# Patient Record
Sex: FEMALE | Race: WHITE | NOT HISPANIC OR LATINO | Employment: UNEMPLOYED | URBAN - METROPOLITAN AREA
[De-identification: names, ages, dates, MRNs, and addresses within clinical notes are randomized per-mention and may not be internally consistent; named-entity substitution may affect disease eponyms.]

---

## 2017-02-07 ENCOUNTER — MEDICAL CORRESPONDENCE (OUTPATIENT)
Dept: HEALTH INFORMATION MANAGEMENT | Facility: CLINIC | Age: 22
End: 2017-02-07

## 2017-09-22 ENCOUNTER — MEDICAL CORRESPONDENCE (OUTPATIENT)
Dept: HEALTH INFORMATION MANAGEMENT | Facility: CLINIC | Age: 22
End: 2017-09-22

## 2017-09-25 ENCOUNTER — TELEPHONE (OUTPATIENT)
Dept: CARDIOLOGY | Facility: CLINIC | Age: 22
End: 2017-09-25

## 2017-09-25 NOTE — TELEPHONE ENCOUNTER
Ngozi from [email or web address] calling for Han Hester.  She needs to clarify her script for Revatio so she can send to patient.  Please call to discuss.  [phone].

## 2017-12-19 ENCOUNTER — PRE VISIT (OUTPATIENT)
Dept: CARDIOLOGY | Facility: CLINIC | Age: 22
End: 2017-12-19

## 2017-12-19 ENCOUNTER — OFFICE VISIT (OUTPATIENT)
Dept: CARDIOLOGY | Facility: CLINIC | Age: 22
End: 2017-12-19
Attending: INTERNAL MEDICINE
Payer: COMMERCIAL

## 2017-12-19 ENCOUNTER — MEDICAL CORRESPONDENCE (OUTPATIENT)
Dept: HEALTH INFORMATION MANAGEMENT | Facility: CLINIC | Age: 22
End: 2017-12-19

## 2017-12-19 VITALS
HEART RATE: 84 BPM | BODY MASS INDEX: 18.07 KG/M2 | OXYGEN SATURATION: 97 % | SYSTOLIC BLOOD PRESSURE: 96 MMHG | DIASTOLIC BLOOD PRESSURE: 60 MMHG | HEIGHT: 67 IN | WEIGHT: 115.1 LBS

## 2017-12-19 DIAGNOSIS — M54.9 BACK PAIN, UNSPECIFIED BACK LOCATION, UNSPECIFIED BACK PAIN LATERALITY, UNSPECIFIED CHRONICITY: Primary | ICD-10-CM

## 2017-12-19 PROCEDURE — 99213 OFFICE O/P EST LOW 20 MIN: CPT | Mod: ZF

## 2017-12-19 PROCEDURE — 99215 OFFICE O/P EST HI 40 MIN: CPT | Mod: ZP | Performed by: INTERNAL MEDICINE

## 2017-12-19 RX ORDER — CYCLOBENZAPRINE HCL 10 MG
10 TABLET ORAL 3 TIMES DAILY PRN
Qty: 42 TABLET | Refills: 3 | Status: SHIPPED | OUTPATIENT
Start: 2017-12-19 | End: 2020-11-05

## 2017-12-19 ASSESSMENT — PAIN SCALES - GENERAL: PAINLEVEL: NO PAIN (0)

## 2017-12-19 NOTE — MR AVS SNAPSHOT
After Visit Summary   12/19/2017    Anjelica Holloway    MRN: [id number]           Patient Information     Date Of Birth          1995        Visit Information        Provider Department      12/19/2017 6:00 PM Lizz Gutierrez MD [facility]        Today's Diagnoses     Back pain, unspecified back location, unspecified back pain laterality, unspecified chronicity    -  1      Care Instructions    You were seen today in the Cardiovascular Clinic at the [facility].      Cardiology Providers you saw during your visit:  Dr. Gutierrez    Diagnosis:  Digital ischemia    Results:  None today    Recommendations:  Continue medications as prescribed.     Obtain names for OB/Gyn    Follow-up:  6 months with Dr. Gutierrez.      For emergencies call 911.    For any scheduling needs, please call [phone]. Option 1 then option 3    Thank you for your visit today!     Please call if you have any questions or concerns.  Han Hester RN              Follow-ups after your visit        Your next 10 appointments already scheduled     Jun 19, 2018 12:30 PM CDT   (Arrive by 12:15 PM)   Return Vascular Visit with Lizz Gutierrez MD   [facility] ([facility])    [address] [place], [place] [id number] [phone]              Who to contact     If you have questions or need follow up information about today's clinic visit or your schedule please contact [facility] directly at [phone].  Normal or non-critical lab and imaging results will be communicated to you by MyChart, letter or phone within 4 business days after the clinic has received the results. If you do not hear from us within 7 days, please contact the clinic through MyChart or phone. If you have a critical or abnormal lab result, we will notify you by phone as soon as possible.  Submit refill requests through [employer] or call your pharmacy and they will forward the refill  "request to us. Please allow 3 business days for your refill to be completed.          Additional Information About Your Visit        [employer] Information     [employer] lets you send messages to your doctor, view your test results, renew your prescriptions, schedule appointments and more. To sign up, go to [email or web address] . Click on \"Log in\" on the left side of the screen, which will take you to the Welcome page. Then click on \"Sign up Now\" on the right side of the page.     You will be asked to enter the access code listed below, as well as some personal information. Please follow the directions to create your username and password.     Your access code is: [email or web address]  Expires: 3/5/2018  6:30 AM     Your access code will  in 90 days. If you need help or a new code, please call your [place] clinic or [phone].        Care EveryWhere ID     This is your Care EveryWhere ID. This could be used by other organizations to access your [place] medical records  [id number]        Your Vitals Were     Pulse Height Pulse Oximetry BMI (Body Mass Index)          84 1.702 m (5' 7\") 97% 18.03 kg/m2         Blood Pressure from Last 3 Encounters:   17 96/60   16 108/74   05/23/15 122/59    Weight from Last 3 Encounters:   17 52.2 kg (115 lb 1.6 oz)   16 51.3 kg (113 lb)   05/20/15 64.9 kg (143 lb)              Today, you had the following     No orders found for display         Today's Medication Changes          These changes are accurate as of: 17  6:54 PM.  If you have any questions, ask your nurse or doctor.               Start taking these medicines.        Dose/Directions    cyclobenzaprine 10 MG tablet   Commonly known as:  FLEXERIL   Used for:  Back pain, unspecified back location, unspecified back pain laterality, unspecified chronicity   Started by:  Lizz Gutierrez MD        Dose:  10 mg   Take 1 tablet (10 mg) by mouth 3 times daily as needed for muscle spasms "   Quantity:  42 tablet   Refills:  3            Where to get your medicines      These medications were sent to [id number] - [place], [place] - [address]  [address], [facility] [address]     Phone:  [phone]     cyclobenzaprine 10 MG tablet                Primary Care Provider Office Phone # Fax #    Zackery Bowen -[phone]       [facility] [address] [facility] [address]        Equal Access to Services     JULIA ROGERS : Hadii aad ku hadasho Soomaali, waaxda luqadaha, qaybta kaalmada adeegyada, waxay idiin hayaan adejustin andres . So [facility] [phone].    ATENCIÓN: Si habla español, tiene a echavarria disposición servicios gratuitos de asistencia lingüística. [facility] [phone].    We comply with applicable federal civil rights laws and [place] laws. We do not discriminate on the basis of race, color, national origin, age, disability, sex, sexual orientation, or gender identity.            Thank you!     Thank you for choosing [facility]  for your care. Our goal is always to provide you with excellent care. Hearing back from our patients is one way we can continue to improve our services. Please take a few minutes to complete the written survey that you may receive in the mail after your visit with us. Thank you!             Your Updated Medication List - Protect others around you: Learn how to safely use, store and throw away your medicines at www.disposemymeds.org.          This list is accurate as of: 12/19/17  6:54 PM.  Always use your most recent med list.                   Brand Name Dispense Instructions for use Diagnosis    acetaminophen 500 MG Caps      Take 1 capsule by mouth as needed.        calcium-vitamin D-vitamin K 500-500-40 MG-UNT-MCG Chew    VIACTIV     Take 1 tablet by mouth daily.        cyclobenzaprine 10 MG tablet    FLEXERIL    42 tablet    Take 1 tablet (10 mg) by mouth 3 times daily as needed for muscle spasms     Back pain, unspecified back location, unspecified back pain laterality, unspecified chronicity       gabapentin 300 MG capsule    NEURONTIN     Take 600 mg by mouth 3 times daily 1 1/2 po in P.M        oxyCODONE-acetaminophen 5-325 MG per tablet    PERCOCET    40 tablet    Take 1-2 tablets by mouth every 4 hours as needed for moderate to severe pain    S/P  section       PAXIL 10 MG tablet   Generic drug:  PARoxetine      Take 10 mg by mouth every morning        RITALIN LA 10 MG Cp24   Generic drug:  methylphenidate      Take 30 mg by mouth 2 tabs of 10mg in A.M and 1  10mg in P.M        sertraline 50 MG tablet    ZOLOFT     Take 50 mg by mouth        sildenafil 20 MG tablet    REVATIO    90 tablet    Take 1 tablet by mouth 3 times daily.    Vascular abnormality, PAD (peripheral artery disease) (H)       VITAMIN D3 PO      Take 1,000 Units by mouth daily.

## 2017-12-19 NOTE — TELEPHONE ENCOUNTER
Did she have imaging done at [place]?    Dr. Billingsley : 12/7/16  Anjelica is doing superbly, with pink feet, ongoing use of sildenafil, and mild to moderate foot pain, but this has not impaired her daily activities.  I see no reason why she cannot proceed with her current employment, enroll in school, and participate in any activities that she deems helpful to maintain her health and sense of well-being.       In order to diminish her foot pain, we certainly could consider increasing her sildenafil dose sequentially on an every other week basis to either doubling or tripling the maximal daily dose.  This is safe, but any adverse effects, including headache or nausea, can simply serve as an index that the highest tolerated dose has been achieved.  As well, I see no reason why Anjelica cannot continue the use of either a nonsteroidal or narcotic medication, as you deem best.  I am aware that nonsteroidal drugs are relatively contraindicated in the context of her gastric discomfort.       I have suggested that Anjelica consider undergoing MR imaging of the aorta and first order branches between now and next year, and she will undergo ankle-brachial index and toe perfusion measurements at her earliest convenience.       When she begins school and begins utilizing increasing doses of her Ritalin, oral sildenafil medication doses may again need adjustment.  In any case, I look forward to serving Anjelica's needs in the year ahead on an annual basis.  As always, Zackery, if any questions arise, please do not hesitate to call my office.

## 2017-12-19 NOTE — LETTER
12/19/2017      RE: Anjelica Holloway  [address] [facility] [address]       Dear Colleague,    Thank you for the opportunity to participate in the care of your patient, Anjelica Holloway, at the [facility]. Please see a copy of my visit note below.    HPI:     This is a pleasant 22-year-old female who was a patient of the late Dr. 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  In the past she has had scanning of her head chest abdomen pelvis and lower extremities to evaluate for further involvement of vascular EDS.    She reports that this fall she had an issue with medication refills and this went several months without any sildenafil.  She is on sildenafil 20 mg 3 times daily.  As a result her left foot got very swollen November significant redness and also some mild skin breakdown without development of ulceration of her fourth left toe.  Once she restarted her sildenafil her symptoms improved and the skin breakdown has been healing.  She is very careful with protecting her feet from the cold.    Patient reports to me today that she is 6-7 weeks pregnant and has yet to see an OB/GYN.  She is inquiring about high risk MFM either at the [facility] or [facility] but prefers [facility] only for ease of travel she lives in [place].  She is also asking about alternative pain medicine occasion therapies while she is pregnant.  She cannot take narcotics and wanted to decrease the gabapentin she is taking.  She reports Flexeril has worked for her in the past.    Review of systems is negative for chest pain palpi tations nausea vomiting  diaphoresis, abdominal pain, diarrhea, orthopnea, lower extremity swelling, PND.    She is here with her mother who also has EDS, likely type III.  Her daughter here is also present who is now 2 years old and has significant issues with constipation.  She would like to wait until her daughter is much older before screening for EDS.    PAST MEDICAL HISTORY  Past Medical History:   Diagnosis Date     ADHD (attention deficit hyperactivity disorder)      Ehler's-Danlos syndrome 2/1/2013     Problem list name updated by automated process. Provider to review and confirm     Headaches      Joint hyperextensibility of multiple sites     suspect Ehler-Danlos but genetics testing not conclusive      Meckel's diverticulum     rupture of Meckel's diverticulum s/p bowel resection, approx 4 inches removed     PAD (peripheral artery disease) (H) 1/25/2012    Non-atherosclerotic PAD due to unknown arterial occlusive disease, presumed Bill-Danlos syndrome, with superimposed vasospasm      Peptic ulcer disease     secondary to prolonged NSAIDs use for headache     POTS (postural orthostatic tachycardia syndrome)      Vascular abnormality 2/1/2013     Vascular anomalies, 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, and hypoplastic right posterior tibular arteries       CURRENT MEDICATIONS  Current Outpatient Prescriptions   Medication Sig Dispense Refill     sertraline (ZOLOFT) 50 MG tablet Take 50 mg by mouth       sildenafil (REVATIO/VIAGRA) 20 MG tablet Take 1 tablet by mouth 3 times daily. 90 tablet 3     calcium-vitamin D-vitamin K (VIACTIV) 500-500-40 MG-UNT-MCG CHEW Take 1 tablet by mouth daily.       Cholecalciferol (VITAMIN D3 PO) Take 1,000 Units by mouth daily.       acetaminophen 500 MG CAPS Take 1 capsule by mouth as needed.       methylphenidate (RITALIN LA) 10 MG CP24 Take 30 mg by mouth 2 tabs of 10mg in A.M and 1  10mg in P.M       gabapentin (NEURONTIN) 300 MG  capsule Take 600 mg by mouth 3 times daily 1 1/2 po in P.M       PARoxetine (PAXIL) 10 MG tablet Take 10 mg by mouth every morning       oxyCODONE-acetaminophen (PERCOCET) 5-325 MG per tablet Take 1-2 tablets by mouth every 4 hours as needed for moderate to severe pain (Patient not taking: Reported on 2017) 40 tablet 0       PAST SURGICAL HISTORY:  Past Surgical History:   Procedure Laterality Date     ------------OTHER-------------  rupture Meckel diverticulum with removal of necroic bowel    Removal of      APPENDECTOMY        SECTION N/A 2015    Procedure:  SECTION;  Surgeon: Zulma Chavez MD;  Location: [facility] L[facility]     CHOLECYSTECTOMY         ALLERGIES     Allergies   Allergen Reactions     Nsaids GI Disturbance       FAMILY HISTORY  Mother with EDS   2 year old daughter - significant constipation         SOCIAL HISTORY  Social History     Social History     Marital status: Single     Spouse name: N/A     Number of children: N/A     Years of education: N/A     Occupational History     Not on file.     Social History Main Topics     Smoking status: Current Some Day Smoker     Packs/day: 0.50     Types: Cigarettes     Smokeless tobacco: Never Used     Alcohol use Yes      Comment: once/month     Drug use: No     Sexual activity: Not on file     Other Topics Concern     Not on file     Social History Narrative       ROS:   Constitutional: No fever, chills, or sweats. No weight gain/loss   ENT: No visual disturbance, ear ache, epistaxis, sore throat  Allergies/Immunologic: Negative  Respiratory: No cough, hemoptysia  Cardiovascular: As per HPI  GI: No nausea, vomiting, hematemesis, melena, or hematochezia  : No urinary frequency, dysuria, or hematuria  Integument: Negative  Psychiatric: Negative  Neuro: Negative  Endocrinology: Negative   Musculoskeletal: Negative  Vascular: No walking impairment, claudication, ischemic rest pain or nonhealing wounds    EXAM:  BP 96/60 (BP Location:  "Left arm, Patient Position: Chair, Cuff Size: Adult Regular)  Pulse 84  Ht 1.702 m (5' 7\")  Wt 52.2 kg (115 lb 1.6 oz)  SpO2 97%  BMI 18.03 kg/m2  In general, the patient is a pleasant female in no apparent distress.    HEENT: NC/AT.  PERRLA.  EOMI.  Sclerae white, not injected.  Nares clear.  Pharynx without erythema or exudate.  Dentition intact.    Neck: No adenopathy.  No thyromegaly. Carotids +2/2 bilaterally without bruits.  No jugular venous distension.   Heart: RRR. Normal S1, S2 splits physiologically. No murmur, rub, click, or gallop. The PMI is in the 5th ICS in the midclavicular line. There is no heave.    Lungs: CTA.  No ronchi, wheezes, rales.  No dullness to percussion.   Abdomen: Soft, nontender, nondistended. No organomegaly. No AAA.  No bruits.   Vascular: Absent DP pulse and PT pulse in the left diminished DP pulse in the right.  Femoral 2+ bilaterally.  Radials 2+ bilaterally.    Labs:  LIPID RESULTS:  No results found for: CHOL, HDL, LDL, TRIG, CHOLHDLRATIO, NHDL    LIVER ENZYME RESULTS:  Lab Results   Component Value Date    AST 17 02/02/2013    ALT 13 02/02/2013       CBC RESULTS:  Lab Results   Component Value Date    WBC 12.2 (H) 05/20/2015    RBC 3.73 (L) 05/20/2015    HGB 11.0 (L) 05/21/2015    HCT 35.6 05/20/2015    MCV 95 05/20/2015    MCH 31.4 05/20/2015    MCHC 32.9 05/20/2015    RDW 12.9 05/20/2015     05/20/2015       BMP RESULTS:  Lab Results   Component Value Date     02/02/2013    POTASSIUM 4.0 02/02/2013    CHLORIDE 107 02/02/2013    CO2 23 02/02/2013    ANIONGAP 8 02/02/2013    GLC 85 02/02/2013    BUN 17 02/02/2013    CR 0.64 02/02/2013    GFRESTIMATED >90 02/02/2013    GFRESTBLACK >90 02/02/2013    ERICA 8.7 02/02/2013        A1C RESULTS:  No results found for: A1C    Procedures:    4/1/2011   COMPARISON:    No similar studies.     TECHNIQUE:    Two-dimensional  non-contrast time of flight angiography  of the neck and head was performed. Following this, " contrast-enhanced  three-dimensional angiography was performed of the neck and head.     FINDINGS:    The common and internal carotid arteries are patent  bilaterally, without significant stenosis. Both vertebral arteries are  patent as well, with the V3 segments bilaterally not well seen, likely  due to artifact. Intracranially, the anterior cerebral, middle  cerebral, and posterior cerebral arteries bilaterally are patent.     IMPRESSION:    Normal examination. No stenosis, dissection, or  abnormal dilatation.    MRA of the pelvis and peripheral vessels dated 1/21/2011.     Clinical information: None available.     Technique: Arterial 2D TOF images were obtained through the pelvis and  lower extremities. Mask images were obtained at the pelvis, thighs,  knees, and legs. Post gadolinium rapid acquisitions were obtained at  each level. A second injection and multiple rapid FLASH acquisitions,  subtracted and non-subtracted were obtained separately in the lower  abdomen and pelvis. Source images were reviewed as well as 3D and  multi-planar reconstructions.      Findings:     Aorta: The heart is normal caliber without dissection bearing. Celiac,  superior mesenteric artery, and inferior mesenteric artery are widely  patent. Single renal arteries present bilaterally.     Right lower extremity:     The right common and external iliac artery are widely patent as are  the common femoral, deep femoral, femoral, and popliteal arteries. The  right anterior tibial artery is patent at least the level of the ankle  joint but is hypoplastic throughout its course. The tibioperoneal  trunk is widely patent and normal caliber. The posterior tibial artery  terminates approximately 9 cm from the bifurcation with creation of  multiple collaterals. Posterior tibial occlusion occurs 21 cm above  the ankle joint line. The peroneal trunk is prominent with patent flow  into the foot.     Left lower extremity:     The left common and  external iliac are widely patent as on the common  femoral, deep femoral, femoral common popliteal arteries. The left  anterior tibial artery is patent at least to the level of the ankle  joint but is hypoplastic throughout its course. The tibioperoneal  trunk is widely patent and normal caliber. The posterior tibial artery  terminates approximately 2.5 cm distal to the tibioperoneal  bifurcation and 25 cm proximal to the ankle mortise. The posterior  tibial artery is prominent and widely patent with flow visualized into  the foot.     Abdomen and pelvis:       A 4.3 x 4 cm hypointense nonenhancing lesion is present in the left  adnexa either representing a cyst or endometrioma. Remainder of the  abdominal and pelvic organs are unremarkable.     Impression:     1. Occlusion of both posterior tibial arteries occurring in the  midcalf on the right and the upper calf on the left.  2. Hypoplastic bilateral anterior tibial arteries with patent flow  into the feet.  3. Prominent peroneal arteries bilaterally.  4. Nonenhancing hypointense lesion in the left adnexa. While this  adnexal lesion likely represents a benign entity such as functional  cyst, it is incompletely characterized. Ultrasound could be performed.       1/21/2011  FINDINGS:     Upper extremity:   Right upper extremity: LON-0.96     Left upper extremity: LON-0.96     Essentially normal morphology waveforms in the upper extremities.     Lower extremity:  Right lower extremity:  Ankle/brachial index 1.04. Digit/brachial  index 0.59  Significant segmental pressure drop in the first digit as compared to  the ankle.     Left lower extremity: Ankle/brachial index 1.00. Digit/brachial index  0.61  Significant segmental pressure drop in the first digit as compared to  the ankle.     Asymmetric blunted waveforms in the right dorsalis pedis artery.     IMPRESSION:  1. Normal resting ankle/brachial indices.  2. Normal upper extremity wrist/brachial indices.  3.  Abnormal bilateral decreased first digit/brachial indices. May  represent delayed reconstituted flow versus peripheral small artery  disease given MRA same date demonstrated occluded posterior tibial  arteries and narrowed anterior tibial arteries with blunted dorsalis  pedis waveforms.    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  At present she has no open ulcerations.    We will continue sildenafil 20 mg 3 times daily which seems to offer her symptomatic relief and improve blood flow to her foot.  She has no vascular involvement that is commonly seen with EDS, such as aortic disease or large vessel aneurysms/dissections. However she has congenital vascular anomalies that may or may not be syndromic versus a separate disease process.    Aggressive preventive measures will continue, such as warm socks at all times and avoiding prolonged cold exposures.   She is pregnant and the increased blood flow may actually improve her symptoms and small vessel disease.   Will refer her to high risk MFM here at the  (Dr. Jatinder Stroud recommended by Denisa Parsons) and also find her a name for [facility] so she has someone near where she lives, although she likes receiving care here.       Total time spent 60 minutes, of which >50% was spent in face-to-face patient evaluation, reviewing data with patient, and coordination of care.     Lizz Gutierrez MD MSc    Division of Cardiology and Vascular Medicine   [facility]

## 2017-12-19 NOTE — NURSING NOTE
Chief Complaint   Patient presents with     Follow Up For     23 y/o female for f/u of Ehler's Danlos w/ digital ischemia.     Vitals were taken and medications were reconciled.    Marilyn Harper MA    5:58 PM

## 2017-12-19 NOTE — PROGRESS NOTES
HPI:     This is a pleasant 22-year-old female who was a patient of the late Dr. 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  In the past she has had scanning of her head chest abdomen pelvis and lower extremities to evaluate for further involvement of vascular EDS.    She reports that this fall she had an issue with medication refills and this went several months without any sildenafil.  She is on sildenafil 20 mg 3 times daily.  As a result her left foot got very swollen November significant redness and also some mild skin breakdown without development of ulceration of her fourth left toe.  Once she restarted her sildenafil her symptoms improved and the skin breakdown has been healing.  She is very careful with protecting her feet from the cold.    Patient reports to me today that she is 6-7 weeks pregnant and has yet to see an OB/GYN.  She is inquiring about high risk MFM either at the [facility] or [facility] but prefers [facility] only for ease of travel she lives in [place].  She is also asking about alternative pain medicine occasion therapies while she is pregnant.  She cannot take narcotics and wanted to decrease the gabapentin she is taking.  She reports Flexeril has worked for her in the past.    Review of systems is negative for chest pain palpi tations nausea vomiting diaphoresis, abdominal pain, diarrhea, orthopnea, lower extremity swelling, PND.    She is here with her mother who also has EDS, likely type III.  Her daughter here is also present who is now 2 years old and has significant issues with constipation.  She would like to wait until her daughter is much older before  screening for EDS.    PAST MEDICAL HISTORY  Past Medical History:   Diagnosis Date     ADHD (attention deficit hyperactivity disorder)      Ehler's-Danlos syndrome 2/1/2013     Problem list name updated by automated process. Provider to review and confirm     Headaches      Joint hyperextensibility of multiple sites     suspect Ehler-Danlos but genetics testing not conclusive      Meckel's diverticulum     rupture of Meckel's diverticulum s/p bowel resection, approx 4 inches removed     PAD (peripheral artery disease) (H) 1/25/2012    Non-atherosclerotic PAD due to unknown arterial occlusive disease, presumed Bill-Danlos syndrome, with superimposed vasospasm      Peptic ulcer disease     secondary to prolonged NSAIDs use for headache     POTS (postural orthostatic tachycardia syndrome)      Vascular abnormality 2/1/2013     Vascular anomalies, 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, and hypoplastic right posterior tibular arteries       CURRENT MEDICATIONS  Current Outpatient Prescriptions   Medication Sig Dispense Refill     sertraline (ZOLOFT) 50 MG tablet Take 50 mg by mouth       sildenafil (REVATIO/VIAGRA) 20 MG tablet Take 1 tablet by mouth 3 times daily. 90 tablet 3     calcium-vitamin D-vitamin K (VIACTIV) 500-500-40 MG-UNT-MCG CHEW Take 1 tablet by mouth daily.       Cholecalciferol (VITAMIN D3 PO) Take 1,000 Units by mouth daily.       acetaminophen 500 MG CAPS Take 1 capsule by mouth as needed.       methylphenidate (RITALIN LA) 10 MG CP24 Take 30 mg by mouth 2 tabs of 10mg in A.M and 1  10mg in P.M       gabapentin (NEURONTIN) 300 MG capsule Take 600 mg by mouth 3 times daily 1 1/2 po in P.M       PARoxetine (PAXIL) 10 MG tablet Take 10 mg by mouth every morning       oxyCODONE-acetaminophen (PERCOCET) 5-325 MG per tablet Take 1-2 tablets by mouth every 4 hours as needed for moderate to severe pain (Patient not taking: Reported on 12/19/2017) 40  "tablet 0       PAST SURGICAL HISTORY:  Past Surgical History:   Procedure Laterality Date     ------------OTHER-------------  rupture Meckel diverticulum with removal of necroic bowel    Removal of      APPENDECTOMY        SECTION N/A 2015    Procedure:  SECTION;  Surgeon: Zulma Chavez MD;  Location: [facility]     CHOLECYSTECTOMY         ALLERGIES     Allergies   Allergen Reactions     Nsaids GI Disturbance       FAMILY HISTORY  Mother with EDS   2 year old daughter - significant constipation         SOCIAL HISTORY  Social History     Social History     Marital status: Single     Spouse name: N/A     Number of children: N/A     Years of education: N/A     Occupational History     Not on file.     Social History Main Topics     Smoking status: Current Some Day Smoker     Packs/day: 0.50     Types: Cigarettes     Smokeless tobacco: Never Used     Alcohol use Yes      Comment: once/month     Drug use: No     Sexual activity: Not on file     Other Topics Concern     Not on file     Social History Narrative       ROS:   Constitutional: No fever, chills, or sweats. No weight gain/loss   ENT: No visual disturbance, ear ache, epistaxis, sore throat  Allergies/Immunologic: Negative  Respiratory: No cough, hemoptysia  Cardiovascular: As per HPI  GI: No nausea, vomiting, hematemesis, melena, or hematochezia  : No urinary frequency, dysuria, or hematuria  Integument: Negative  Psychiatric: Negative  Neuro: Negative  Endocrinology: Negative   Musculoskeletal: Negative  Vascular: No walking impairment, claudication, ischemic rest pain or nonhealing wounds    EXAM:  BP 96/60 (BP Location: Left arm, Patient Position: Chair, Cuff Size: Adult Regular)  Pulse 84  Ht 1.702 m (5' 7\")  Wt 52.2 kg (115 lb 1.6 oz)  SpO2 97%  BMI 18.03 kg/m2  In general, the patient is a pleasant female in no apparent distress.    HEENT: NC/AT.  PERRLA.  EOMI.  Sclerae white, not injected.  Nares clear.  Pharynx without " erythema or exudate.  Dentition intact.    Neck: No adenopathy.  No thyromegaly. Carotids +2/2 bilaterally without bruits.  No jugular venous distension.   Heart: RRR. Normal S1, S2 splits physiologically. No murmur, rub, click, or gallop. The PMI is in the 5th ICS in the midclavicular line. There is no heave.    Lungs: CTA.  No ronchi, wheezes, rales.  No dullness to percussion.   Abdomen: Soft, nontender, nondistended. No organomegaly. No AAA.  No bruits.   Vascular: Absent DP pulse and PT pulse in the left diminished DP pulse in the right.  Femoral 2+ bilaterally.  Radials 2+ bilaterally.    Labs:  LIPID RESULTS:  No results found for: CHOL, HDL, LDL, TRIG, CHOLHDLRATIO, NHDL    LIVER ENZYME RESULTS:  Lab Results   Component Value Date    AST 17 02/02/2013    ALT 13 02/02/2013       CBC RESULTS:  Lab Results   Component Value Date    WBC 12.2 (H) 05/20/2015    RBC 3.73 (L) 05/20/2015    HGB 11.0 (L) 05/21/2015    HCT 35.6 05/20/2015    MCV 95 05/20/2015    MCH 31.4 05/20/2015    MCHC 32.9 05/20/2015    RDW 12.9 05/20/2015     05/20/2015       BMP RESULTS:  Lab Results   Component Value Date     02/02/2013    POTASSIUM 4.0 02/02/2013    CHLORIDE 107 02/02/2013    CO2 23 02/02/2013    ANIONGAP 8 02/02/2013    GLC 85 02/02/2013    BUN 17 02/02/2013    CR 0.64 02/02/2013    GFRESTIMATED >90 02/02/2013    GFRESTBLACK >90 02/02/2013    ERICA 8.7 02/02/2013        A1C RESULTS:  No results found for: A1C    Procedures:    4/1/2011   COMPARISON:    No similar studies.     TECHNIQUE:    Two-dimensional  non-contrast time of flight angiography  of the neck and head was performed. Following this, contrast-enhanced  three-dimensional angiography was performed of the neck and head.     FINDINGS:    The common and internal carotid arteries are patent  bilaterally, without significant stenosis. Both vertebral arteries are  patent as well, with the V3 segments bilaterally not well seen, likely  due to artifact.  Intracranially, the anterior cerebral, middle  cerebral, and posterior cerebral arteries bilaterally are patent.     IMPRESSION:    Normal examination. No stenosis, dissection, or  abnormal dilatation.    MRA of the pelvis and peripheral vessels dated 1/21/2011.     Clinical information: None available.     Technique: Arterial 2D TOF images were obtained through the pelvis and  lower extremities. Mask images were obtained at the pelvis, thighs,  knees, and legs. Post gadolinium rapid acquisitions were obtained at  each level. A second injection and multiple rapid FLASH acquisitions,  subtracted and non-subtracted were obtained separately in the lower  abdomen and pelvis. Source images were reviewed as well as 3D and  multi-planar reconstructions.      Findings:     Aorta: The heart is normal caliber without dissection bearing. Celiac,  superior mesenteric artery, and inferior mesenteric artery are widely  patent. Single renal arteries present bilaterally.     Right lower extremity:     The right common and external iliac artery are widely patent as are  the common femoral, deep femoral, femoral, and popliteal arteries. The  right anterior tibial artery is patent at least the level of the ankle  joint but is hypoplastic throughout its course. The tibioperoneal  trunk is widely patent and normal caliber. The posterior tibial artery  terminates approximately 9 cm from the bifurcation with creation of  multiple collaterals. Posterior tibial occlusion occurs 21 cm above  the ankle joint line. The peroneal trunk is prominent with patent flow  into the foot.     Left lower extremity:     The left common and external iliac are widely patent as on the common  femoral, deep femoral, femoral common popliteal arteries. The left  anterior tibial artery is patent at least to the level of the ankle  joint but is hypoplastic throughout its course. The tibioperoneal  trunk is widely patent and normal caliber. The posterior tibial  artery  terminates approximately 2.5 cm distal to the tibioperoneal  bifurcation and 25 cm proximal to the ankle mortise. The posterior  tibial artery is prominent and widely patent with flow visualized into  the foot.     Abdomen and pelvis:       A 4.3 x 4 cm hypointense nonenhancing lesion is present in the left  adnexa either representing a cyst or endometrioma. Remainder of the  abdominal and pelvic organs are unremarkable.     Impression:     1. Occlusion of both posterior tibial arteries occurring in the  midcalf on the right and the upper calf on the left.  2. Hypoplastic bilateral anterior tibial arteries with patent flow  into the feet.  3. Prominent peroneal arteries bilaterally.  4. Nonenhancing hypointense lesion in the left adnexa. While this  adnexal lesion likely represents a benign entity such as functional  cyst, it is incompletely characterized. Ultrasound could be performed.       1/21/2011  FINDINGS:     Upper extremity:   Right upper extremity: LON-0.96     Left upper extremity: LON-0.96     Essentially normal morphology waveforms in the upper extremities.     Lower extremity:  Right lower extremity:  Ankle/brachial index 1.04. Digit/brachial  index 0.59  Significant segmental pressure drop in the first digit as compared to  the ankle.     Left lower extremity: Ankle/brachial index 1.00. Digit/brachial index  0.61  Significant segmental pressure drop in the first digit as compared to  the ankle.     Asymmetric blunted waveforms in the right dorsalis pedis artery.     IMPRESSION:  1. Normal resting ankle/brachial indices.  2. Normal upper extremity wrist/brachial indices.  3. Abnormal bilateral decreased first digit/brachial indices. May  represent delayed reconstituted flow versus peripheral small artery  disease given MRA same date demonstrated occluded posterior tibial  arteries and narrowed anterior tibial arteries with blunted dorsalis  pedis waveforms.      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  At present she has no open ulcerations.    We will continue sildenafil 20 mg 3 times daily which seems to offer her symptomatic relief and improve blood flow to her foot.  She has no vascular involvement that is commonly seen with EDS, such as aortic disease or large vessel aneurysms/dissections. However she has congenital vascular anomalies that may or may not be syndromic versus a separate disease process.    Aggressive preventive measures will continue, such as warm socks at all times and avoiding prolonged cold exposures.   She is pregnant and the increased blood flow may actually improve her symptoms and small vessel disease.   Will refer her to high risk MFM here at the  (Dr. Jatinder Stroud recommended by Denisa Parsons) and also find her a name for [facility] so she has someone near where she lives, although she likes receiving care here.       Total time spent 60 minutes, of which >50% was spent in face-to-face patient evaluation, reviewing data with patient, and coordination of care.     Lizz Gutierrez MD MSc    Division of Cardiology and Vascular Medicine   [facility]

## 2017-12-20 NOTE — PATIENT INSTRUCTIONS
You were seen today in the Cardiovascular Clinic at the [facility].      Cardiology Providers you saw during your visit:  Dr. Gutierrez    Diagnosis:  Digital ischemia    Results:  None today    Recommendations:  Continue medications as prescribed.     Obtain names for OB/Gyn    Follow-up:  6 months with Dr. Gutierrez.      For emergencies call 911.    For any scheduling needs, please call [phone]. Option 1 then option 3    Thank you for your visit today!     Please call if you have any questions or concerns.  Han Hester RN

## 2017-12-20 NOTE — NURSING NOTE
Med Reconcile: Reviewed and verified all current medications with the patient. The updated medication list was printed and given to the patient.  Return Appointment: 6 months with Dr. Gutierrez.  Patient given instructions regarding scheduling next clinic visit. Patient demonstrated understanding of this information and agreed to call with further questions or concerns.  Patient stated she understood all health information given and agreed to call with further questions or concerns.

## 2018-01-09 ENCOUNTER — TELEPHONE (OUTPATIENT)
Dept: CARDIOLOGY | Facility: CLINIC | Age: 23
End: 2018-01-09

## 2018-01-09 NOTE — TELEPHONE ENCOUNTER
Patient returning a call from Dr. Gutierrez's office.  Patient reports Dr. Gutierrez was going to provide some names of maternal fetal physicians near [facility] where she lives.    Please call to discuss.  [phone].

## 2018-01-30 ENCOUNTER — CARE COORDINATION (OUTPATIENT)
Dept: CARDIOLOGY | Facility: CLINIC | Age: 23
End: 2018-01-30

## 2018-01-30 DIAGNOSIS — Q79.60 EDS (EHLERS-DANLOS SYNDROME): Primary | ICD-10-CM

## 2018-01-31 DIAGNOSIS — Q79.60 EHLERS-DANLOS SYNDROME: Primary | ICD-10-CM

## 2018-01-31 NOTE — PROGRESS NOTES
Denisa christensen recommended Jatinder Burton. Anjelica called and they need an internal referral from us. She should be under the care of high-risk OB. She no longer wants to go to [facility] and wants to stay with [facility] for her OB care.     Order placed

## 2018-02-07 ENCOUNTER — CARE COORDINATION (OUTPATIENT)
Dept: CARDIOLOGY | Facility: CLINIC | Age: 23
End: 2018-02-07

## 2018-02-07 ENCOUNTER — PRE VISIT (OUTPATIENT)
Dept: MATERNAL FETAL MEDICINE | Facility: CLINIC | Age: 23
End: 2018-02-07

## 2018-02-07 NOTE — PROGRESS NOTES
Left voicemail for Anjelica.  I wanted to f/u with here on her referral and appointment with Dr. Jatinder Burton.  It appears that Anjelica has made the decision to have her OB/Gyn care here with in the [facility] system.  Left call back number.  Will f/u with her if she has any further questions.

## 2018-02-08 ENCOUNTER — HOSPITAL ENCOUNTER (OUTPATIENT)
Dept: ULTRASOUND IMAGING | Facility: CLINIC | Age: 23
Discharge: HOME | End: 2018-02-08
Attending: OBSTETRICS & GYNECOLOGY | Admitting: OBSTETRICS & GYNECOLOGY
Payer: COMMERCIAL

## 2018-02-08 ENCOUNTER — OFFICE VISIT (OUTPATIENT)
Dept: MATERNAL FETAL MEDICINE | Facility: CLINIC | Age: 23
End: 2018-02-08
Attending: OBSTETRICS & GYNECOLOGY
Payer: COMMERCIAL

## 2018-02-08 VITALS
BODY MASS INDEX: 19.08 KG/M2 | SYSTOLIC BLOOD PRESSURE: 110 MMHG | DIASTOLIC BLOOD PRESSURE: 54 MMHG | WEIGHT: 121.8 LBS | OXYGEN SATURATION: 97 % | TEMPERATURE: 100.2 F

## 2018-02-08 DIAGNOSIS — I99.9 VASCULAR ABNORMALITY: ICD-10-CM

## 2018-02-08 DIAGNOSIS — Q79.60 EHLERS-DANLOS SYNDROME: ICD-10-CM

## 2018-02-08 DIAGNOSIS — Q79.60 EHLERS-DANLOS SYNDROME: Primary | ICD-10-CM

## 2018-02-08 DIAGNOSIS — J10.1 INFLUENZA A: ICD-10-CM

## 2018-02-08 LAB
FLUAV+FLUBV AG SPEC QL: NEGATIVE
FLUAV+FLUBV AG SPEC QL: POSITIVE
SPECIMEN SOURCE: ABNORMAL

## 2018-02-08 PROCEDURE — 87804 INFLUENZA ASSAY W/OPTIC: CPT | Mod: 91 | Performed by: OBSTETRICS & GYNECOLOGY

## 2018-02-08 PROCEDURE — 76805 OB US >/= 14 WKS SNGL FETUS: CPT

## 2018-02-08 PROCEDURE — G0463 HOSPITAL OUTPT CLINIC VISIT: HCPCS | Mod: 25,ZF

## 2018-02-08 RX ORDER — PRENATAL VIT/IRON FUM/FOLIC AC 27MG-0.8MG
1 TABLET ORAL DAILY
COMMUNITY
End: 2020-11-05

## 2018-02-08 RX ORDER — OSELTAMIVIR PHOSPHATE 75 MG/1
75 CAPSULE ORAL 2 TIMES DAILY
Qty: 10 CAPSULE | Refills: 0 | Status: ON HOLD | OUTPATIENT
Start: 2018-02-08 | End: 2018-07-24

## 2018-02-08 NOTE — PROGRESS NOTES
E&M15  Pt presents to [facility] for assessment and evaluation of her pregnancy due to maternal EDS. Pt states she is doing well. Pt states not feeling any fm yet. No contractions, lof or bleeding. Us done today and reviewed by Dr. Urbano. See epic for today's findings. Obv done. See flow sheet. Pt seen today by Dr. Sheriff and Dr. Urbano. Pt states her daughter was just treated for influenza and she has also had symptoms. Influenza swab done today. Pt aware she will be called with results. Will continue with comfort measures at this time. Will have follow up with Cardiology in June prior to baby being born for her EDS. Will have first ob labs at next visit due to being sick today. Will follow up in 4-5 weeks for L2 and obv. Knows when to come in or call with questions or concerns. Dc stable at this time. Teresa Gallardo RN

## 2018-02-08 NOTE — PROGRESS NOTES
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  At present, she has no open ulcerations.   Denies: VB, LOF, ctx    Reports: Flu-like symptoms, sneezing, coughing, temp 100.2 for the past 2-3 days, her daughter had +influenza testing     Objective:   Vitals:/54 (BP Location: Left arm, Patient Position: Sitting, Cuff Size: Adult Regular)  Temp 100.2  F (37.9  C) (Oral)  Wt 55.2 kg (121 lb 12.8 oz)  LMP 10/30/2017  SpO2 97%  BMI 19.08 kg/m2   Daptone: FH + on today's US       Past Medical History:  -History of ruptured Meckel s Diverticulum s /p ~4 inch bowel resection  -POTS (postural orthostatic tachycardia syndrome)  -ADHD (attention deficit hyperactivity disorder)     -Peptic ulcer disease secondary to prolonged NSAIDs use for HA.  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   LTC-section elective in     GYN history: Denies h/o STI, fibroid, ov cysts, or abn pap  Medications: PNV, Sildenafil 20 mg tid, Sertraline, Paroxetine, Methylphenidate, Gabapentin, Tylenol, Percocet  Allergy: NSAIDs (GI disturbances)  FH: Mother with EDS, 2 yr old daughter with constipation.  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  Per genetics note this gene is on the X-chromosome - patients with mutations in the FLNA gene can have periventricular heterotopias on MRI. Patients can also have CV findings, hyperflexibility, and dyslexia.  MRI in March was negative for periventricular heterotopias,which as per Dr. Inman, is needed as diagnostic criteria for this condition.  May be carriers of Otopalatodigital spectrum disorders (X-linked disorders), associated with hearing loss, developmental delay, and specific limb shortening such as proximally placed thumbs or hypoplastic digits.    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    Imaging:  -MRI brain 2015: Negative, no structural abnormalities are identified. No neuronal migration anomalies are seen.  -MRI chest/abdomen 2015: No evidence of aneurysm or other focal within the thoracic and abdominal aorta.  -MRA head 2015: No evident aneurysm or stenosis of the major intracranial arteries.  -US Ankle-arm index 2013:   1. Dampened waveform of the left lower extremity third digit  compatible with small vessel disease.  2. Left LON indicating mild peripheral arterial disease of the left  lower extremity. Normal right LON.    -LE MRA :  1. Occlusion of both posterior tibial arteries occurring in the  midcalf on the right and the upper calf on the left.  2. Hypoplastic bilateral anterior tibial arteries with patent flow  into the feet.  3. Prominent peroneal arteries bilaterally.  -ECHO 2015: Global and regional left ventricular function is normal with an EF of 60-65%.   Right ventricular function, chamber size, wall motion, and thickness are normal. Pulmonary artery systolic pressure cannot be assessed. The inferior vena cava is normal. The aorta root is normal. No pericardial effusion is present.        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  At present, she has no open ulcerations.     1)EDS classic vs type III with vascular malformations:  -She desires repeat CS  -Anesthesia consult @32 weeks  -PTL precautions  -Initially followed by  who passed away but then started being followed by Dr. Gutierrez.      2) Vascular spasms/peripheral vascular disease  -Followed by cardiology (Dr. Gutierrez), last seen on 17, next visit 2018  -on Sildenafil for symptomatic relief (Symptoms improved after initiation)  -She is pregnant and the increased blood flow may actually improve her symptoms and small vessel disease.  - Aggressive preventive measures will continue, such as warm socks at all times and avoiding prolonged cold exposures. She is pregnant and the increased blood flow may actually improve her symptoms and small vessel disease.     3)Prenatal care:  -Prenatal labs:   A. OB initial:neg pap 1-2 years ago per pt, will draw OB labs next visit due to her cold symptoms today.  B. GCT >24 weeks: TBD  C. 3rd trim labs :TBD  D.GBS: TBD    -Weight gain:BMI 18, counseled 25-30 lb weight gain/pregnancy goal.    -Vaccinations: Tdap: TBG, Influenza: not received    -Genetics  Didn't have 1st trim screen, offered Quad, she agreed  FLNA gene mutation:  genetic evaluation of baby for possible Otopalatodigital spectrum disorders after birth.      -Imaging:   Please review imaging tab for report    -Delivery plan:  Pt desires Repeat CS. To be done @39 weeks unless indicated earlier.    -Contraception  Not interested in tubal    Plan  RTC 4 weeks  Flu swab today, if + will send Tamiflu  OB labs next visit  Quad screen next visit  Anatomy US +cervical length next visit, if cx length normal then no need for further screening      I served as scribe for Dr. Sundeep Sheriff,  MD  Maternal-Fetal Medicine fellow  Date: February 8, 2018     Physician Attestation   I, Rosalio Urbano, saw and evaluated Anjeilca Holloway with Dr. Sheriff.      I have reviewed and discussed with Dr. Sheriff the patient history, physical exam and plan of care. I personally reviewed the vital signs, medications, lab results and imaging.    Key history or physical exam findings: Classic versus Type II EDS and lower extremity vascular malformation on sildenafil. Possible influenza. Cardiology evaluation in Dec 2017:  1. Occlusion of both posterior tibial arteries occurring in the  midcalf on the right and the upper calf on the left.  2. Hypoplastic bilateral anterior tibial arteries with patent flow  into the feet.  3. Prominent peroneal arteries bilaterally.  4. Nonenhancing hypointense lesion in the left adnexa. While this  adnexal lesion likely represents a benign entity such as functional  cyst, it is incompletely characterized. Ultrasound could be performed.    Key management decisions made: Continue prenatal surveillance, offer aneuploidy risk assessment and prenatal labs. Screen and treat for possible influenza.     Rosalio Urbano  Date of Service (when I saw the patient): 02/08/18    Time Spent on this Encounter   I, Rosalio Urbano, spent a total of 15 minutes in face to face consultation today managing the care of Anjelica Holloway.  Over 50% of my time on the unit was spent counseling the patient and /or coordinating care regarding prenatal care.. See note for details.

## 2018-02-08 NOTE — MR AVS SNAPSHOT
After Visit Summary   2/8/2018    Anjelica Holloway    MRN: [id number]           Patient Information     Date Of Birth          1995        Visit Information        Provider Department      2/8/2018 11:00 AM Rosalio Urbano MD [facility] Maternal Fetal Medicine [facility]        Today's Diagnoses     Ehler's-Danlos syndrome    -  1    Vascular abnormality           Follow-ups after your visit        Additional Services     MFM Office Visit       Estimated Date of Delivery: Aug 6, 2018  Obv and L2 in 4 weeks                  Your next 10 appointments already scheduled     Jun 19, 2018 12:30 PM CDT   (Arrive by 12:15 PM)   Return Vascular Visit with Lizz Gutierrez MD   [facility] ([facility])    [address] [place] [address]  [address] [place] [id number] [phone]              Future tests that were ordered for you today     Open Future Orders        Priority Expected Expires Ordered    [facility] US Comprehensive Single Routine  12/8/2018 2/8/2018    MF Office Visit Routine 3/8/2018 2/8/2019 2/8/2018            Who to contact     If you have questions or need follow up information about today's clinic visit or your schedule please contact [facility] MATERNAL FETAL MEDICINE [facility] directly at [phone].  Normal or non-critical lab and imaging results will be communicated to you by MyChart, letter or phone within 4 business days after the clinic has received the results. If you do not hear from us within 7 days, please contact the clinic through Soci[employer] or phone. If you have a critical or abnormal lab result, we will notify you by phone as soon as possible.  Submit refill requests through ZipMatch or call your pharmacy and they will forward the refill request to us. Please allow 3 business days for your refill to be completed.          Additional Information About Your Visit        [employer] Information     ZipMatch lets you send messages to your doctor, view your  "test results, renew your prescriptions, schedule appointments and more. To sign up, go to [email or web address] . Click on \"Log in\" on the left side of the screen, which will take you to the Welcome page. Then click on \"Sign up Now\" on the right side of the page.     You will be asked to enter the access code listed below, as well as some personal information. Please follow the directions to create your username and password.     Your access code is: [email or web address]  Expires: 3/5/2018  6:30 AM     Your access code will  in 90 days. If you need help or a new code, please call your [facility] clinic or [phone].        Care EveryWhere ID     This is your Care EveryWhere ID. This could be used by other organizations to access your [facility] medical records  [id number]        Your Vitals Were     Temperature Last Period Pulse Oximetry BMI (Body Mass Index)          100.2  F (37.9  C) (Oral) 10/30/2017 97% 19.08 kg/m2         Blood Pressure from Last 3 Encounters:   18 110/54   17 96/60   16 108/74    Weight from Last 3 Encounters:   18 55.2 kg (121 lb 12.8 oz)   17 52.2 kg (115 lb 1.6 oz)   16 51.3 kg (113 lb)              We Performed the Following     Influenza A/B antigen     MFM Office Visit        Primary Care Provider Office Phone # Fax #    Adrianareji YANETH Bowen [phone]       [facility]  [address] [facility] [address]        Equal Access to Services     JULIA ROGERS : Hadjudy Witt, jasper celis, sivan calderon. So [facility] [phone].    ATENCIÓN: Si habla español, tiene a echavarria disposición servicios gratuitos de asistencia lingüística. Llame al [phone].    We comply with applicable federal civil rights laws and [place] laws. We do not discriminate on the basis of race, color, national origin, age, disability, sex, sexual orientation, or gender identity.          "   Thank you!     Thank you for choosing [facility]  for your care. Our goal is always to provide you with excellent care. Hearing back from our patients is one way we can continue to improve our services. Please take a few minutes to complete the written survey that you may receive in the mail after your visit with us. Thank you!             Your Updated Medication List - Protect others around you: Learn how to safely use, store and throw away your medicines at www.disposemymeds.org.          This list is accurate as of 18 12:47 PM.  Always use your most recent med list.                   Brand Name Dispense Instructions for use Diagnosis    acetaminophen 500 MG Caps      Take 1 capsule by mouth as needed.        calcium-vitamin D-vitamin K 500-500-40 MG-UNT-MCG Chew    VIACTIV     Take 1 tablet by mouth daily.        cyclobenzaprine 10 MG tablet    FLEXERIL    42 tablet    Take 1 tablet (10 mg) by mouth 3 times daily as needed for muscle spasms    Back pain, unspecified back location, unspecified back pain laterality, unspecified chronicity       gabapentin 300 MG capsule    NEURONTIN     Take 600 mg by mouth 3 times daily 1 1/2 po in P.M        oxyCODONE-acetaminophen 5-325 MG per tablet    PERCOCET    40 tablet    Take 1-2 tablets by mouth every 4 hours as needed for moderate to severe pain    S/P  section       PAXIL 10 MG tablet   Generic drug:  PARoxetine      Take 10 mg by mouth every morning        prenatal multivitamin plus iron 27-0.8 MG Tabs per tablet      Take 1 tablet by mouth daily        RITALIN LA 10 MG Cp24   Generic drug:  methylphenidate      Take 30 mg by mouth 2 tabs of 10mg in A.M and 1  10mg in P.M        sertraline 50 MG tablet    ZOLOFT     Take 50 mg by mouth        sildenafil 20 MG tablet    REVATIO    90 tablet    Take 1 tablet by mouth 3 times daily.    Vascular abnormality, PAD (peripheral artery disease) (H)       VITAMIN D3 PO      Take  1,000 Units by mouth daily.

## 2018-03-08 ENCOUNTER — HOSPITAL ENCOUNTER (OUTPATIENT)
Dept: ULTRASOUND IMAGING | Facility: CLINIC | Age: 23
Discharge: HOME | End: 2018-03-08
Attending: OBSTETRICS & GYNECOLOGY | Admitting: OBSTETRICS & GYNECOLOGY
Payer: COMMERCIAL

## 2018-03-08 ENCOUNTER — OFFICE VISIT (OUTPATIENT)
Dept: MATERNAL FETAL MEDICINE | Facility: CLINIC | Age: 23
End: 2018-03-08
Attending: OBSTETRICS & GYNECOLOGY
Payer: COMMERCIAL

## 2018-03-08 VITALS
HEART RATE: 84 BPM | DIASTOLIC BLOOD PRESSURE: 59 MMHG | BODY MASS INDEX: 19.52 KG/M2 | WEIGHT: 124.6 LBS | OXYGEN SATURATION: 98 % | SYSTOLIC BLOOD PRESSURE: 104 MMHG

## 2018-03-08 DIAGNOSIS — Z36.9 ENCOUNTER FOR ANTENATAL SCREENING OF MOTHER: Primary | ICD-10-CM

## 2018-03-08 DIAGNOSIS — Z36.9 ENCOUNTER FOR ANTENATAL SCREENING OF MOTHER: ICD-10-CM

## 2018-03-08 DIAGNOSIS — Q79.60 EHLERS-DANLOS SYNDROME: ICD-10-CM

## 2018-03-08 DIAGNOSIS — F41.9 ANXIETY: Primary | ICD-10-CM

## 2018-03-08 LAB
ABO + RH BLD: NORMAL
ABO + RH BLD: NORMAL
BASOPHILS # BLD AUTO: 0 10E9/L (ref 0–0.2)
BASOPHILS NFR BLD AUTO: 0.2 %
BLD GP AB SCN SERPL QL: NORMAL
BLOOD BANK CMNT PATIENT-IMP: NORMAL
DIFFERENTIAL METHOD BLD: ABNORMAL
EOSINOPHIL # BLD AUTO: 0.1 10E9/L (ref 0–0.7)
EOSINOPHIL NFR BLD AUTO: 1.1 %
ERYTHROCYTE [DISTWIDTH] IN BLOOD BY AUTOMATED COUNT: 12.9 % (ref 10–15)
HCT VFR BLD AUTO: 34.2 % (ref 35–47)
HGB BLD-MCNC: 11.3 G/DL (ref 11.7–15.7)
IMM GRANULOCYTES # BLD: 0.1 10E9/L (ref 0–0.4)
IMM GRANULOCYTES NFR BLD: 0.5 %
LYMPHOCYTES # BLD AUTO: 2.4 10E9/L (ref 0.8–5.3)
LYMPHOCYTES NFR BLD AUTO: 23.6 %
MCH RBC QN AUTO: 31.4 PG (ref 26.5–33)
MCHC RBC AUTO-ENTMCNC: 33 G/DL (ref 31.5–36.5)
MCV RBC AUTO: 95 FL (ref 78–100)
MONOCYTES # BLD AUTO: 0.5 10E9/L (ref 0–1.3)
MONOCYTES NFR BLD AUTO: 4.9 %
NEUTROPHILS # BLD AUTO: 7.2 10E9/L (ref 1.6–8.3)
NEUTROPHILS NFR BLD AUTO: 69.7 %
NRBC # BLD AUTO: 0 10*3/UL
NRBC BLD AUTO-RTO: 0 /100
PLATELET # BLD AUTO: 302 10E9/L (ref 150–450)
RBC # BLD AUTO: 3.6 10E12/L (ref 3.8–5.2)
SPECIMEN EXP DATE BLD: NORMAL
TSH SERPL DL<=0.005 MIU/L-ACNC: 2.38 MU/L (ref 0.4–4)
WBC # BLD AUTO: 10.4 10E9/L (ref 4–11)

## 2018-03-08 PROCEDURE — 86762 RUBELLA ANTIBODY: CPT | Performed by: OBSTETRICS & GYNECOLOGY

## 2018-03-08 PROCEDURE — 86901 BLOOD TYPING SEROLOGIC RH(D): CPT | Performed by: OBSTETRICS & GYNECOLOGY

## 2018-03-08 PROCEDURE — 86900 BLOOD TYPING SEROLOGIC ABO: CPT | Performed by: OBSTETRICS & GYNECOLOGY

## 2018-03-08 PROCEDURE — 40000072 ZZH STATISTIC GENETIC COUNSELING, < 16 MIN: Mod: ZF | Performed by: GENETIC COUNSELOR, MS

## 2018-03-08 PROCEDURE — 36415 COLL VENOUS BLD VENIPUNCTURE: CPT | Performed by: OBSTETRICS & GYNECOLOGY

## 2018-03-08 PROCEDURE — 86780 TREPONEMA PALLIDUM: CPT | Performed by: OBSTETRICS & GYNECOLOGY

## 2018-03-08 PROCEDURE — G0463 HOSPITAL OUTPT CLINIC VISIT: HCPCS | Mod: 25,ZF

## 2018-03-08 PROCEDURE — 76817 TRANSVAGINAL US OBSTETRIC: CPT | Performed by: OBSTETRICS & GYNECOLOGY

## 2018-03-08 PROCEDURE — 87389 HIV-1 AG W/HIV-1&-2 AB AG IA: CPT | Performed by: OBSTETRICS & GYNECOLOGY

## 2018-03-08 PROCEDURE — 85025 COMPLETE CBC W/AUTO DIFF WBC: CPT | Performed by: OBSTETRICS & GYNECOLOGY

## 2018-03-08 PROCEDURE — 87340 HEPATITIS B SURFACE AG IA: CPT | Performed by: OBSTETRICS & GYNECOLOGY

## 2018-03-08 PROCEDURE — 84443 ASSAY THYROID STIM HORMONE: CPT | Performed by: OBSTETRICS & GYNECOLOGY

## 2018-03-08 PROCEDURE — 83655 ASSAY OF LEAD: CPT | Performed by: OBSTETRICS & GYNECOLOGY

## 2018-03-08 PROCEDURE — 86787 VARICELLA-ZOSTER ANTIBODY: CPT | Performed by: OBSTETRICS & GYNECOLOGY

## 2018-03-08 PROCEDURE — 81511 FTL CGEN ABNOR FOUR ANAL: CPT | Performed by: OBSTETRICS & GYNECOLOGY

## 2018-03-08 PROCEDURE — 86850 RBC ANTIBODY SCREEN: CPT | Performed by: OBSTETRICS & GYNECOLOGY

## 2018-03-08 PROCEDURE — 76811 OB US DETAILED SNGL FETUS: CPT

## 2018-03-08 RX ORDER — CYCLOBENZAPRINE HCL 5 MG
5 TABLET ORAL
Qty: 20 TABLET | Refills: 0 | Status: SHIPPED | OUTPATIENT
Start: 2018-03-08 | End: 2018-03-15

## 2018-03-08 RX ORDER — CITALOPRAM HYDROBROMIDE 20 MG/1
TABLET ORAL
Qty: 30 TABLET | Refills: 3 | Status: SHIPPED | OUTPATIENT
Start: 2018-03-08 | End: 2018-03-15

## 2018-03-08 NOTE — PROGRESS NOTES
[facility]  Genetic Counseling Consult    Patient:  Anjelica Holloway YOB: 1995   Date of Service:  3/08/18      Anjelica Holloway was seen at the [facility] to arrange for a quad screen to assess risks for common chromosome abnormalities and neural tube defects.       Impression/Plan:   1. Anjelica has not had serum screening in this pregnancy.   Genetic counseling met with Anjelica at the request of Dr. Ramachandran to arrange for serum screening.      2. Anjelica had a comprehensive (level II) ultrasound today, the results of which were normal.  Please see the ultrasound report for further details.    3. The patient had blood drawn for a quad screen today.  Results are expected within 3-5 days, and will be available in [employer].  We will contact her to discuss the results, and a copy will be forwarded to the office of the referring OB provider.  Anjelica will be contacted at the number she provided, [phone], and requests that detailed results be left in her voicemail if she cannot be reached.      Pregnancy History:   /Parity:    Age at Delivery: 23 year old  MADELINE: 2018, by Last Menstrual Period  Gestational Age: 18w3d    No significant complications or exposures were reported in the current pregnancy.    Anjelica s pregnancy history is significant for 1 prior pregnancy with no reported complications.    Medical History:   Anjelica jane reported medical history is not expected to impact pregnancy management or risks to fetal development.         Risk Assessment for Chromosome Conditions:   We explained that the risk for fetal chromosome abnormalities increases with maternal age. We discussed specific features of common chromosome abnormalities, including Down syndrome, trisomy 13, trisomy 18, and sex chromosome trisomies.      - At age 22 at midtrimester, the risk to have a baby with Down syndrome is 1 in 1114.    - At age 22 at midtrimester,  the risk to have a baby with any chromosome abnormality is 1 in 557.     - At age 23 at delivery, the risk to have a baby with Down syndrome is 1 in 1250.     - At age 23 at delivery, the risk to have a baby with any chromosome abnormality is 1 in 476.       Anjelica did not have maternal serum screening earlier in pregnancy.       Testing Options:   We discussed the following options:   Quad screen:     Maternal plasma AFP, hCG, estriol, and inhibin measurement between 15-24 weeks gestation    Provides risk assessment for fetal Down syndrome, trisomy 18, and neural tube defects    We reviewed the benefits and limitations of this testing.  Screening tests provide a risk assessment specific to the pregnancy for certain fetal chromosome abnormalities, but cannot definitively diagnose or exclude a fetal chromosome abnormality.  Follow-up genetic counseling and consideration of diagnostic testing is recommended with any abnormal screening result. Diagnostic tests carry inherent risks- including risk of miscarriage- that require careful consideration.  These tests can detect fetal chromosome abnormalities with greater than 99% certainty.  Results can be compromised by maternal cell contamination or mosaicism, and are limited by the resolution of cytogenetic G-banding technology.  There is no screening nor diagnostic test that can detect all forms of birth defects or mental disability.    It was a pleasure to be involved with Anjelica s care. Face-to-face time of the meeting was 10 minutes.    Mustapha Iraheta MS, [facility]  Licensed Genetic Counselor  Phone: [phone]  Pager: [phone]

## 2018-03-08 NOTE — MR AVS SNAPSHOT
After Visit Summary   3/8/2018    Anjelica Holloway    MRN: [id number]           Patient Information     Date Of Birth          1995        Visit Information        Provider Department      3/8/2018 11:45 AM Peggy Ramachandran MD [facility] Maternal Fetal Medicine [facility]        Today's Diagnoses     Anxiety    -  1    Ehler's-Danlos syndrome        Encounter for  screening of mother           Follow-ups after your visit        Additional Services     MFM Office Visit       Estimated Date of Delivery: Aug 6, 2018  F/u obv and Rl2 in 3-4 weeks                  Your next 10 appointments already scheduled     Mar 29, 2018  1:30 PM CDT   (Arrive by 1:15 PM)   MFM US COMPRE SINGLE F/U with [id number]   [facility] Maternal Fetal Medicine Ultrasound [facility])    [address]  [facility] [address] [phone]           Wear comfortable clothes and leave your valuables at home.            Mar 29, 2018  2:15 PM CDT   Ob Follow Up with UR EXAM RM 1   [facility] Maternal Fetal Medicine - [facility])    [address]  [facility] [address]   [phone]            2018 12:30 PM CDT   (Arrive by 12:15 PM)   Return Vascular Visit with Lizz Gutierrez MD   [facility] ([facility])    [address] [place] [address]  [address] [place] [id number] [phone]              Future tests that were ordered for you today     Open Future Orders        Priority Expected Expires Ordered    Routine UA with microscopic (EVERY OB visit)  Routine  3/8/2019 3/8/2018    Urine Culture Aerobic Bacterial Routine  3/29/2018 3/8/2018    Neisseria gonorrhoeae PCR Routine  3/29/2018 3/8/2018    Chlamydia trachomatis PCR Routine  3/29/2018 3/8/2018    MFM US Comprehensive Single F/U Routine 3/29/2018 3/8/2019 3/8/2018    MFM Office Visit Routine 3/29/2018 3/8/2019 3/8/2018  "           Who to contact     If you have questions or need follow up information about today's clinic visit or your schedule please contact [facility] MATERNAL FETAL MEDICINE [facility] directly at [phone].  Normal or non-critical lab and imaging results will be communicated to you by MyChart, letter or phone within 4 business days after the clinic has received the results. If you do not hear from us within 7 days, please contact the clinic through MyChart or phone. If you have a critical or abnormal lab result, we will notify you by phone as soon as possible.  Submit refill requests through [employer] or call your pharmacy and they will forward the refill request to us. Please allow 3 business days for your refill to be completed.          Additional Information About Your Visit        [employer] Information     [employer] lets you send messages to your doctor, view your test results, renew your prescriptions, schedule appointments and more. To sign up, go to [email or web address] . Click on \"Log in\" on the left side of the screen, which will take you to the Welcome page. Then click on \"Sign up Now\" on the right side of the page.     You will be asked to enter the access code listed below, as well as some personal information. Please follow the directions to create your username and password.     Your access code is: [email or web address]  Expires: 2018  3:40 PM     Your access code will  in 90 days. If you need help or a new code, please call your [place] clinic or [phone].        Care EveryWhere ID     This is your Care EveryWhere ID. This could be used by other organizations to access your [facility] medical records  [id number]        Your Vitals Were     Pulse Last Period Pulse Oximetry BMI (Body Mass Index)          84 10/30/2017 98% 19.52 kg/m2         Blood Pressure from Last 3 Encounters:   18 104/59   18 110/54   17 96/60    Weight from Last 3 Encounters:   18 124 lb 9.6 oz " (56.5 kg)   02/08/18 121 lb 12.8 oz (55.2 kg)   12/19/17 115 lb 1.6 oz (52.2 kg)              We Performed the Following     ABO/Rh type and screen (Prenatal Screen)     Anti Treponema EIA (Syphilis Screening)     CBC with platelets differential     Hepatitis B surface antigen     HIV Antigen Antibody Combo     Lead Capillary     Lead Venous Blood Confirm     Maternal Quad Screen     MFM Office Visit     Rubella Antibody IgG Quantitative     TSH with free T4 reflex     Varicella Zoster Virus Antibody IgG          Today's Medication Changes          These changes are accurate as of 3/8/18 10:47 PM.  If you have any questions, ask your nurse or doctor.               Start taking these medicines.        Dose/Directions    citalopram 20 MG tablet   Commonly known as:  celeXA   Used for:  Anxiety   Started by:  Peggy Ramachandran MD        Take 1/2 tablet (10 mg) for 1-2 weeks, then increase to 1 tablet orally daily   Quantity:  30 tablet   Refills:  3         These medicines have changed or have updated prescriptions.        Dose/Directions    * cyclobenzaprine 10 MG tablet   Commonly known as:  FLEXERIL   This may have changed:  Another medication with the same name was added. Make sure you understand how and when to take each.   Used for:  Back pain, unspecified back location, unspecified back pain laterality, unspecified chronicity   Changed by:  Peggy Ramachandran MD        Dose:  10 mg   Take 1 tablet (10 mg) by mouth 3 times daily as needed for muscle spasms   Quantity:  42 tablet   Refills:  3       * cyclobenzaprine 5 MG tablet   Commonly known as:  FLEXERIL   This may have changed:  You were already taking a medication with the same name, and this prescription was added. Make sure you understand how and when to take each.   Used for:  Bill-Danlos syndrome   Changed by:  Peggy Ramachandran MD        Dose:  5 mg   Take 1 tablet (5 mg) by mouth nightly as needed for muscle spasms   Quantity:  20 tablet    Refills:  0       * Notice:  This list has 2 medication(s) that are the same as other medications prescribed for you. Read the directions carefully, and ask your doctor or other care provider to review them with you.         Where to get your medicines      Some of these will need a paper prescription and others can be bought over the counter.  Ask your nurse if you have questions.     Bring a paper prescription for each of these medications     citalopram 20 MG tablet    cyclobenzaprine 5 MG tablet                Primary Care Provider Office Phone # Fax #    Zackery YANETH Bowen [phone]       [facility] [address] [facility] [address]        Equal Access to Services     [facility] : Hadii rey peterson hadasho Somelissa, waaxda luqadaha, qaybta kaalmada gary, sivan andres . So [facility] [phone].    ATENCIÓN: Si habla español, tiene a echavarria disposición servicios gratuitos de asistencia lingüística. Llame al [phone].    We comply with applicable federal civil rights laws and [place] laws. We do not discriminate on the basis of race, color, national origin, age, disability, sex, sexual orientation, or gender identity.            Thank you!     Thank you for choosing [facility]  for your care. Our goal is always to provide you with excellent care. Hearing back from our patients is one way we can continue to improve our services. Please take a few minutes to complete the written survey that you may receive in the mail after your visit with us. Thank you!             Your Updated Medication List - Protect others around you: Learn how to safely use, store and throw away your medicines at www.disposemymeds.org.          This list is accurate as of 3/8/18 10:47 PM.  Always use your most recent med list.                   Brand Name Dispense Instructions for use Diagnosis    acetaminophen 500 MG Caps      Take 1 capsule by mouth  as needed.        calcium-vitamin D-vitamin K 500-500-40 MG-UNT-MCG Chew    VIACTIV     Take 1 tablet by mouth daily.        citalopram 20 MG tablet    celeXA    30 tablet    Take 1/2 tablet (10 mg) for 1-2 weeks, then increase to 1 tablet orally daily    Anxiety       * cyclobenzaprine 10 MG tablet    FLEXERIL    42 tablet    Take 1 tablet (10 mg) by mouth 3 times daily as needed for muscle spasms    Back pain, unspecified back location, unspecified back pain laterality, unspecified chronicity       * cyclobenzaprine 5 MG tablet    FLEXERIL    20 tablet    Take 1 tablet (5 mg) by mouth nightly as needed for muscle spasms    Bill-Danlos syndrome       gabapentin 300 MG capsule    NEURONTIN     Take 600 mg by mouth 3 times daily 1 1/2 po in P.M        oseltamivir 75 MG capsule    TAMIFLU    10 capsule    Take 1 capsule (75 mg) by mouth 2 times daily    Influenza A       oxyCODONE-acetaminophen 5-325 MG per tablet    PERCOCET    40 tablet    Take 1-2 tablets by mouth every 4 hours as needed for moderate to severe pain    S/P  section       PAXIL 10 MG tablet   Generic drug:  PARoxetine      Take 10 mg by mouth every morning        prenatal multivitamin plus iron 27-0.8 MG Tabs per tablet      Take 1 tablet by mouth daily        RITALIN LA 10 MG Cp24   Generic drug:  methylphenidate      Take 30 mg by mouth 2 tabs of 10mg in A.M and 1  10mg in P.M        sertraline 50 MG tablet    ZOLOFT     Take 50 mg by mouth        sildenafil 20 MG tablet    REVATIO    90 tablet    Take 1 tablet by mouth 3 times daily.    Vascular abnormality, PAD (peripheral artery disease) (H)       VITAMIN D3 PO      Take 1,000 Units by mouth daily.        * Notice:  This list has 2 medication(s) that are the same as other medications prescribed for you. Read the directions carefully, and ask your doctor or other care provider to review them with you.

## 2018-03-08 NOTE — MR AVS SNAPSHOT
After Visit Summary   3/8/2018    Anjelica Holloway    MRN: [id number]           Patient Information     Date Of Birth          1995        Visit Information        Provider Department      3/8/2018 10:15 AM Mustapha Iraheta GC [facility] Maternal Fetal [facility]        Today's Diagnoses     Encounter for  screening of mother           Follow-ups after your visit        Your next 10 appointments already scheduled     Mar 29, 2018  1:30 PM CDT   (Arrive by 1:15 PM)   MFM US COMPRE SINGLE F/U with URMFMUSR2   [facility] Maternal Fetal Medicine Ultrasound - [facility])    [address]  [facility] [address] [phone]           Wear comfortable clothes and leave your valuables at home.            Mar 29, 2018  2:15 PM CDT   Ob Follow Up with UR EXAM RM 1   [facility] Maternal Fetal Medicine - [place] ([facility])    [address]  [facility] [address]   [phone]            2018 12:30 PM CDT   (Arrive by 12:15 PM)   Return Vascular Visit with Lizz Gutierrez MD   [facility] ([facility])    [address] [place] [address]  [address] [place] [id number] [phone]              Future tests that were ordered for you today     Open Future Orders        Priority Expected Expires Ordered    M Genetic Counseling Routine  3/8/2018 3/8/2018    Routine UA with microscopic (EVERY OB visit)  Routine  3/8/2019 3/8/2018    Urine Culture Aerobic Bacterial Routine  3/29/2018 3/8/2018    Neisseria gonorrhoeae PCR Routine  3/29/2018 3/8/2018    Chlamydia trachomatis PCR Routine  3/29/2018 3/8/2018    MFM US Comprehensive Single F/U Routine 3/29/2018 3/8/2019 3/8/2018    MFM Office Visit Routine 3/29/2018 3/8/2019 3/8/2018            Who to contact     If you have questions or need follow up information about today's clinic visit or your schedule  "please contact [facility] directly at [phone].  Normal or non-critical lab and imaging results will be communicated to you by MyChart, letter or phone within 4 business days after the clinic has received the results. If you do not hear from us within 7 days, please contact the clinic through MyChart or phone. If you have a critical or abnormal lab result, we will notify you by phone as soon as possible.  Submit refill requests through [employer] or call your pharmacy and they will forward the refill request to us. Please allow 3 business days for your refill to be completed.          Additional Information About Your Visit        [employer] Information     [employer] lets you send messages to your doctor, view your test results, renew your prescriptions, schedule appointments and more. To sign up, go to [email or web address] . Click on \"Log in\" on the left side of the screen, which will take you to the Welcome page. Then click on \"Sign up Now\" on the right side of the page.     You will be asked to enter the access code listed below, as well as some personal information. Please follow the directions to create your username and password.     Your access code is: [email or web address]  Expires: 2018  3:40 PM     Your access code will  in 90 days. If you need help or a new code, please call your [place] clinic or [phone].        Care EveryWhere ID     This is your Care EveryWhere ID. This could be used by other organizations to access your [place] medical records  [id number]        Your Vitals Were     Last Period                   10/30/2017            Blood Pressure from Last 3 Encounters:   18 104/59   18 110/54   17 96/60    Weight from Last 3 Encounters:   18 56.5 kg (124 lb 9.6 oz)   18 55.2 kg (121 lb 12.8 oz)   17 52.2 kg (115 lb 1.6 oz)              Today, you had the following     No orders found for display         Today's " Medication Changes          These changes are accurate as of 3/8/18  3:40 PM.  If you have any questions, ask your nurse or doctor.               Start taking these medicines.        Dose/Directions    citalopram 20 MG tablet   Commonly known as:  celeXA   Used for:  Anxiety   Started by:  Peggy Ramachandran MD        Take 1/2 tablet (10 mg) for 1-2 weeks, then increase to 1 tablet orally daily   Quantity:  30 tablet   Refills:  3         These medicines have changed or have updated prescriptions.        Dose/Directions    * cyclobenzaprine 10 MG tablet   Commonly known as:  FLEXERIL   This may have changed:  Another medication with the same name was added. Make sure you understand how and when to take each.   Used for:  Back pain, unspecified back location, unspecified back pain laterality, unspecified chronicity   Changed by:  Peggy Ramachandran MD        Dose:  10 mg   Take 1 tablet (10 mg) by mouth 3 times daily as needed for muscle spasms   Quantity:  42 tablet   Refills:  3       * cyclobenzaprine 5 MG tablet   Commonly known as:  FLEXERIL   This may have changed:  You were already taking a medication with the same name, and this prescription was added. Make sure you understand how and when to take each.   Used for:  Bill-Danlos syndrome   Changed by:  Peggy Ramachandran MD        Dose:  5 mg   Take 1 tablet (5 mg) by mouth nightly as needed for muscle spasms   Quantity:  20 tablet   Refills:  0       * Notice:  This list has 2 medication(s) that are the same as other medications prescribed for you. Read the directions carefully, and ask your doctor or other care provider to review them with you.         Where to get your medicines      Some of these will need a paper prescription and others can be bought over the counter.  Ask your nurse if you have questions.     Bring a paper prescription for each of these medications     citalopram 20 MG tablet    cyclobenzaprine 5 MG tablet                Primary  Care Provider Office Phone # Fax #    Zackery Bowen [phone]       [facility] [address] [facility] [address]        Equal Access to Services     JULIA ROGERS : Lizandro rey peterson juancarloso Somikeyali, waaxda luqadaha, qaybta kaalmada adesheyla, sivan hodges laGómezcosmo willis. So [facility] [phone].    ATENCIÓN: Si habla español, tiene a echavarria disposición servicios gratuitos de asistencia lingüística. Llame al [phone].    We comply with applicable federal civil rights laws and [place] laws. We do not discriminate on the basis of race, color, national origin, age, disability, sex, sexual orientation, or gender identity.            Thank you!     Thank you for choosing [facility]  for your care. Our goal is always to provide you with excellent care. Hearing back from our patients is one way we can continue to improve our services. Please take a few minutes to complete the written survey that you may receive in the mail after your visit with us. Thank you!             Your Updated Medication List - Protect others around you: Learn how to safely use, store and throw away your medicines at www.disposemymeds.org.          This list is accurate as of 3/8/18  3:40 PM.  Always use your most recent med list.                   Brand Name Dispense Instructions for use Diagnosis    acetaminophen 500 MG Caps      Take 1 capsule by mouth as needed.        calcium-vitamin D-vitamin K 500-500-40 MG-UNT-MCG Chew    VIACTIV     Take 1 tablet by mouth daily.        citalopram 20 MG tablet    celeXA    30 tablet    Take 1/2 tablet (10 mg) for 1-2 weeks, then increase to 1 tablet orally daily    Anxiety       * cyclobenzaprine 10 MG tablet    FLEXERIL    42 tablet    Take 1 tablet (10 mg) by mouth 3 times daily as needed for muscle spasms    Back pain, unspecified back location, unspecified back pain laterality, unspecified chronicity       * cyclobenzaprine 5 MG  tablet    FLEXERIL    20 tablet    Take 1 tablet (5 mg) by mouth nightly as needed for muscle spasms    Bill-Danlos syndrome       gabapentin 300 MG capsule    NEURONTIN     Take 600 mg by mouth 3 times daily 1 1/2 po in P.M        oseltamivir 75 MG capsule    TAMIFLU    10 capsule    Take 1 capsule (75 mg) by mouth 2 times daily    Influenza A       oxyCODONE-acetaminophen 5-325 MG per tablet    PERCOCET    40 tablet    Take 1-2 tablets by mouth every 4 hours as needed for moderate to severe pain    S/P  section       PAXIL 10 MG tablet   Generic drug:  PARoxetine      Take 10 mg by mouth every morning        prenatal multivitamin plus iron 27-0.8 MG Tabs per tablet      Take 1 tablet by mouth daily        RITALIN LA 10 MG Cp24   Generic drug:  methylphenidate      Take 30 mg by mouth 2 tabs of 10mg in A.M and 1  10mg in P.M        sertraline 50 MG tablet    ZOLOFT     Take 50 mg by mouth        sildenafil 20 MG tablet    REVATIO    90 tablet    Take 1 tablet by mouth 3 times daily.    Vascular abnormality, PAD (peripheral artery disease) (H)       VITAMIN D3 PO      Take 1,000 Units by mouth daily.        * Notice:  This list has 2 medication(s) that are the same as other medications prescribed for you. Read the directions carefully, and ask your doctor or other care provider to review them with you.

## 2018-03-08 NOTE — PROGRESS NOTES
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     She is feeling more sharp leg pain over last few weeks, wondering if she can get flexeril (which she received with last pregnancy) to take as needed. No skin breakdown or ulcerations. States with last pregnancy she took it once a day before bedtime as needed. Other concern for today is anxiety. Prior to pregnancy was on paxil but was then switched to zoloft at start of this pregnancy. Only took a few doses as it made her feel extremely nauseated and overall unwell. Last took months ago. Would like to try something else.    Recently diagnosed with influenza and prescribed Tamiflu. Had a long recovery ~2 weeks but finally recovered.     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  At present, she has no open ulcerations. Concerns for today include anxiety with poor tolerance of zoloft.     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 Billingsley who passed away but then started being followed by Dr. Gutierrez.      2) Vascular  spasms/peripheral vascular disease  -Followed by cardiology (Dr. Gutierrez), last seen on 17, next visit 2018  -On Sildenafil for symptomatic relief. Requesting flexeril - will prescribe limited amount to use as needed.  -Increased blood flow during pregnancy may actually improve her symptoms and small vessel disease.  -Aggressive preventive measures will continue, such as warm socks at all times and avoiding prolonged cold exposures.   3) Anxiety, on paxil prior to pregnancy  -Did not tolerate zoloft. Will start celexa today (10 mg/day for 1-2 weeks and then increase to 20 mg/day). Reviewed medication has been associated with risk of fetal primary pulmonary hypertension and withdrawal symptoms if taken closer to time of delivery. However, benefits outweigh risks if needed for anxiety. Will reassess need for medication at that time.  4) Prenatal care:  -NOB labs today (did not obtain last time as she was diagnosed with influenza)  -Genetic: quad screen today  -GCT >24 weeks  -Plan for 3rd trim labs  -GBS ~35 weeks  -Weight gain: BMI 18, counseled 25-30 lb weight gain/pregnancy goal.  -Tdap: TBG  -FLNA gene mutation:  genetic evaluation of baby for possible. Otopalatodigital spectrum disorders after birth.  -Delivery plan: desires repeat CS at 39 weeks unless indication for earlier delivery  -Contraception: TBD    Plan  Follow-up growth and cervical length, and clinic visit in 3 weeks  Follow-up NOB labs and Quad screen    This note, as scribed, accurately reflects the examination, my impressions and plan as discussed with the patient.    Peggy Ramachandran MD  Specialist in Maternal-Fetal Medicine    I served as scribe for Dr. Duarte Singh MD  OB/GYN Resident, PGY-2  3/8/2018

## 2018-03-08 NOTE — PROGRESS NOTES
E&M15  Pt presents to [facility] for assessment and evaluation of her pregnancy due to EDS. Pt states she is doing well. Pt states +fm, no lof or bleeding. No cramping. Us done today and reviewed by Dr. Ramachandran. See epic for today's findings. Obv done today. See flow sheet. Pt seen today by Dr. Singh and Dr. Ramachandran. Concerns addressed re anxiety and leg muscle pain. Pt would also like to do quad screen today. Pt met with GC to discuss. Sent to lab for all first ob lab work. Will return in 3 weeks for Rl2 to reassess heart and ob follow up. Knows when to come back in or call with concerns. Dc stable at this time. Teresa Gallardo RN

## 2018-03-09 LAB
HBV SURFACE AG SERPL QL IA: NONREACTIVE
HIV 1+2 AB+HIV1 P24 AG SERPL QL IA: NONREACTIVE
LEAD BLD-MCNC: NORMAL UG/DL (ref 0–4.9)
RUBV IGG SERPL IA-ACNC: 9 IU/ML
SPECIMEN SOURCE: NORMAL
T PALLIDUM IGG+IGM SER QL: NEGATIVE
VZV IGG SER QL IA: 2 AI (ref 0–0.8)

## 2018-03-10 LAB — LEAD BLDV-MCNC: <2 UG/DL (ref 0–4.9)

## 2018-03-13 ENCOUNTER — TELEPHONE (OUTPATIENT)
Dept: MATERNAL FETAL MEDICINE | Facility: CLINIC | Age: 23
End: 2018-03-13

## 2018-03-13 NOTE — TELEPHONE ENCOUNTER
Left a voicemail for Anjelica with negative quad screening results:    ONTD risk < 1:10,000  Down syndrome risk <1:21973  Trisomy 18 risk <1:30443    Encouraged her to contact me our the [facility] office with additional questions.     Kelsi Espino MS, [facility]  Certified Genetic Counselor

## 2018-03-14 LAB — LAB SCANNED RESULT: NORMAL

## 2018-03-15 DIAGNOSIS — F41.9 ANXIETY: ICD-10-CM

## 2018-03-15 DIAGNOSIS — Q79.60 EHLERS-DANLOS SYNDROME: ICD-10-CM

## 2018-03-15 RX ORDER — CITALOPRAM HYDROBROMIDE 20 MG/1
TABLET ORAL
Qty: 30 TABLET | Refills: 3 | Status: ON HOLD | OUTPATIENT
Start: 2018-03-15 | End: 2018-07-25

## 2018-03-15 RX ORDER — CYCLOBENZAPRINE HCL 5 MG
5 TABLET ORAL
Qty: 20 TABLET | Refills: 0 | Status: SHIPPED | OUTPATIENT
Start: 2018-03-15 | End: 2020-11-05

## 2018-03-20 ENCOUNTER — NURSE TRIAGE (OUTPATIENT)
Dept: NURSING | Facility: CLINIC | Age: 23
End: 2018-03-20

## 2018-03-21 ENCOUNTER — TELEPHONE (OUTPATIENT)
Dept: MATERNAL FETAL MEDICINE | Facility: CLINIC | Age: 23
End: 2018-03-21

## 2018-03-21 DIAGNOSIS — Z20.828 PARVOVIRUS EXPOSURE: ICD-10-CM

## 2018-03-21 DIAGNOSIS — Z20.828 PARVOVIRUS EXPOSURE: Primary | ICD-10-CM

## 2018-03-21 PROCEDURE — 99000 SPECIMEN HANDLING OFFICE-LAB: CPT | Performed by: FAMILY MEDICINE

## 2018-03-21 PROCEDURE — 86747 PARVOVIRUS ANTIBODY: CPT | Mod: 90 | Performed by: FAMILY MEDICINE

## 2018-03-21 PROCEDURE — 36415 COLL VENOUS BLD VENIPUNCTURE: CPT | Performed by: FAMILY MEDICINE

## 2018-03-21 NOTE — TELEPHONE ENCOUNTER
Writer called and left message with Anjelica to call back [facility] line to discuss Parvovirus lab that was placed. Anjelica may go to any [place] lab to have this drawn. Thu Henriquez RN

## 2018-03-21 NOTE — TELEPHONE ENCOUNTER
Reason for Disposition    Nursing judgment    Additional Information    Negative: Nursing judgment    Negative: Information only call; adult is not ill or injured    Negative: Nursing judgment    Negative: Nursing judgment    Negative: Nursing judgment    Negative: Nursing judgment    Negative: Nursing judgment    Protocols used: NO GUIDELINE OR REFERENCE AVAILABLE-ADULT-

## 2018-03-21 NOTE — TELEPHONE ENCOUNTER
and pt calling. 20 wks pregnant. Gets OB care at [facility] d/t Ehler's-Danlos syndrome. Pt was w/ 2 year old niece 3/18. Today (3/20)  niece woke w/ rash, saw doctor, dx Fifth dz. Niece's doctor said if she was around any pregnant women they should be notified right away as Fifth dz may affect the fetus. Pt herself has no sx. No guideline available. Pt / ask what they can/should do. Advised pt/spouse will have them talk w/OB on-call.Paged on-call via [facility] page op @7:24pm to call pt  at [phone]. Advised call back if no call in 20-30 min. Pt/spouse voiced understanding and agreement. Verona Montoya RN/FNA

## 2018-03-23 ENCOUNTER — TELEPHONE (OUTPATIENT)
Dept: OBGYN | Facility: CLINIC | Age: 23
End: 2018-03-23

## 2018-03-23 LAB
B19V IGG SER IA-ACNC: 4.09 IV
B19V IGM SER IA-ACNC: 0.88 IV

## 2018-03-23 NOTE — TELEPHONE ENCOUNTER
Called to report immune parvovirus status based on test result today. Patient was concerned regarding exposure to child with diagnosis of parvovirus.

## 2018-03-29 ENCOUNTER — OFFICE VISIT (OUTPATIENT)
Dept: MATERNAL FETAL MEDICINE | Facility: CLINIC | Age: 23
End: 2018-03-29
Attending: OBSTETRICS & GYNECOLOGY
Payer: COMMERCIAL

## 2018-03-29 ENCOUNTER — HOSPITAL ENCOUNTER (OUTPATIENT)
Dept: ULTRASOUND IMAGING | Facility: CLINIC | Age: 23
Discharge: HOME | End: 2018-03-29
Attending: OBSTETRICS & GYNECOLOGY | Admitting: OBSTETRICS & GYNECOLOGY
Payer: COMMERCIAL

## 2018-03-29 VITALS
WEIGHT: 129.2 LBS | BODY MASS INDEX: 20.24 KG/M2 | DIASTOLIC BLOOD PRESSURE: 55 MMHG | SYSTOLIC BLOOD PRESSURE: 106 MMHG | OXYGEN SATURATION: 100 % | HEART RATE: 83 BPM

## 2018-03-29 DIAGNOSIS — Q79.60 EHLERS-DANLOS SYNDROME: ICD-10-CM

## 2018-03-29 PROCEDURE — 76817 TRANSVAGINAL US OBSTETRIC: CPT | Performed by: OBSTETRICS & GYNECOLOGY

## 2018-03-29 PROCEDURE — 87591 N.GONORRHOEAE DNA AMP PROB: CPT | Performed by: OBSTETRICS & GYNECOLOGY

## 2018-03-29 PROCEDURE — 76816 OB US FOLLOW-UP PER FETUS: CPT

## 2018-03-29 PROCEDURE — 87086 URINE CULTURE/COLONY COUNT: CPT | Performed by: OBSTETRICS & GYNECOLOGY

## 2018-03-29 PROCEDURE — 87491 CHLMYD TRACH DNA AMP PROBE: CPT | Performed by: OBSTETRICS & GYNECOLOGY

## 2018-03-29 PROCEDURE — G0463 HOSPITAL OUTPT CLINIC VISIT: HCPCS | Mod: 25,ZF

## 2018-03-29 ASSESSMENT — PAIN SCALES - GENERAL: PAINLEVEL: NO PAIN (0)

## 2018-03-29 NOTE — MR AVS SNAPSHOT
After Visit Summary   3/29/2018    Anjelica Holloway    MRN: [id number]           Patient Information     Date Of Birth          1995        Visit Information        Provider Department      3/29/2018 2:15 PM Alis Tabares,  [facility] Maternal Fetal Medicine [facility]        Today's Diagnoses     Ehler's-Danlos syndrome           Follow-ups after your visit        Additional Services     MFM Office Visit  (Weekly)      F/u comp and f/u obv in 4 weeks                  Your next 10 appointments already scheduled     Apr 26, 2018  1:30 PM CDT   (Arrive by 1:15 PM)   MFM US COMPRE SINGLE F/U with URMFMUSR4   [facility] Maternal Fetal Medicine Ultrasound - [place] ([facility])    [address]  [facility] [address] [phone]           Wear comfortable clothes and leave your valuables at home.            Apr 26, 2018  2:15 PM CDT   Ob Follow Up with UR EXAM RM 1   [facility] M[facility]etal Medicine - [place] ([facility])    [address]  [facility] [address] [phone]            Jun 19, 2018 12:30 PM CDT   (Arrive by 12:15 PM)   Return Vascular Visit with Lizz Gutierrez MD   [facility] ([facility])    [address] [place] [address]  [address] [place] [address] [phone]              Who to contact     If you have questions or need follow up information about today's clinic visit or your schedule please contact [facility] directly at [phone].  Normal or non-critical lab and imaging results will be communicated to you by MyChart, letter or phone within 4 business days after the clinic has received the results. If you do not hear from us within 7 days, please contact the clinic through MyChart or phone. If you have a critical or abnormal lab result, we will notify you by phone as soon as possible.  Submit refill requests  "through [employer] or call your pharmacy and they will forward the refill request to us. Please allow 3 business days for your refill to be completed.          Additional Information About Your Visit        [employer] Information     [employer] lets you send messages to your doctor, view your test results, renew your prescriptions, schedule appointments and more. To sign up, go to [email or web address] . Click on \"Log in\" on the left side of the screen, which will take you to the Welcome page. Then click on \"Sign up Now\" on the right side of the page.     You will be asked to enter the access code listed below, as well as some personal information. Please follow the directions to create your username and password.     Your access code is: [email or web address]  Expires: 2018  4:40 PM     Your access code will  in 90 days. If you need help or a new code, please call your [place] clinic or [phone].        Care EveryWhere ID     This is your Care EveryWhere ID. This could be used by other organizations to access your [place] medical records  [id number]        Your Vitals Were     Pulse Last Period Pulse Oximetry BMI (Body Mass Index)          83 10/30/2017 100% 20.24 kg/m2         Blood Pressure from Last 3 Encounters:   18 106/55   18 104/59   18 110/54    Weight from Last 3 Encounters:   18 58.6 kg (129 lb 3.2 oz)   18 56.5 kg (124 lb 9.6 oz)   18 55.2 kg (121 lb 12.8 oz)              We Performed the Following     Chlamydia PCR     Gonorrhea PCR     MFM Office Visit     Urine Culture Aerobic Bacterial        Primary Care Provider Office Phone # Fax #    Zackery Bowen [phone]       [facility] [address] [facility] [address]        Equal Access to Services     [facility] KEN : Lizandro Witt, jasper celis, qasivan moreira . [facility] [phone].    ATENCIÓN: Si tonla " español, tiene a echavarria disposición servicios gratuitos de asistencia lingüística. Yahaira chavez [phone].    We comply with applicable federal civil rights laws and [place] laws. We do not discriminate on the basis of race, color, national origin, age, disability, sex, sexual orientation, or gender identity.            Thank you!     Thank you for choosing [facility]  for your care. Our goal is always to provide you with excellent care. Hearing back from our patients is one way we can continue to improve our services. Please take a few minutes to complete the written survey that you may receive in the mail after your visit with us. Thank you!             Your Updated Medication List - Protect others around you: Learn how to safely use, store and throw away your medicines at www.disposemymeds.org.          This list is accurate as of 3/29/18 11:59 PM.  Always use your most recent med list.                   Brand Name Dispense Instructions for use Diagnosis    acetaminophen 500 MG Caps      Take 1 capsule by mouth as needed.        calcium-vitamin D-vitamin K 500-500-40 MG-UNT-MCG Chew    VIACTIV     Take 1 tablet by mouth daily.        citalopram 20 MG tablet    celeXA    30 tablet    Take 1/2 tablet (10 mg) for 1-2 weeks, then increase to 1 tablet orally daily    Anxiety       * cyclobenzaprine 10 MG tablet    FLEXERIL    42 tablet    Take 1 tablet (10 mg) by mouth 3 times daily as needed for muscle spasms    Back pain, unspecified back location, unspecified back pain laterality, unspecified chronicity       * cyclobenzaprine 5 MG tablet    FLEXERIL    20 tablet    Take 1 tablet (5 mg) by mouth nightly as needed for muscle spasms    Bill-Danlos syndrome       gabapentin 300 MG capsule    NEURONTIN     Take 600 mg by mouth 3 times daily 1 1/2 po in P.M        oseltamivir 75 MG capsule    TAMIFLU    10 capsule    Take 1 capsule (75 mg) by mouth 2 times daily    Influenza A        oxyCODONE-acetaminophen 5-325 MG per tablet    PERCOCET    40 tablet    Take 1-2 tablets by mouth every 4 hours as needed for moderate to severe pain    S/P  section       PAXIL 10 MG tablet   Generic drug:  PARoxetine      Take 10 mg by mouth every morning        prenatal multivitamin plus iron 27-0.8 MG Tabs per tablet      Take 1 tablet by mouth daily        RITALIN LA 10 MG Cp24   Generic drug:  methylphenidate      Take 30 mg by mouth 2 tabs of 10mg in A.M and 1  10mg in P.M        sertraline 50 MG tablet    ZOLOFT     Take 50 mg by mouth        sildenafil 20 MG tablet    REVATIO    90 tablet    Take 1 tablet by mouth 3 times daily.    Vascular abnormality, PAD (peripheral artery disease) (H)       VITAMIN D3 PO      Take 1,000 Units by mouth daily.        * Notice:  This list has 2 medication(s) that are the same as other medications prescribed for you. Read the directions carefully, and ask your doctor or other care provider to review them with you.

## 2018-03-29 NOTE — PROGRESS NOTES
MFM OB follow-up clinic visit     S: Doing well, states that her leg cramps are improved with Flexeril. For her anxiety she started Celexa last time for one day and it made her very seepy and nauseous, wondering about restarting Paxil.  Denies: VB, LOF, ctx.      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  At present, she has no open ulcerations. Concerns for today include anxiety with poor tolerance of zoloft.     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 Billingsley who passed away but then started being followed by Dr. Gutierrez.  -Last pregnancy had growth US every 4 weeks, She states it will re-assure her if she would have them done again serially.  - See genetic counseling notes from previous pregnancy or consultation for further details.          2) Vascular spasms/peripheral vascular disease  -Followed by cardiology (Dr. Gutierrez), last seen on 17, next visit 2018  -On Sildenafil for symptomatic relief. Requesting flexeril - will prescribe limited amount to use as needed.  -Increased blood flow during pregnancy may actually improve her symptoms and small vessel disease.  -Aggressive preventive measures will continue, such as warm socks at all times and avoiding prolonged cold exposures.   3) Anxiety, on paxil prior to pregnancy  -Did not tolerate zoloft which she tried for a week. Was started on celexa (10 mg/day) last visit, she tried it for one  day and made her nauseous and very sleepy.  Counseled her about associated effects with use of Paxil so she didn't want to restart it anymore.  Counseled her about trying the Celexa again with 5 mg/day and see a therapist in [place], she is interested in that plan.  Reviewed that Celexa has been associated with risk of fetal primary pulmonary hypertension and withdrawal symptoms if taken closer to time of delivery. However, benefits outweigh risks if needed for anxiety. Will reassess need for medication at that time.  4) Parvovirus exposure  -Testing shows immune to Parvovirus    5) Prenatal care:  -A+/Ab neg, HIV neg, HepBAg NR, RPR neg, Rubella Equivocal,   -GC/Chlamydia and Ucx to be sent today.  -Genetic: quad screen neg screen  -GCT >24 weeks  -Plan for 3rd trim labs  -GBS ~35 weeks  -Weight gain: BMI 18, counseled 25-30 lb weight gain/pregnancy goal.  -Tdap: TBG  -FLNA gene mutation:  genetic evaluation of baby for possible. Otopalatodigital spectrum disorders after birth.  -Delivery plan: desires repeat CS at 39 weeks unless indication for earlier delivery  -Contraception: TBD     Plan  Gc/chlamydia, Ucx today  Growth US q4 weeks  RTC in 4 weeks    I served as a sribe for Dr.Jacobs Janet Sheriff MD  Maternal-Fetal Medicine fellow  2018     Attestation:   The documentation recorded by the scribe accurately reflects the services I personally performed and the decisions made by me.   Alis Tabares,   Maternal Fetal Medicine Specialist           The patient was seen for an established outpatient visit.  I spent a total of 15 minutes face to face with Anjelica Holloway during today's visit. Over 50% of this time was spent counseling the patient and/or coordinating care regarding EDS and congenital vascular abnormalities.

## 2018-03-29 NOTE — NURSING NOTE
Anjelica here today with FOB for f/u obv and f/u comp u/s due to preg c/b EDS and POTS. Pt reports +FM denies ctx, denies SRoM, and denies vag bleeding.  Pt still expresses anxiety. Pt reports that she tried Celexa, but it made her super sleepy and nauseous.  Dr. Sheriff and Dr. Tabares in to talk with pt. Pt to try Celexa again, but 1/2 tablet. Pt only tried it for one day and took full dose.  Pt made apt to come back in 4 weeks for f/u comp and f/u obv.  Pt left amb and stable.  GcT at next visit.  Thu Henriquez RN

## 2018-03-30 LAB
BACTERIA SPEC CULT: NO GROWTH
C TRACH DNA SPEC QL NAA+PROBE: NEGATIVE
Lab: NORMAL
N GONORRHOEA DNA SPEC QL NAA+PROBE: NEGATIVE
SPECIMEN SOURCE: NORMAL

## 2018-04-25 NOTE — PROGRESS NOTES
MFM OB follow-up clinic visit     S: Doing well, restarted Celexa at 5mg dose and is tolerating well, feels it is helping. Having pain in her left low back shooting down left leg especially when bending to  toys or sitting long periods in the car. Baths have helped and stretching. No weakness with ambulation.  Denies VB, LOF, ctx. Normal fetal movement.     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 She is currently doing well.     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 Initially followed by Dr. Billingsley who passed away, now follows with Dr. Gutierrez. See genetic counseling notes from previous pregnancy or consultation for further details  -Last pregnancy had growth US q4wks and found reassuring, so are doing this pregnancy as well  -Desires repeat CS  -Plan anesthesia consult @ 32 weeks  -PTL precautions    2) Vascular spasms/peripheral vascular disease  -Followed by cardiology (Dr. Gutierrez), last seen on 17, next visit 2018  -On Sildenafil for symptomatic relief and flexeril rarely PRN  -Increased blood flow during pregnancy may actually improve her symptoms and small vessel disease.  -Aggressive preventive measures will continue, such as warm socks at all times and avoiding prolonged cold exposures.   3) Anxiety, on paxil prior to pregnancy  -Did not  tolerate zoloft which she tried for a week. Was started on celexa (10 mg/day), was nauseous and very sleepy, since last visit has been taking 5mg/day and tolerating well  Previously reviewed that Celexa has been associated with risk of fetal primary pulmonary hypertension and withdrawal symptoms if taken closer to time of delivery. However, benefits outweigh risks if needed for anxiety. Will reassess need for medication close to delivery    4) Parvovirus exposure  -Testing shows immune to Parvovirus    5) Sciatica, left side: Discussed maternity support belt and PT as options. Would like to try support belt. Can do stretches at home and will let us know if wants to try PT    6) Prenatal care:  -A+/Ab neg, HIV neg, HepBAg NR, RPR neg  - Rubella Equivocal, will offer MMR postpartum  -GC/Chlamydia and Ucx neg/NG @ last visit  -Genetic: quad screen neg  - GCT ordered today, she will [facility] due to time constraints today  - CBC, treponema at next visit  -Tdap @ next visit  - GBS ~35 weeks  -Weight gain: BMI 18, +18lbs to date, 25-30lb weight gain goal, on track  -FLNA gene mutation:  genetic evaluation of baby for possible otopalatodigital spectrum disorders after birth  -Delivery plan: desires repeat CS at 39 weeks unless indication for earlier delivery  -Will address contraception and feeding plan at next visit    Plan  Rx for maternity support belt given  Growth US q3-4 weeks  RTC in 4 weeks for EOB  Will call if GCT elevated    I served as a scribe for Dr.Rauk Emely Peres MD  OB Gyn PGY2  18    This note, as scribed, accurately reflects the examination, my impressions and plan as discussed with the patient.    Jg Phillips MD  Maternal-Fetal Medicine

## 2018-04-26 ENCOUNTER — HOSPITAL ENCOUNTER (OUTPATIENT)
Dept: ULTRASOUND IMAGING | Facility: CLINIC | Age: 23
Discharge: HOME | End: 2018-04-26
Attending: OBSTETRICS & GYNECOLOGY | Admitting: OBSTETRICS & GYNECOLOGY
Payer: COMMERCIAL

## 2018-04-26 ENCOUNTER — OFFICE VISIT (OUTPATIENT)
Dept: MATERNAL FETAL MEDICINE | Facility: CLINIC | Age: 23
End: 2018-04-26
Attending: OBSTETRICS & GYNECOLOGY
Payer: COMMERCIAL

## 2018-04-26 VITALS
SYSTOLIC BLOOD PRESSURE: 102 MMHG | DIASTOLIC BLOOD PRESSURE: 57 MMHG | HEART RATE: 89 BPM | BODY MASS INDEX: 21.9 KG/M2 | WEIGHT: 139.8 LBS

## 2018-04-26 DIAGNOSIS — Z33.1 PREGNANCY, INCIDENTAL: Primary | ICD-10-CM

## 2018-04-26 DIAGNOSIS — Q79.60 EHLERS-DANLOS SYNDROME: ICD-10-CM

## 2018-04-26 PROCEDURE — G0463 HOSPITAL OUTPT CLINIC VISIT: HCPCS | Mod: 25,ZF

## 2018-04-26 PROCEDURE — 76816 OB US FOLLOW-UP PER FETUS: CPT

## 2018-04-26 NOTE — MR AVS SNAPSHOT
After Visit Summary   4/26/2018    Anjelica Holloway    MRN: [id number]           Patient Information     Date Of Birth          1995        Visit Information        Provider Department      4/26/2018 2:15 PM Jg Phillips MD [facility] Maternal Fetal Medicine [facility]        Today's Diagnoses     Pregnancy, incidental    -  1    Ehler's-Danlos syndrome           Follow-ups after your visit        Additional Services     MFM Office Visit       Estimated Date of Delivery: Aug 6, 2018  Pt needs f/u obv with Rl2 in 4 weeks in PAC                  Your next 10 appointments already scheduled     May 24, 2018  1:30 PM CDT   (Arrive by 1:15 PM)   MFM US COMPRE SINGLE F/U with [id number]   [facility] Maternal Fetal Medicine [facility])    [address]  [facility] [id number] [phone]           Wear comfortable clothes and leave your valuables at home.            May 24, 2018  2:15 PM CDT   Ob Follow Up with UR EXAM RM 1   [facility] Maternal Fetal Medicine [facility] ([facility])    [address]  [facility] [id number] [phone]            Jun 19, 2018 12:30 PM CDT   (Arrive by 12:15 PM)   Return Vascular Visit with Lizz Gutierrez MD   [facility] ([facility] [place])    [address] [place] [address]  [address] [place] [id number] [phone]              Future tests that were ordered for you today     Open Future Orders        Priority Expected Expires Ordered    MFM US Comprehensive Single F/U Routine 5/24/2018 4/26/2019 4/26/2018    MFM Office Visit Routine 5/24/2018 4/26/2019 4/26/2018    Glucose 1 Hour Routine  4/26/2019 4/26/2018            Who to contact     If you have questions or need follow up information about today's clinic visit or your schedule please contact [facility] MATERNAL FETAL MEDICINE [facility] directly at [phone].  Normal or  "non-critical lab and imaging results will be communicated to you by MyChart, letter or phone within 4 business days after the clinic has received the results. If you do not hear from us within 7 days, please contact the clinic through [facility] or phone. If you have a critical or abnormal lab result, we will notify you by phone as soon as possible.  Submit refill requests through [facility] or call your pharmacy and they will forward the refill request to us. Please allow 3 business days for your refill to be completed.          Additional Information About Your Visit        [email or web address] Information     [facility] lets you send messages to your doctor, view your test results, renew your prescriptions, schedule appointments and more. To sign up, go to [email or web address] . Click on \"Log in\" on the left side of the screen, which will take you to the Welcome page. Then click on \"Sign up Now\" on the right side of the page.     You will be asked to enter the access code listed below, as well as some personal information. Please follow the directions to create your username and password.     Your access code is: [email or web address]  Expires: 2018  4:40 PM     Your access code will  in 90 days. If you need help or a new code, please call your [place] clinic or [phone].        Care EveryWhere ID     This is your Care EveryWhere ID. This could be used by other organizations to access your [place] medical records  [id number]        Your Vitals Were     Pulse Last Period BMI (Body Mass Index)             89 10/30/2017 21.9 kg/m2          Blood Pressure from Last 3 Encounters:   18 102/57   18 106/55   18 104/59    Weight from Last 3 Encounters:   18 63.4 kg (139 lb 12.8 oz)   18 58.6 kg (129 lb 3.2 oz)   18 56.5 kg (124 lb 9.6 oz)              We Performed the Following     [facility] Office Visit        Primary Care Provider Office Phone # Fax #    Zackery Bowen [phone] " [phone]       [facility] [address] [facility] [address]        Equal Access to Services     JULIA ROGERS : Hadii aad ku hadlelo Witt, waarmandoda luqpatricio, qaybta kaalmada gary, sivan trudiin hayaainocencia gramajojustin hodges laGómezcosmo willis. So [facility] [phone].    ATENCIÓN: Si habla español, tiene a echavarria disposición servicios gratuitos de asistencia lingüística. Yahaira al [phone].    We comply with applicable federal civil rights laws and [place] laws. We do not discriminate on the basis of race, color, national origin, age, disability, sex, sexual orientation, or gender identity.            Thank you!     Thank you for choosing [facility] MATERNAL FETAL MEDICINE [facility]  for your care. Our goal is always to provide you with excellent care. Hearing back from our patients is one way we can continue to improve our services. Please take a few minutes to complete the written survey that you may receive in the mail after your visit with us. Thank you!             Your Updated Medication List - Protect others around you: Learn how to safely use, store and throw away your medicines at www.disposemymeds.org.          This list is accurate as of 4/26/18  5:21 PM.  Always use your most recent med list.                   Brand Name Dispense Instructions for use Diagnosis    acetaminophen 500 MG Caps      Take 1 capsule by mouth as needed.        calcium-vitamin D-vitamin K 500-500-40 MG-UNT-MCG Chew    VIACTIV     Take 1 tablet by mouth daily.        citalopram 20 MG tablet    celeXA    30 tablet    Take 1/2 tablet (10 mg) for 1-2 weeks, then increase to 1 tablet orally daily    Anxiety       * cyclobenzaprine 10 MG tablet    FLEXERIL    42 tablet    Take 1 tablet (10 mg) by mouth 3 times daily as needed for muscle spasms    Back pain, unspecified back location, unspecified back pain laterality, unspecified chronicity       * cyclobenzaprine 5 MG tablet    FLEXERIL    20 tablet    Take 1 tablet (5 mg) by mouth nightly  as needed for muscle spasms    Bill-Danlos syndrome       gabapentin 300 MG capsule    NEURONTIN     Take 600 mg by mouth 3 times daily 1 1/2 po in P.M        oseltamivir 75 MG capsule    TAMIFLU    10 capsule    Take 1 capsule (75 mg) by mouth 2 times daily    Influenza A       oxyCODONE-acetaminophen 5-325 MG per tablet    PERCOCET    40 tablet    Take 1-2 tablets by mouth every 4 hours as needed for moderate to severe pain    S/P  section       PAXIL 10 MG tablet   Generic drug:  PARoxetine      Take 10 mg by mouth every morning        prenatal multivitamin plus iron 27-0.8 MG Tabs per tablet      Take 1 tablet by mouth daily        RITALIN LA 10 MG Cp24   Generic drug:  methylphenidate      Take 30 mg by mouth 2 tabs of 10mg in A.M and 1  10mg in P.M        sertraline 50 MG tablet    ZOLOFT     Take 50 mg by mouth        sildenafil 20 MG tablet    REVATIO    90 tablet    Take 1 tablet by mouth 3 times daily.    Vascular abnormality, PAD (peripheral artery disease) (H)       VITAMIN D3 PO      Take 1,000 Units by mouth daily.        * Notice:  This list has 2 medication(s) that are the same as other medications prescribed for you. Read the directions carefully, and ask your doctor or other care provider to review them with you.

## 2018-04-26 NOTE — PROGRESS NOTES
E&M15  Pt presents to [facility] for assessment and evaluation of her pregnancy due to EDS. Pt states she is doing well. Pt states +fm, no lof or bleeding. No contractions to report. Us done and reviewed by Dr. Phillips. See epic for today's findings. Obv done. See flow sheet. Pt seen today by Dr. Peres and Dr. Phillips. Pt wanting to do her 1 hour GCT at [facility] within the next week. Will return to [facility] in 4 weeks for Rl2 and obv at that time. Given a prescription for a maternity support belt as she is having sciatic leg pain. Pt states she restarted her Celexa and is feeling better re anxiety. Knows when to call with further questions or come in with concerns. Dc stable at this time. Teresa Gallardo RN

## 2018-05-11 ENCOUNTER — TELEPHONE (OUTPATIENT)
Dept: MATERNAL FETAL MEDICINE | Facility: CLINIC | Age: 23
End: 2018-05-11

## 2018-05-11 NOTE — TELEPHONE ENCOUNTER
Phone call to pt to remind her of 1 hour glucose test that needs to be done. Pt states she will call lab and try to have done before her next visit her on 5/24. Order has been placed. Teresa Gallardo RN

## 2018-05-22 DIAGNOSIS — Q79.60 EHLERS-DANLOS SYNDROME: ICD-10-CM

## 2018-05-22 DIAGNOSIS — Z33.1 PREGNANCY, INCIDENTAL: ICD-10-CM

## 2018-05-22 LAB — GLUCOSE 1H P 50 G GLC PO SERPL-MCNC: 118 MG/DL (ref 60–129)

## 2018-05-22 PROCEDURE — 36415 COLL VENOUS BLD VENIPUNCTURE: CPT | Performed by: OBSTETRICS & GYNECOLOGY

## 2018-05-22 PROCEDURE — 82950 GLUCOSE TEST: CPT | Performed by: OBSTETRICS & GYNECOLOGY

## 2018-05-24 ENCOUNTER — HOSPITAL ENCOUNTER (OUTPATIENT)
Dept: ULTRASOUND IMAGING | Facility: CLINIC | Age: 23
Discharge: HOME | End: 2018-05-24
Attending: OBSTETRICS & GYNECOLOGY | Admitting: OBSTETRICS & GYNECOLOGY
Payer: COMMERCIAL

## 2018-05-24 ENCOUNTER — OFFICE VISIT (OUTPATIENT)
Dept: MATERNAL FETAL MEDICINE | Facility: CLINIC | Age: 23
End: 2018-05-24
Attending: OBSTETRICS & GYNECOLOGY
Payer: COMMERCIAL

## 2018-05-24 VITALS
DIASTOLIC BLOOD PRESSURE: 67 MMHG | HEART RATE: 97 BPM | WEIGHT: 147.4 LBS | SYSTOLIC BLOOD PRESSURE: 117 MMHG | OXYGEN SATURATION: 97 % | BODY MASS INDEX: 23.09 KG/M2 | RESPIRATION RATE: 18 BRPM

## 2018-05-24 DIAGNOSIS — Q79.60 EHLERS-DANLOS SYNDROME: ICD-10-CM

## 2018-05-24 DIAGNOSIS — Z33.1 PREGNANCY, INCIDENTAL: ICD-10-CM

## 2018-05-24 LAB
ERYTHROCYTE [DISTWIDTH] IN BLOOD BY AUTOMATED COUNT: 12.3 % (ref 10–15)
HCT VFR BLD AUTO: 33.2 % (ref 35–47)
HGB BLD-MCNC: 10.9 G/DL (ref 11.7–15.7)
MCH RBC QN AUTO: 30.8 PG (ref 26.5–33)
MCHC RBC AUTO-ENTMCNC: 32.8 G/DL (ref 31.5–36.5)
MCV RBC AUTO: 94 FL (ref 78–100)
PLATELET # BLD AUTO: 267 10E9/L (ref 150–450)
RBC # BLD AUTO: 3.54 10E12/L (ref 3.8–5.2)
WBC # BLD AUTO: 10.9 10E9/L (ref 4–11)

## 2018-05-24 PROCEDURE — 90471 IMMUNIZATION ADMIN: CPT | Mod: ZF

## 2018-05-24 PROCEDURE — 86780 TREPONEMA PALLIDUM: CPT | Performed by: OBSTETRICS & GYNECOLOGY

## 2018-05-24 PROCEDURE — 36415 COLL VENOUS BLD VENIPUNCTURE: CPT | Performed by: OBSTETRICS & GYNECOLOGY

## 2018-05-24 PROCEDURE — 85027 COMPLETE CBC AUTOMATED: CPT | Performed by: OBSTETRICS & GYNECOLOGY

## 2018-05-24 PROCEDURE — 25000128 H RX IP 250 OP 636: Mod: ZF

## 2018-05-24 PROCEDURE — 76816 OB US FOLLOW-UP PER FETUS: CPT

## 2018-05-24 PROCEDURE — 90715 TDAP VACCINE 7 YRS/> IM: CPT | Mod: ZF

## 2018-05-24 PROCEDURE — G0463 HOSPITAL OUTPT CLINIC VISIT: HCPCS | Mod: 25,ZF

## 2018-05-24 ASSESSMENT — PAIN SCALES - GENERAL: PAINLEVEL: NO PAIN (0)

## 2018-05-24 NOTE — MR AVS SNAPSHOT
After Visit Summary   5/24/2018    Anjelica Holloway    MRN: [id number]           Patient Information     Date Of Birth          1995        Visit Information        Provider Department      5/24/2018 2:15 PM Jg Phillips MD [facility] Maternal Fetal Medicine [facility]        Today's Diagnoses     Ehler's-Danlos syndrome        Pregnancy, incidental           Follow-ups after your visit        Additional Services     [facility] Office Visit                 Your next 10 appointments already scheduled     Jun 19, 2018 12:30 PM CDT   (Arrive by 12:15 PM)   Return Vascular Visit with Lizz Gutierrez MD   [facility] ([facility])    [address] [place] [address]  [address] [place] [id number] [phone]              Future tests that were ordered for you today     Open Future Orders        Priority Expected Expires Ordered    [facility] US Comprehensive Single F/U Routine  5/24/2019 5/24/2018    [facility] Office Visit Routine 6/21/2018 5/24/2019 5/24/2018            Who to contact     If you have questions or need follow up information about today's clinic visit or your schedule please contact [facility] MATERNAL FETAL MEDICINE [facility] directly at [phone].  Normal or non-critical lab and imaging results will be communicated to you by MyChart, letter or phone within 4 business days after the clinic has received the results. If you do not hear from us within 7 days, please contact the clinic through pMDsofthart or phone. If you have a critical or abnormal lab result, we will notify you by phone as soon as possible.  Submit refill requests through Depop or call your pharmacy and they will forward the refill request to us. Please allow 3 business days for your refill to be completed.          Additional Information About Your Visit        MyChart Information     Depop lets you send messages to your doctor, view your test results, renew your prescriptions, schedule appointments and  "more. To sign up, go to [email or web address] . Click on \"Log in\" on the left side of the screen, which will take you to the Welcome page. Then click on \"Sign up Now\" on the right side of the page.     You will be asked to enter the access code listed below, as well as some personal information. Please follow the directions to create your username and password.     Your access code is: [email or web address]  Expires: 2018  4:40 PM     Your access code will  in 90 days. If you need help or a new code, please call your [facility] clinic or [phone].        Care EveryWhere ID     This is your Care EveryWhere ID. This could be used by other organizations to access your [facility] medical records  [id number]        Your Vitals Were     Pulse Respirations Last Period Pulse Oximetry BMI (Body Mass Index)       97 18 10/30/2017 97% 23.09 kg/m2        Blood Pressure from Last 3 Encounters:   18 117/67   18 102/57   18 106/55    Weight from Last 3 Encounters:   18 66.9 kg (147 lb 6.4 oz)   18 63.4 kg (139 lb 12.8 oz)   18 58.6 kg (129 lb 3.2 oz)              We Performed the Following     CBC with Platelets     MFM Office Visit     TDAP VACCINE (BOOSTRIX)     Treponema Abs w Reflex to RPR and Titer          Today's Medication Changes          These changes are accurate as of 18  2:59 PM.  If you have any questions, ask your nurse or doctor.               Start taking these medicines.        Dose/Directions    study - Tdap (tetanus-diptheria-acell pertussis) 5-2.5-18.5 LF-MCG/0.5 injection   Commonly known as:  BOOSTRIX IDS #[id number]   Used for:  Bill-Danlos syndrome, Pregnancy, incidental   Started by:  Jg Phillips MD        Dose:  0.5 mL   Inject 0.5 mLs into the muscle once for 1 dose   Quantity:  0.5 mL   Refills:  0         These medicines have changed or have updated prescriptions.        Dose/Directions    citalopram 20 MG tablet   Commonly known as:  celeXA   This may " have changed:    - how much to take  - when to take this  - additional instructions   Used for:  Anxiety        Take 1/2 tablet (10 mg) for 1-2 weeks, then increase to 1 tablet orally daily   Quantity:  30 tablet   Refills:  3            Where to get your medicines      Some of these will need a paper prescription and others can be bought over the counter.  Ask your nurse if you have questions.     You don't need a prescription for these medications     study - Tdap (tetanus-diptheria-acell pertussis) 5-2.5-18.5 LF-MCG/0.5 injection                Primary Care Provider Office Phone # Fax #    Adrianareji YANETH Bowen [phone]       [facility] [address] [facility] [address]        Equal Access to Services     JULIA ROGERS : Lizandro Witt, waaxstefany luqadaha, qaybta kaalmada gary, sivan willis. So [facility] [phone].    ATENCIÓN: Si habla español, tiene a echavarria disposición servicios gratuitos de asistencia lingüística. [facility] [phone].    We comply with applicable federal civil rights laws and [place] laws. We do not discriminate on the basis of race, color, national origin, age, disability, sex, sexual orientation, or gender identity.            Thank you!     Thank you for choosing [facility]  for your care. Our goal is always to provide you with excellent care. Hearing back from our patients is one way we can continue to improve our services. Please take a few minutes to complete the written survey that you may receive in the mail after your visit with us. Thank you!             Your Updated Medication List - Protect others around you: Learn how to safely use, store and throw away your medicines at www.disposemymeds.org.          This list is accurate as of 5/24/18  2:59 PM.  Always use your most recent med list.                   Brand Name Dispense Instructions for use Diagnosis    acetaminophen 500 MG  Caps      Take 1 capsule by mouth as needed.        calcium-vitamin D-vitamin K 500-500-40 MG-UNT-MCG Chew    VIACTIV     Take 1 tablet by mouth daily.        citalopram 20 MG tablet    celeXA    30 tablet    Take 1/2 tablet (10 mg) for 1-2 weeks, then increase to 1 tablet orally daily    Anxiety       * cyclobenzaprine 10 MG tablet    FLEXERIL    42 tablet    Take 1 tablet (10 mg) by mouth 3 times daily as needed for muscle spasms    Back pain, unspecified back location, unspecified back pain laterality, unspecified chronicity       * cyclobenzaprine 5 MG tablet    FLEXERIL    20 tablet    Take 1 tablet (5 mg) by mouth nightly as needed for muscle spasms    Bill-Danlos syndrome       gabapentin 300 MG capsule    NEURONTIN     Take 600 mg by mouth 3 times daily 1 1/2 po in P.M        oseltamivir 75 MG capsule    TAMIFLU    10 capsule    Take 1 capsule (75 mg) by mouth 2 times daily    Influenza A       oxyCODONE-acetaminophen 5-325 MG per tablet    PERCOCET    40 tablet    Take 1-2 tablets by mouth every 4 hours as needed for moderate to severe pain    S/P  section       PAXIL 10 MG tablet   Generic drug:  PARoxetine      Take 10 mg by mouth every morning        prenatal multivitamin plus iron 27-0.8 MG Tabs per tablet      Take 1 tablet by mouth daily        RITALIN LA 10 MG Cp24   Generic drug:  methylphenidate      Take 30 mg by mouth 2 tabs of 10mg in A.M and 1  10mg in P.M        sertraline 50 MG tablet    ZOLOFT     Take 50 mg by mouth        sildenafil 20 MG tablet    REVATIO    90 tablet    Take 1 tablet by mouth 3 times daily.    Vascular abnormality, PAD (peripheral artery disease) (H)       study - Tdap (tetanus-diptheria-acell pertussis) 5-2.5-18.5 LF-MCG/0.5 injection    BOOSTRIX IDS #[id number]    0.5 mL    Inject 0.5 mLs into the muscle once for 1 dose    Bill-Danlos syndrome, Pregnancy, incidental       VITAMIN D3 PO      Take 1,000 Units by mouth daily.        * Notice:  This list has 2  medication(s) that are the same as other medications prescribed for you. Read the directions carefully, and ask your doctor or other care provider to review them with you.

## 2018-05-24 NOTE — PROGRESS NOTES
Maternal-Fetal Medicine  OB follow up visit      Subjective: Doing well, Denies VB, LOF, ctx. Normal fetal movement.      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 She is currently doing well.     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 Initially followed by Dr. Billingsley who passed away, now follows with Dr. Gutierrez. See genetic counseling notes from previous pregnancy or consultation for further details  -Last pregnancy had growth US q4wks and found reassuring, so are doing this pregnancy as well  -Desires repeat CS  -Plan anesthesia consult @ 32 weeks  -PTL precautions     2) Vascular spasms/peripheral vascular disease  -Followed by cardiology (Dr. Gutierrez), last seen on 17, next visit 2018  -On Sildenafil for symptomatic relief and flexeril rarely PRN  -Increased blood flow during pregnancy may actually improve her symptoms and small vessel disease.  -Aggressive preventive measures will continue, such as warm socks at all times and avoiding prolonged cold exposures.   3) Anxiety  -Did not tolerate zoloft which she tried for a week. On Celexa 5mg/day and tolerating well  Previously reviewed that Celexa has been associated with risk of fetal primary pulmonary hypertension and withdrawal symptoms if taken closer to time of delivery. However, benefits outweigh  "risks if needed for anxiety. Will reassess need for medication close to delivery  -On paxil prior to pregnancy.     4) Parvovirus exposure  -Testing shows immune to Parvovirus     5) Sciatica, left side: Discussed maternity support belt and PT as options. Would like to try support belt. Can do stretches at home and will let us know if wants to try PT     6) Prenatal care:  -A+/Ab neg, HIV neg, HepBAg NR, RPR neg  - Rubella Equivocal, will offer MMR postpartum  -GC/Chlamydia and Ucx neg/NG @ last visit  -Genetic: quad screen neg  -   - 3rd trim labs and Tdap today  - GBS ~35 weeks  -Weight gain: BMI 18, +18lbs to date, 25-30lb weight gain goal, on track  -FLNA gene mutation:  genetic evaluation of baby for possible otopalatodigital spectrum disorders after birth  -Delivery plan: desires repeat CS at 39 weeks unless indication for earlier delivery  - Contraception:  -breastfeeding     Plan  CBC, RPR, Tdap  Growth US q3-4 weeks  RTC in 4 weeks    I served as a scribe for Dr.Rauk Janet Sheriff MD  Maternal-Fetal Medicine fellow  May 24, 2018     Please see \"Imaging\" tab under \"Chart Review\" for details of today's US at the [facility].    This note, as scribed, accurately reflects the examination, my impressions and plan as discussed with the patient.    Jg Phillips MD  Maternal-Fetal Medicine        "

## 2018-05-25 LAB — T PALLIDUM AB SER QL: NONREACTIVE

## 2018-06-18 ENCOUNTER — ANESTHESIA (OUTPATIENT)
Dept: ANESTHESIOLOGY | Facility: CLINIC | Age: 23
End: 2018-06-18

## 2018-06-18 ENCOUNTER — ANESTHESIA EVENT (OUTPATIENT)
Dept: ANESTHESIOLOGY | Facility: CLINIC | Age: 23
End: 2018-06-18

## 2018-06-19 ENCOUNTER — HOSPITAL ENCOUNTER (OUTPATIENT)
Dept: ULTRASOUND IMAGING | Facility: CLINIC | Age: 23
Discharge: HOME | End: 2018-06-19
Attending: OBSTETRICS & GYNECOLOGY | Admitting: OBSTETRICS & GYNECOLOGY
Payer: COMMERCIAL

## 2018-06-19 ENCOUNTER — OFFICE VISIT (OUTPATIENT)
Dept: MATERNAL FETAL MEDICINE | Facility: CLINIC | Age: 23
End: 2018-06-19
Attending: OBSTETRICS & GYNECOLOGY
Payer: COMMERCIAL

## 2018-06-19 VITALS
WEIGHT: 153.6 LBS | SYSTOLIC BLOOD PRESSURE: 117 MMHG | DIASTOLIC BLOOD PRESSURE: 59 MMHG | BODY MASS INDEX: 24.06 KG/M2 | HEART RATE: 91 BPM

## 2018-06-19 DIAGNOSIS — O26.93 COMPLICATION OF PREGNANCY IN THIRD TRIMESTER: ICD-10-CM

## 2018-06-19 DIAGNOSIS — Z33.1 PREGNANCY, INCIDENTAL: ICD-10-CM

## 2018-06-19 DIAGNOSIS — O09.93 SUPERVISION OF HIGH RISK PREGNANCY IN THIRD TRIMESTER: Primary | ICD-10-CM

## 2018-06-19 DIAGNOSIS — Q79.60 EHLERS-DANLOS SYNDROME: ICD-10-CM

## 2018-06-19 PROCEDURE — G0463 HOSPITAL OUTPT CLINIC VISIT: HCPCS | Mod: 25,ZF

## 2018-06-19 PROCEDURE — 76816 OB US FOLLOW-UP PER FETUS: CPT

## 2018-06-19 NOTE — ANESTHESIA PREPROCEDURE EVALUATION
Anesthesia Evaluation     . Pt has had prior anesthetic. Type: Regional    No history of anesthetic complications          ROS/MED HX    ENT/Pulmonary:       Neurologic:       Cardiovascular: Comment: POTS Syndrome        METS/Exercise Tolerance:  >4 METS   Hematologic: Comments: Peripheral Artery Disease, Congenital Vascular Abnormalities (anomaly of blood flow to lower extremities) - patient taking silfenafil 20mg QD        Musculoskeletal: Comment: EDS with joint hypermobility        GI/Hepatic: Comment: Meckel's diverticulum    (+) GERD (Peptic Ulcer Disease)       Renal/Genitourinary:         Endo:         Psychiatric: Comment: ADHD    (+) psychiatric history other (comment)      Infectious Disease:         Malignancy:         Other: Comment: 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, vascular malformation                              PAC Discussion and Assessment    ASA Classification: 3  [facility] is suitable [facility]  Anesthetic techniques and relevant risks discussed: Regional  Invasive monitoring and risk discussed:   Types:   Possibility and Risk of blood transfusion discussed:   NPO instructions given:   Additional anesthetic preparation and risks discussed: Regional anesthesia for procedure  Needs early admission to pre-op area:   Other: Needs Coags, CBC, and Type and Cross drawn day of     PAC Resident/NP Anesthesia Assessment:        Mid-Level Provider/Resident:   Date:   Time:     Attending Anesthesiologist Anesthesia Assessment:  21 YO F presents for OB Anesthesia consultation due to EDS and scheduled  at the end of July. She has a history of one prior  in 2015 performed under spinal anesthesia without any complications or issues. She is currently taking sildenafil 20mg daily due to vascular anomalies in b/l LE and PAD. We discussed risks of neuraxial anesthesia, including risk of bleeding and need to check coagulation labs and CBC on day of surgery. She  does not need to stop taking her sildenafil as this is not a contraindication to intrathecal injection.  She was taking sildenafil with her first pregnancy and did not hold the medication prior to her first .  Although the backup plan is always general anesthesia, she was reassured that we will plan on regional anesthesia for the  assuming all labs are WNL.         Anesthesiologist:   Date:   Time:   Pass/Fail:   Disposition:     PAC Pharmacist Assessment:        Pharmacist:   Date:   Time:

## 2018-06-19 NOTE — PROGRESS NOTES
Maternal Fetal Medicine OB Follow-up    Anjelica Holloway  : 1995  MRN: [id number]    CC: OB Follow-up    Subjective:  Anjelica Holloway is a 22 year old  at 33w1d presenting for routine OB follow-up. Today, she is feeling well.  She has been having occasional [place]-Aguilar contractions, but denies regular, painful contractions.  She denies loss of fluid or vaginal bleeding.  Endorses fetal movement.  She also denies any recent fevers/chills, headaches or changes in vision, RUQ pain, nausea or vomiting, constipation, diarrhea or other systemic symptoms.  She has occasional leg cramping at the end of the day, but no other concerns.     OB Hx:  Obstetric History       T1      L1     SAB0   TAB0   Ectopic0   Multiple0   Live Births1       # Outcome Date GA Lbr Lewis/2nd Weight Sex Delivery Anes PTL Lv   2 Current            1 Term 05/20/15 38w0d  3.204 kg (7 lb 1 oz) F CS-LTranv  N KEYON      Apgar1:  9                Apgar5: 9        Problem List:  Patient Active Problem List   Diagnosis     PAD (peripheral artery disease) (H)     History of Meckel's diverticulum     S/P cholecystectomy     Attention deficit disorder of childhood     Corpus albicans cyst of ovary     Ehler's-Danlos syndrome     Vascular abnormality     Other current maternal conditions classifiable elsewhere, antepartum     S/P  section     Objective:  /59 (BP Location: Right arm, Patient Position: Chair, Cuff Size: Adult Regular)  Pulse 91  Wt 69.7 kg (153 lb 9.6 oz)  LMP 10/30/2017  BMI 24.06 kg/m2    Gen: NAD  Abdominal: gravid, non-tender  Doptones: per US  SVE- deferred    OB Ultrasounds:  US today, see report for details    Assessment/Plan:  22 year old  at 33w1d by LMP c/w 7w1d US with history of EDS (possible type 3), 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  She is doing well today, here for GINA.    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 Follows with Dr. Gutierrez. See genetic counseling notes from previous pregnancy or consultation for further details.  -Growth US q4wks  -Desires repeat CS  -Anesthesia consult today  -PTL precautions      #Vascular spasms/peripheral vascular disease  -Followed by cardiology (Dr. Gutierrez), last seen on 17, next visit 2018  -On Sildenafil for symptomatic relief and flexeril rarely PRN  -Increased blood flow during pregnancy may actually improve her symptoms and small vessel disease.  -Aggressive preventive measures will continue, such as warm socks at all times and avoiding prolonged cold exposures.   #Anxiety:  Did not tolerate zoloft which she tried for a week. On Celexa 5mg/day and tolerating well.  Previously reviewed that Celexa has been associated with risk of fetal primary pulmonary hypertension and withdrawal symptoms if taken closer to time of delivery. However, benefits outweigh risks if needed for anxiety. - Will reassess need for medication close to delivery  -On paxil prior to pregnancy.      #Parvovirus exposure  -Testing shows immune to Parvovirus      #Sciatica, left side: No complaints today.  Previously had discussed maternity support belt and PT as options. Previously declined PT, will let us know if she is interested in a referral.    #Routine PNC:  - NOB Labs reviewed: Rh +, ANNIE neg, Hep B SAg NR, HIV NR, RPR neg, GC/Chlam neg, , s/p TDap   - Rubella equivocal, will offer MMR postpartum  - Weight gain: BMI 18, +22lbs to date, 25-30lb weight gain goal  -FLNA gene mutation:  genetic evaluation of baby for possible otopalatodigital spectrum disorders after birth  - Anatomy scan: 3/8/18, normal  - Genetic screening: Quad screen neg  -  testing: q4  week growth   - GBS at 35w    #Delivery planning:  - Plans for repeat  section at 38 weeks  - Feeding: breastfeeding  - Contraception plans: not discussed  - Labor precautions given    Patient seen and discussed with Dr. Stroud    RTC 2 week    Soheila Morin MD  OBGYN PGY1  2018 10:35 AM    Physician Attestation   I, Jatinder Stroud, saw this patient and agree with the findings and plan of care as documented in the note.      Items personally reviewed/procedural attestation: vitals and imaging and agree with the interpretation documented in the note.    Jatinder Stroud MD     I spent a total of 10 minutes face-to-face with Anjelica Holloway during today's office visit.  Over 50% of this time was spent counseling the patient and/or coordinating care regarding pregnancy complicated by maternal EDS and vascular disease.  See note for details.    Jatinder Stroud

## 2018-06-19 NOTE — MR AVS SNAPSHOT
After Visit Summary   6/19/2018    Anjelica Holloway    MRN: [id number]           Patient Information     Date Of Birth          1995        Visit Information        Provider Department      6/19/2018 10:15 AM Jatinder Stroud MD [facility] Maternal Fetal Medicine [facility]        Today's Diagnoses     Supervision of high risk pregnancy in third trimester    -  1    Complication of pregnancy in third trimester           Follow-ups after your visit        Additional Services     MFM Office Visit                 Your next 10 appointments already scheduled     Jun 28, 2018 11:45 AM CDT   Ob Follow Up with UR EXAM RM 1   [facility] Maternal Fetal Medicine [facility] ([facility])    [address]  [place] [address]   [phone]              Future tests that were ordered for you today     Open Standing Orders        Priority Remaining Interval Expires Ordered    MFM Office Visit Routine 3/3  6/19/2019 6/19/2018          Open Future Orders        Priority Expected Expires Ordered    MFM US Comprehensive Single F/U Routine 7/17/2018 6/19/2019 6/19/2018            Who to contact     If you have questions or need follow up information about today's clinic visit or your schedule please contact [facility] MATERNAL FETAL MEDICINE [facility] directly at [phone].  Normal or non-critical lab and imaging results will be communicated to you by MyChart, letter or phone within 4 business days after the clinic has received the results. If you do not hear from us within 7 days, please contact the clinic through MyChart or phone. If you have a critical or abnormal lab result, we will notify you by phone as soon as possible.  Submit refill requests through [employer] or call your pharmacy and they will forward the refill request to us. Please allow 3 business days for your refill to be completed.          Additional Information About Your Visit        MyChart Information      "[employer]AG lets you send messages to your doctor, view your test results, renew your prescriptions, schedule appointments and more. To sign up, go to [email or web address] . Click on \"Log in\" on the left side of the screen, which will take you to the Welcome page. Then click on \"Sign up Now\" on the right side of the page.     You will be asked to enter the access code listed below, as well as some personal information. Please follow the directions to create your username and password.     Your access code is: [email or web address]  Expires: 2018  6:33 PM     Your access code will  in 90 days. If you need help or a new code, please call your [facility] or [phone].        Care EveryWhere ID     This is your Care EveryWhere ID. This could be used by other organizations to access your [facility] medical records  [id number]        Your Vitals Were     Pulse Last Period BMI (Body Mass Index)             91 10/30/2017 24.06 kg/m2          Blood Pressure from Last 3 Encounters:   18 117/59   18 117/67   18 102/57    Weight from Last 3 Encounters:   18 69.7 kg (153 lb 9.6 oz)   18 66.9 kg (147 lb 6.4 oz)   18 63.4 kg (139 lb 12.8 oz)              We Performed the Following     [facility] Office Visit          Today's Medication Changes          These changes are accurate as of 18 11:59 PM.  If you have any questions, ask your nurse or doctor.               These medicines have changed or have updated prescriptions.        Dose/Directions    citalopram 20 MG tablet   Commonly known as:  celeXA   This may have changed:    - how much to take  - when to take this  - additional instructions   Used for:  Anxiety        Take 1/2 tablet (10 mg) for 1-2 weeks, then increase to 1 tablet orally daily   Quantity:  30 tablet   Refills:  3                Primary Care Provider Office Phone # Fax #    Zackery Bowen [phone]       [facility] [address] [facility] [address]        Equal Access to Services     [facility] : Hadii rey peterson juancarlosrosaura Eduar, waarmandoda luqadaha, qaybta kacarmenstefany vega, sivan joesuellengaston willis. So [facility] [phone].    ATENCIÓN: Si habla español, tiene a echavarria disposición servicios gratuitos de asistencia lingüística. Yahaira al [phone].    We comply with applicable federal civil rights laws and [place] laws. We do not discriminate on the basis of race, color, national origin, age, disability, sex, sexual orientation, or gender identity.            Thank you!     Thank you for choosing [facility]  for your care. Our goal is always to provide you with excellent care. Hearing back from our patients is one way we can continue to improve our services. Please take a few minutes to complete the written survey that you may receive in the mail after your visit with us. Thank you!             Your Updated Medication List - Protect others around you: Learn how to safely use, store and throw away your medicines at www.disposemymeds.org.          This list is accurate as of 6/19/18 11:59 PM.  Always use your most recent med list.                   Brand Name Dispense Instructions for use Diagnosis    acetaminophen 500 MG Caps      Take 1 capsule by mouth as needed.        calcium-vitamin D-vitamin K 500-500-40 MG-UNT-MCG Chew    VIACTIV     Take 1 tablet by mouth daily.        citalopram 20 MG tablet    celeXA    30 tablet    Take 1/2 tablet (10 mg) for 1-2 weeks, then increase to 1 tablet orally daily    Anxiety       * cyclobenzaprine 10 MG tablet    FLEXERIL    42 tablet    Take 1 tablet (10 mg) by mouth 3 times daily as needed for muscle spasms    Back pain, unspecified back location, unspecified back pain laterality, unspecified chronicity       * cyclobenzaprine 5 MG tablet    FLEXERIL    20 tablet    Take 1 tablet (5 mg) by mouth nightly as needed for muscle spasms    Bill-Danlos  syndrome       gabapentin 300 MG capsule    NEURONTIN     Take 600 mg by mouth 3 times daily 1 1/2 po in P.M        oseltamivir 75 MG capsule    TAMIFLU    10 capsule    Take 1 capsule (75 mg) by mouth 2 times daily    Influenza A       oxyCODONE-acetaminophen 5-325 MG per tablet    PERCOCET    40 tablet    Take 1-2 tablets by mouth every 4 hours as needed for moderate to severe pain    S/P  section       PAXIL 10 MG tablet   Generic drug:  PARoxetine      Take 10 mg by mouth every morning        prenatal multivitamin plus iron 27-0.8 MG Tabs per tablet      Take 1 tablet by mouth daily        RITALIN LA 10 MG Cp24   Generic drug:  methylphenidate      Take 30 mg by mouth 2 tabs of 10mg in A.M and 1  10mg in P.M        sertraline 50 MG tablet    ZOLOFT     Take 50 mg by mouth        sildenafil 20 MG tablet    REVATIO    90 tablet    Take 1 tablet by mouth 3 times daily.    Vascular abnormality, PAD (peripheral artery disease) (H)       VITAMIN D3 PO      Take 1,000 Units by mouth daily.        * Notice:  This list has 2 medication(s) that are the same as other medications prescribed for you. Read the directions carefully, and ask your doctor or other care provider to review them with you.

## 2018-06-19 NOTE — NURSING NOTE
Anjelica seen in clinic today for growth US and OBV at 33w1d gestation (see report/notes). VSS. Pt denies bldg/lof/change in discharge/headache/vision changes/chest pain/SOB/edema. Anjelica states she is having occasional jad olivares contractions, but denies them being painful. Dr. Morin and Dr. Stroud met with pt and discussed POC. Plan to follow up in 1.5-2 weeks (patient prefers Thursdays). She will then have weekly OBV with a growth US in 4 weeks. Plan for c/s at 38 weeks. Anjelica will look at her calendar to see what days work best around 38 weeks. She will also bring pre-registration sheet to appointment next Thursday. She is currently driving up from [place], but they plan on moving to [place] after her baby is born, as the FOB is currently working in [place]. Pt discharged stable and ambulatory. She was escorted to the Surgery waiting area for her anesthesia consult. She also has a Cardiology f/u appointment later today.

## 2018-06-28 ENCOUNTER — OFFICE VISIT (OUTPATIENT)
Dept: MATERNAL FETAL MEDICINE | Facility: CLINIC | Age: 23
End: 2018-06-28
Attending: OBSTETRICS & GYNECOLOGY
Payer: COMMERCIAL

## 2018-06-28 VITALS
RESPIRATION RATE: 20 BRPM | HEART RATE: 93 BPM | BODY MASS INDEX: 24.2 KG/M2 | SYSTOLIC BLOOD PRESSURE: 112 MMHG | DIASTOLIC BLOOD PRESSURE: 68 MMHG | OXYGEN SATURATION: 99 % | WEIGHT: 154.5 LBS

## 2018-06-28 DIAGNOSIS — O26.93 COMPLICATION OF PREGNANCY IN THIRD TRIMESTER: ICD-10-CM

## 2018-06-28 DIAGNOSIS — Q79.60 EHLERS-DANLOS SYNDROME: Primary | ICD-10-CM

## 2018-06-28 DIAGNOSIS — O09.93 SUPERVISION OF HIGH RISK PREGNANCY IN THIRD TRIMESTER: ICD-10-CM

## 2018-06-28 PROCEDURE — G0463 HOSPITAL OUTPT CLINIC VISIT: HCPCS | Mod: ZF

## 2018-06-28 ASSESSMENT — PAIN SCALES - GENERAL: PAINLEVEL: NO PAIN (0)

## 2018-06-28 NOTE — MR AVS SNAPSHOT
After Visit Summary   6/28/2018    Anjelica Holloway    MRN: [id number]           Patient Information     Date Of Birth          1995        Visit Information        Provider Department      6/28/2018 11:45 AM Rosalio Urbano MD [facility] Maternal Fetal Medicine - [place]        Today's Diagnoses     Ehler's-Danlos syndrome    -  1    Supervision of high risk pregnancy in third trimester        Complication of pregnancy in third trimester           Follow-ups after your visit        Additional Services     MFM Office Visit  (Weekly)                Your next 10 appointments already scheduled     Jul 03, 2018  1:30 PM CDT   Ob Follow Up with UR EXAM RM 1   [facility] Maternal Fetal Medicine - [place] ([facility])    [address]  [facility] [address]   [phone]            Jul 12, 2018 10:15 AM CDT   Ob Follow Up with UR EXAM RM 1   [facility] Maternal Fetal Medicine - [place] ([facility])    [address]  [facility] [address]   [phone]            Jul 17, 2018  1:30 PM CDT   MFM US COMPRE SINGLE F/U with [id number]   [facility] Maternal Fetal Medicine Ultrasound - [place] ([facility])    [address]  [facility] [address] [phone]           Wear comfortable clothes and leave your valuables at home.            Jul 17, 2018  2:15 PM CDT   Ob Follow Up with UR EXAM RM 1   [facility] Maternal Fetal Medicine - [place] ([facility])    [address]  [facility] [address]   [phone]              Future tests that were ordered for you today     Open Standing Orders        Priority Remaining Interval Expires Ordered    MFM Office Visit Routine 2/2 Weekly 6/28/2019 6/28/2018          Open Future Orders        Priority Expected Expires Ordered    MFM US Comprehensive Single F/U Routine 7/17/2018 6/28/2019 6/28/2018           "  Who to contact     If you have questions or need follow up information about today's clinic visit or your schedule please contact [facility] MATERNAL FETAL MEDICINE [facility] directly at [phone].  Normal or non-critical lab and imaging results will be communicated to you by MyChart, letter or phone within 4 business days after the clinic has received the results. If you do not hear from us within 7 days, please contact the clinic through MyChart or phone. If you have a critical or abnormal lab result, we will notify you by phone as soon as possible.  Submit refill requests through Modanisa or call your pharmacy and they will forward the refill request to us. Please allow 3 business days for your refill to be completed.          Additional Information About Your Visit        [email or web address] Information     Modanisa lets you send messages to your doctor, view your test results, renew your prescriptions, schedule appointments and more. To sign up, go to [email or web address] . Click on \"Log in\" on the left side of the screen, which will take you to the Welcome page. Then click on \"Sign up Now\" on the right side of the page.     You will be asked to enter the access code listed below, as well as some personal information. Please follow the directions to create your username and password.     Your access code is: [email or web address]  Expires: 2018  6:33 PM     Your access code will  in 90 days. If you need help or a new code, please call your [facility] clinic or [phone].        Care EveryWhere ID     This is your Care EveryWhere ID. This could be used by other organizations to access your [facility] medical records  [id number]        Your Vitals Were     Pulse Respirations Last Period Pulse Oximetry BMI (Body Mass Index)       93 20 10/30/2017 99% 24.2 kg/m2        Blood Pressure from Last 3 Encounters:   18 112/68   18 117/59   18 117/67    Weight from Last 3 Encounters:   18 70.1 kg (154 " lb 8 oz)   06/19/18 69.7 kg (153 lb 9.6 oz)   05/24/18 66.9 kg (147 lb 6.4 oz)              We Performed the Following     MFM Office Visit          Today's Medication Changes          These changes are accurate as of 6/28/18  4:20 PM.  If you have any questions, ask your nurse or doctor.               These medicines have changed or have updated prescriptions.        Dose/Directions    citalopram 20 MG tablet   Commonly known as:  celeXA   This may have changed:    - how much to take  - when to take this  - additional instructions   Used for:  Anxiety        Take 1/2 tablet (10 mg) for 1-2 weeks, then increase to 1 tablet orally daily   Quantity:  30 tablet   Refills:  3                Primary Care Provider Office Phone # Fax #    Zackery Bowen [phone]       [facility] [address] [facility] [address]        Equal Access to Services     [facility] CenterSLIME : Hadii rey bauero Somelissa, waaxda luqadaha, qaybta kaalmada adeegyada, sivan andres . So [facility] [phone].    ATENCIÓN: Si habla español, tiene a echavarria disposición servicios gratuitos de asistencia lingüística. Llame al [phone].    We comply with applicable federal civil rights laws and [place] laws. We do not discriminate on the basis of race, color, national origin, age, disability, sex, sexual orientation, or gender identity.            Thank you!     Thank you for choosing [facility]  for your care. Our goal is always to provide you with excellent care. Hearing back from our patients is one way we can continue to improve our services. Please take a few minutes to complete the written survey that you may receive in the mail after your visit with us. Thank you!             Your Updated Medication List - Protect others around you: Learn how to safely use, store and throw away your medicines at www.disposemymeds.org.          This list is accurate as of 6/28/18   4:20 PM.  Always use your most recent med list.                   Brand Name Dispense Instructions for use Diagnosis    acetaminophen 500 MG Caps      Take 1 capsule by mouth as needed.        calcium-vitamin D-vitamin K 500-500-40 MG-UNT-MCG Chew    VIACTIV     Take 1 tablet by mouth daily.        citalopram 20 MG tablet    celeXA    30 tablet    Take 1/2 tablet (10 mg) for 1-2 weeks, then increase to 1 tablet orally daily    Anxiety       * cyclobenzaprine 10 MG tablet    FLEXERIL    42 tablet    Take 1 tablet (10 mg) by mouth 3 times daily as needed for muscle spasms    Back pain, unspecified back location, unspecified back pain laterality, unspecified chronicity       * cyclobenzaprine 5 MG tablet    FLEXERIL    20 tablet    Take 1 tablet (5 mg) by mouth nightly as needed for muscle spasms    Bill-Danlos syndrome       gabapentin 300 MG capsule    NEURONTIN     Take 600 mg by mouth 3 times daily 1 1/2 po in P.M        oseltamivir 75 MG capsule    TAMIFLU    10 capsule    Take 1 capsule (75 mg) by mouth 2 times daily    Influenza A       oxyCODONE-acetaminophen 5-325 MG per tablet    PERCOCET    40 tablet    Take 1-2 tablets by mouth every 4 hours as needed for moderate to severe pain    S/P  section       PAXIL 10 MG tablet   Generic drug:  PARoxetine      Take 10 mg by mouth every morning        prenatal multivitamin plus iron 27-0.8 MG Tabs per tablet      Take 1 tablet by mouth daily        RITALIN LA 10 MG Cp24   Generic drug:  methylphenidate      Take 30 mg by mouth 2 tabs of 10mg in A.M and 1  10mg in P.M        sertraline 50 MG tablet    ZOLOFT     Take 50 mg by mouth        sildenafil 20 MG tablet    REVATIO    90 tablet    Take 1 tablet by mouth 3 times daily.    Vascular abnormality, PAD (peripheral artery disease) (H)       VITAMIN D3 PO      Take 1,000 Units by mouth daily.        * Notice:  This list has 2 medication(s) that are the same as other medications prescribed for you. Read the  directions carefully, and ask your doctor or other care provider to review them with you.

## 2018-06-28 NOTE — NURSING NOTE
Anjelica here for f/u obv due to preg c/b EDS. Pt reports +FM, denies ctx, denies SRoM, and denies vag bleeding. Pt would like C/S scheduled for 7/23 at 10:30 am-message sent to [facility]  to request this date and time. Pt reports feeling well. Dr. Sheriff and Dr. Urbano in to see pt. Pt scheduled for weekly obv with growth 4 weeks from last week.  Pt reports some leg cramps and will try bananas and stretching. Pt left amb and stable. Thu Henriquez, RN

## 2018-06-28 NOTE — PROGRESS NOTES
Maternal Fetal Medicine OB Follow-up     Anjelica Holloway  : 1995  MRN: [id number]     CC: OB Follow-up     Subjective:  Anjelica Holloway is a 22 year old  at 34w3d  presenting for routine OB follow-up. Today, she is feeling well.  She has been having occasional Jai-Aguilar contractions, but denies regular, painful contractions.  She denies loss of fluid or vaginal bleeding.  Endorses fetal movement.  She also denies any recent fevers/chills, headaches or changes in vision, RUQ pain, nausea or vomiting, constipation, diarrhea or other systemic symptoms.  She has occasional leg cramping at the end of the day, but no other concerns.      OB Hx:               Obstetric History       T1      L1     SAB0   TAB0   Ectopic0   Multiple0   Live Births1        # Outcome Date GA Lbr Lewis/2nd Weight Sex Delivery Anes PTL Lv   2 Current                     1 Term 05/20/15 38w0d   3.204 kg (7 lb 1 oz) F CS-LTranv   N KEYON      Apgar1:  9                Apgar5: 9          Problem List:      Patient Active Problem List   Diagnosis     PAD (peripheral artery disease) (H)     History of Meckel's diverticulum     S/P cholecystectomy     Attention deficit disorder of childhood     Corpus albicans cyst of ovary     Ehler's-Danlos syndrome     Vascular abnormality     Other current maternal conditions classifiable elsewhere, antepartum     S/P  section      Objective:  Vitals: /78, HR 93, Weight 154.5 lb, O2 sat: 99%  Gen: NAD  Abdominal: gravid, non-tender  Doptones: per US  SVE- deferred     OB Ultrasounds:  US today, see report for details     Assessment/Plan:  22 year old  at 34w3d  by LMP c/w 7w1d US with history of EDS (possible type 3), 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  She is doing well today, here for GINA.     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 Follows with Dr. Gutierrez. See genetic counseling notes from previous pregnancy or consultation for further details.  -Growth US q4wks  -Desires repeat CS  -Anesthesia consult today  -PTL precautions      #Vascular spasms/peripheral vascular disease  -Followed by cardiology (Dr. Gutierrez), last seen on 17, next visit 2018  -On Sildenafil for symptomatic relief and flexeril rarely PRN  -Increased blood flow during pregnancy may actually improve her symptoms and small vessel disease.  -Aggressive preventive measures will continue, such as warm socks at all times and avoiding prolonged cold exposures.   #Anxiety:  Did not tolerate zoloft which she tried for a week. On Celexa 5mg/day and tolerating well.  Previously reviewed that Celexa has been associated with risk of fetal primary pulmonary hypertension and withdrawal symptoms if taken closer to time of delivery. However, benefits outweigh risks if needed for anxiety. - Will reassess need for medication close to delivery  -On paxil prior to pregnancy.      #Parvovirus exposure  -Testing shows immune to Parvovirus      #Sciatica, left side: No complaints today.  Previously had discussed maternity support belt and PT as options. Previously declined PT, will let us know if she is interested in a referral.     #Routine PNC:  - NOB Labs reviewed: Rh +, ANNIE neg, Hep B SAg NR, HIV NR, RPR neg, GC/Chlam neg, , s/p TDap   - Rubella equivocal, will offer MMR postpartum  - Weight gain: BMI 18, +22lbs to date, 25-30lb weight gain goal  -FLNA gene mutation:  genetic evaluation of baby for possible otopalatodigital spectrum disorders after birth  - Anatomy scan: 3/8/18, normal  - Genetic screening: Quad screen neg  -  testing: q4 week growth   -s/p anesthesia consult  - GBS next  visit     #Delivery planning:  - Plans for repeat  section at 38 weeks  - Feeding: breastfeeding  - Contraception plans: not discussed  - Labor precautions given    I served as a scribe for Dr. Sundeep Sheriff MD  Maternal-Fetal Medicine fellow  2018       Physician Attestation   I, Rosalio Urbano, saw and evaluated Anjelica Holloway with the fellow.      I have reviewed and discussed with Dr. Sheriff the patient history, physical exam and plan of care. I personally reviewed the vital signs, medications, lab results and imaging.    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    Rosalio Urbano  Date of Service (when I saw the patient): 18    Time Spent on this Encounter   I, Rosalio Urbano, spent a total of 10 minutes in face to face consultation today managing the care of Anjelica Holloway.  Over 50% of my time on the unit was spent counseling the patient and /or coordinating care regarding prenatal care. See note for details.

## 2018-07-03 ENCOUNTER — OFFICE VISIT (OUTPATIENT)
Dept: MATERNAL FETAL MEDICINE | Facility: CLINIC | Age: 23
End: 2018-07-03
Attending: OBSTETRICS & GYNECOLOGY
Payer: COMMERCIAL

## 2018-07-03 VITALS
WEIGHT: 158.5 LBS | BODY MASS INDEX: 24.82 KG/M2 | OXYGEN SATURATION: 100 % | HEART RATE: 98 BPM | SYSTOLIC BLOOD PRESSURE: 114 MMHG | DIASTOLIC BLOOD PRESSURE: 61 MMHG | RESPIRATION RATE: 18 BRPM

## 2018-07-03 DIAGNOSIS — Q79.60 EHLERS-DANLOS DISEASE: Primary | ICD-10-CM

## 2018-07-03 DIAGNOSIS — O09.93 SUPERVISION OF HIGH RISK PREGNANCY IN THIRD TRIMESTER: ICD-10-CM

## 2018-07-03 LAB — RUPTURE OF FETAL MEMBRANES BY ROM PLUS: NEGATIVE

## 2018-07-03 PROCEDURE — 87653 STREP B DNA AMP PROBE: CPT | Performed by: OBSTETRICS & GYNECOLOGY

## 2018-07-03 PROCEDURE — 84112 EVAL AMNIOTIC FLUID PROTEIN: CPT | Performed by: OBSTETRICS & GYNECOLOGY

## 2018-07-03 PROCEDURE — G0463 HOSPITAL OUTPT CLINIC VISIT: HCPCS | Mod: ZF

## 2018-07-03 NOTE — NURSING NOTE
Anjelica seen in clinic today for OBV at 35w1d gestation for EDS (see report/notes). VSS. Pt denies bldg/contractions/headache/vision changes/chest pain/SOB/edema. Positive fetal movement. Complains that her underwear are wet after using the bathroom. Denies smelly discharge, discolored discharge. Denies increased voiding or burning. RomPlus and GBS collected. RomPlus negative - patient informed of results and given SROM/PTL precautions. Dr. Morin and Dr. Iqbal met with pt and discussed POC. F/U OBV scheduled next week. Pt discharged stable and ambulatory.

## 2018-07-03 NOTE — PROGRESS NOTES
"Maternal Fetal Medicine Routine OB Visit    Anjelica Holloway  : 1995  MRN: [id number]    CC: OB Follow-up    Subjective:  Anjelica Holloway is a 23 year old  at 35w1d presenting for routine OB follow-up. Today, she is feeling well.  Continues to have rare [place]-Aguilar contractions.  Denies vaginal bleeding.  + Fetal movement.  Does report that she feels her underwear are \"wet\" occasionally.  Denies any foul smelling discharge or vulvar irritation. Denies any dysuria, urinary frequency, fevers/chills.  Otherwise doing well today.      OB Hx:  Obstetric History       T1      L1     SAB0   TAB0   Ectopic0   Multiple0   Live Births1       # Outcome Date GA Lbr Lewis/2nd Weight Sex Delivery Anes PTL Lv   2 Current            1 Term 05/20/15 38w0d  3.204 kg (7 lb 1 oz) F CS-LTranv  N KEYON      Apgar1:  9                Apgar5: 9        Problem List:  Patient Active Problem List   Diagnosis     PAD (peripheral artery disease) (H)     History of Meckel's diverticulum     S/P cholecystectomy     Attention deficit disorder of childhood     Corpus albicans cyst of ovary     Ehler's-Danlos syndrome     Vascular abnormality     Other current maternal conditions classifiable elsewhere, antepartum     S/P  section     Objective:  /61 (BP Location: Left arm, Patient Position: Sitting, Cuff Size: Adult Regular)  Pulse 98  Resp 18  Wt 71.9 kg (158 lb 8 oz)  LMP 10/30/2017  SpO2 100%  BMI 24.82 kg/m2    Gen: NAD, well appearing  Chest: breathing comfortably on room air  Abdominal: non-tender, gravid, fundus measuring 33cm  Ext: trace edema, non-tender, WWP  Doptones: 135 bpm    Labs:  ROM Plus negative    OB Ultrasounds:  Date  GA  Comments  18  33w1d  Normal growth  18  29w3d  Normal growth  18  25w3d  Normal growth  3/29/18  21w3d  Normal growth, cardiac views appeared normal  3/8/18  18w3d  Normal anatomy, cardiac views limited  18  14w3d  Normal " growth    Assessment/Plan:  23 year old  at 35w1d by  by LMP c/w 7w1d US with history of EDS (possible type 3), 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  She is doing well today, here for GINA.  Reported increased vaginal discharge today, ROM Plus negative.  Discussed it may be urine, sweat or physiologic discharge.  Unlikely ROM.      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 Follows with Dr. Gutierrez. See genetic counseling notes from previous pregnancy or consultation for further details.      #Vascular spasms/peripheral vascular disease  -Followed by cardiology (Dr. Gutierrez), last seen on 17, next visit 2018  -On Sildenafil for symptomatic relief and flexeril rarely PRN  -Increased blood flow during pregnancy may actually improve her symptoms and small vessel disease.  -Aggressive preventive measures will continue, such as warm socks at all times and avoiding prolonged cold exposures.   #Anxiety:  Did not tolerate zoloft which she tried for a week. On Celexa 5mg/day and tolerating well.  Previously reviewed that Celexa has been associated with risk of fetal primary pulmonary hypertension and withdrawal symptoms if taken closer to time of delivery. However, benefits outweigh risks if needed for anxiety.   - Will reassess need for medication close to delivery, patient currently stable on this medication  - On paxil prior to pregnancy.      #Parvovirus exposure  -Testing shows immune to Parvovirus      #Sciatica, left side: No complaints today.  Previously had discussed maternity support belt and PT as options. Previously declined PT, will let us know if she is interested in a referral.      #Routine PNC:  - NOB Labs  reviewed: Rh +, ANNIE neg, Hep B SAg NR, HIV NR, RPR neg, GC/Chlam neg, , s/p TDap   - Rubella equivocal, will offer MMR postpartum  - Weight gain: BMI 18, +37lbs to date, 25-30lb weight gain goal  - FLNA gene mutation:  genetic evaluation of baby for possible otopalatodigital spectrum disorders after birth  - Anatomy scan: 3/8/18, normal  - Genetic screening: Quad screen neg  -  testing: Q4 week growth  - S/p anesthesia consult  - GBS collected today      #Delivery planning:  - Plans for repeat  section at 38 weeks  - Feeding: breastfeeding  - Contraception plans: considering Mirena or Paragard - reports previously being told she shouldn't be on OCPs due to her EDS  -  labor precautions given    Patient seen and discussed with Dr. Iqbal    RTC 1 week    Soheila Morin MD  OBGYN PGY2  7/3/2018 1:38 PM      MFM Attending Physician Note    I, Felton Iqbal M.D., saw this patient with Dr. Morin the resident physician. I reviewed her entire note and agree with her history, physical, assessment and plan. Specifically, the Key Features involving the patient's Ehler's Danlos SyndromeType 3 and issues related to her pregnancy as well as her anxiety and delivery plan. I spent 15 minutes face to face with this patient discussing and counseling her on the management of her pregnancy.    Felton Iqbal M.D.  Specialist in Maternal-Fetal Medicine

## 2018-07-03 NOTE — MR AVS SNAPSHOT
After Visit Summary   7/3/2018    Anjelica Holloway    MRN: [id number]           Patient Information     Date Of Birth          1995        Visit Information        Provider Department      7/3/2018 1:30 PM Felton Iqbal MD [facility] Maternal Fetal Medicine [facility]        Today's Diagnoses     Bill-Danlos disease    -  1    Supervision of high risk pregnancy in third trimester           Follow-ups after your visit        Your next 10 appointments already scheduled     Jul 12, 2018 10:15 AM CDT   Ob Follow Up with UR EXAM RM 1   [facility] Maternal Fetal Medicine - [facility])    [address]  [facility] [place] [address]   [phone]            Jul 17, 2018  1:30 PM CDT   MFM US COMPRE SINGLE F/U with [id number]   [facility] Maternal Fetal Medicine Ultrasound [facility])    [address]  [place] [address] [phone]           Wear comfortable clothes and leave your valuables at home.            Jul 17, 2018  2:15 PM CDT   Ob Follow Up with UR EXAM RM 1   [facility] Maternal Fetal Medicine - [place] [facility])    [address]  [facility] [place] [address]   [phone]            Jul 23, 2018   Procedure with Jayne Smith MD   [facility] (--)    [address] [place] [address]  [place] [id number] [phone]              Who to contact     If you have questions or need follow up information about today's clinic visit or your schedule please contact [facility] MATERNAL FETAL MEDICINE [facility] directly at [phone].  Normal or non-critical lab and imaging results will be communicated to you by MyChart, letter or phone within 4 business days after the clinic has received the results. If you do not hear from us within 7 days, please contact the clinic through MyChart or phone. If you have a critical or abnormal lab result, we will notify you by  "phone as soon as possible.  Submit refill requests through [employer] or call your pharmacy and they will forward the refill request to us. Please allow 3 business days for your refill to be completed.          Additional Information About Your Visit        [employer] Information     [employer] lets you send messages to your doctor, view your test results, renew your prescriptions, schedule appointments and more. To sign up, go to [email or web address] . Click on \"Log in\" on the left side of the screen, which will take you to the Welcome page. Then click on \"Sign up Now\" on the right side of the page.     You will be asked to enter the access code listed below, as well as some personal information. Please follow the directions to create your username and password.     Your access code is: [email or web address]  Expires: 2018  6:33 PM     Your access code will  in 90 days. If you need help or a new code, please call your [place] clinic or [phone].        Care EveryWhere ID     This is your Care EveryWhere ID. This could be used by other organizations to access your [place] medical records  [id number]        Your Vitals Were     Pulse Respirations Last Period Pulse Oximetry BMI (Body Mass Index)       98 18 10/30/2017 100% 24.82 kg/m2        Blood Pressure from Last 3 Encounters:   18 114/61   18 112/68   18 117/59    Weight from Last 3 Encounters:   18 71.9 kg (158 lb 8 oz)   18 70.1 kg (154 lb 8 oz)   18 69.7 kg (153 lb 9.6 oz)              We Performed the Following     Group B strep PCR     MFM Office Visit     Rupture of Fetal Membranes by ROM Plus          Today's Medication Changes          These changes are accurate as of 7/3/18 11:59 PM.  If you have any questions, ask your nurse or doctor.               These medicines have changed or have updated prescriptions.        Dose/Directions    citalopram 20 MG tablet   Commonly known as:  celeXA   This may have changed:    - " how much to take  - when to take this  - additional instructions   Used for:  Anxiety        Take 1/2 tablet (10 mg) for 1-2 weeks, then increase to 1 tablet orally daily   Quantity:  30 tablet   Refills:  3                Primary Care Provider Office Phone # Fax #    Zackery Bowen [phone]       [facility] [address] [facility] [address]        Equal Access to Services     JULIA ROGERS : Hadii aad ku hadasho Soomaali, waaxda luqadaha, qaybta kaalmada adeegyada, waxay idiin hayaan adeeg tracie ladixieinocencia . So [facility] [phone].    ATENCIÓN: Si tonla espraven, tiene a echavarria disposición servicios gratuitos de asistencia lingüística. Lisaame al [phone].    We comply with applicable federal civil rights laws and [place] laws. We do not discriminate on the basis of race, color, national origin, age, disability, sex, sexual orientation, or gender identity.            Thank you!     Thank you for choosing [facility]  for your care. Our goal is always to provide you with excellent care. Hearing back from our patients is one way we can continue to improve our services. Please take a few minutes to complete the written survey that you may receive in the mail after your visit with us. Thank you!             Your Updated Medication List - Protect others around you: Learn how to safely use, store and throw away your medicines at www.disposemymeds.org.          This list is accurate as of 7/3/18 11:59 PM.  Always use your most recent med list.                   Brand Name Dispense Instructions for use Diagnosis    acetaminophen 500 MG Caps      Take 1 capsule by mouth as needed.        calcium-vitamin D-vitamin K 500-500-40 MG-UNT-MCG Chew    VIACTIV     Take 1 tablet by mouth daily.        citalopram 20 MG tablet    celeXA    30 tablet    Take 1/2 tablet (10 mg) for 1-2 weeks, then increase to 1 tablet orally daily    Anxiety       * cyclobenzaprine 10 MG  tablet    FLEXERIL    42 tablet    Take 1 tablet (10 mg) by mouth 3 times daily as needed for muscle spasms    Back pain, unspecified back location, unspecified back pain laterality, unspecified chronicity       * cyclobenzaprine 5 MG tablet    FLEXERIL    20 tablet    Take 1 tablet (5 mg) by mouth nightly as needed for muscle spasms    Bill-Danlos syndrome       gabapentin 300 MG capsule    NEURONTIN     Take 600 mg by mouth 3 times daily 1 1/2 po in P.M        oseltamivir 75 MG capsule    TAMIFLU    10 capsule    Take 1 capsule (75 mg) by mouth 2 times daily    Influenza A       oxyCODONE-acetaminophen 5-325 MG per tablet    PERCOCET    40 tablet    Take 1-2 tablets by mouth every 4 hours as needed for moderate to severe pain    S/P  section       PAXIL 10 MG tablet   Generic drug:  PARoxetine      Take 10 mg by mouth every morning        prenatal multivitamin plus iron 27-0.8 MG Tabs per tablet      Take 1 tablet by mouth daily        RITALIN LA 10 MG Cp24   Generic drug:  methylphenidate      Take 30 mg by mouth 2 tabs of 10mg in A.M and 1  10mg in P.M        sertraline 50 MG tablet    ZOLOFT     Take 50 mg by mouth        sildenafil 20 MG tablet    REVATIO    90 tablet    Take 1 tablet by mouth 3 times daily.    Vascular abnormality, PAD (peripheral artery disease) (H)       VITAMIN D3 PO      Take 1,000 Units by mouth daily.        * Notice:  This list has 2 medication(s) that are the same as other medications prescribed for you. Read the directions carefully, and ask your doctor or other care provider to review them with you.

## 2018-07-04 LAB
GP B STREP DNA SPEC QL NAA+PROBE: NEGATIVE
SPECIMEN SOURCE: NORMAL

## 2018-07-12 ENCOUNTER — OFFICE VISIT (OUTPATIENT)
Dept: MATERNAL FETAL MEDICINE | Facility: CLINIC | Age: 23
End: 2018-07-12
Attending: OBSTETRICS & GYNECOLOGY
Payer: COMMERCIAL

## 2018-07-12 VITALS
DIASTOLIC BLOOD PRESSURE: 63 MMHG | BODY MASS INDEX: 25.11 KG/M2 | WEIGHT: 160.3 LBS | SYSTOLIC BLOOD PRESSURE: 106 MMHG | HEART RATE: 98 BPM

## 2018-07-12 DIAGNOSIS — O09.93 SUPERVISION OF HIGH RISK PREGNANCY IN THIRD TRIMESTER: Primary | ICD-10-CM

## 2018-07-12 PROCEDURE — G0463 HOSPITAL OUTPT CLINIC VISIT: HCPCS | Mod: ZF

## 2018-07-12 NOTE — PROGRESS NOTES
Maternal-Fetal Medicine Prenatal Visit    Anjelica Holloway MRN# [id number]   Age: 23 year old  Estimated Date of Delivery: Aug 6, 2018            Gestational age: 36w3d YOB: 1995             Subjective:    She is doing well today, here for GINA. Denies contractions, LOF, VB. Reports + FM          Objective:        /63 (BP Location: Left arm, Patient Position: Sitting, Cuff Size: Adult Regular)  Pulse 98  Wt 72.7 kg (160 lb 4.8 oz)  LMP 10/30/2017  BMI 25.11 kg/m2       No results found for this or any previous visit (from the past 24 hour(s)).    FHT: 140  Fundal height 36cm, abdomen soft, non tender  Leopolds: Cephalic  Lower extremities: Cold to touch. Non-discolored. No edema or tenderness.     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 Follows with Dr. Gutierrez. See genetic counseling notes from previous pregnancy or consultation for further details.      #Vascular spasms/peripheral vascular disease  -Denies leg cramps or pain.   -Followed by cardiology (Dr. Gutierrez), last seen on 17  -On Sildenafil BID daily   -Increased blood flow during pregnancy may actually improve her symptoms and  small vessel disease.  -Aggressive preventive measures will continue, such as warm socks at all times and avoiding prolonged cold exposures.     #Anxiety:  Did not tolerate zoloft which she tried for a week. On Celexa 5mg/day and tolerating well.  Previously reviewed that Celexa has been associated with risk of fetal primary pulmonary hypertension and withdrawal symptoms if taken closer to time of delivery. However, benefits outweigh risks if needed for anxiety.   - Discussed with patient  risks and she states she is stable and would like to stop medication now- reviewed how to wean down.   - On paxil prior to pregnancy and plans to restart postpartum      #Parvovirus exposure  -Testing shows immune to Parvovirus      #Sciatica, left side: No complaints today.  Previously had discussed maternity support belt and PT as options. Previously declined PT, will let us know if she is interested in a referral.      #Routine PNC:  - NOB Labs reviewed: Rh +, ANNIE neg, Hep B SAg NR, HIV NR, RPR neg, GC/Chlam neg, , s/p TDap   - Rubella equivocal, will offer MMR postpartum  - Weight gain: BMI 18, +47lbs to date, 25-30lb weight gain goal  - FLNA gene mutation:  genetic evaluation of baby for possible otopalatodigital spectrum disorders after birth  - Anatomy scan: 3/8/18, normal  - Genetic screening: Quad screen neg  -  testing: Q4 week growth, most recent growth  59%ile, CAROL 2 ( 5LB 2 OZ) -Next growth next week   - S/p anesthesia consult  - GBS Negative            Plan:     - Wean celexa  -Plans for repeat  section at 38 weeks due to EDS with peripheral vascular disease, scheduled for 18. Patient aware.  - Feeding: breastfeeding  - Contraception plans: considering Mirena or Paragard - reports previously being told she shouldn't be on OCPs due to her EDS  -  labor precautions given.     Patient seen and discussed with Dr. Tabares     RTC 1 week      Jeancarlos  Ori Vazquez MD  Maternal-Fetal Medicine  July 12, 2018     Physician Attestation   I, Alis Tabares DO, saw and evaluated Anjelica Holloway with the fellow.  I have reviewed and discussed with Dr. Rehman the plan.     I personally reviewed the vital signs, medications, labs and imaging.    Key management decisions made by me: I agree with the plan outlined above by Dr. Rehman.      Alis Tabares DO  Date of Service (when I saw the patient): 7/12/18    Time Spent on this Encounter   I, Alis Tabares DO, spent a total of 15 minutes face to face or coordinating care of Anjelica Holloway.  Over 50% of my time on the unit was spent counseling the patient and/or coordinating care regarding EDS with peripheral vascular disease.

## 2018-07-12 NOTE — PROGRESS NOTES
E&M15  Pt presents to [facility] for assessment and evaluation of her pregnancy due to EDS. Pt states she is doing well. Offers no complaints at this time. Pt states +fm, no lof or bleeding. No contractions. Fht by marianne 140's. Has a Rl2 with obv next week. Has repeat c/s set for 7/23. Pt was seen by Dr. Rehman and Dr. Tabares. See flow sheets. Questions answered. Knows when to come in or call with questions. Dc stable at this time. Teresa Gallardo RN

## 2018-07-12 NOTE — MR AVS SNAPSHOT
After Visit Summary   7/12/2018    Anjelica Holloway    MRN: [id number]           Patient Information     Date Of Birth          1995        Visit Information        Provider Department      7/12/2018 10:15 AM Alis Tabares DO [facility] Maternal Fetal Medicine [facility]        Today's Diagnoses     Supervision of high risk pregnancy in third trimester    -  1       Follow-ups after your visit        Your next 10 appointments already scheduled     Jul 17, 2018  1:30 PM CDT   MFM US COMPRE SINGLE F/U with [id number]   [facility] Maternal Fetal Medicine Ultrasound - [place] ([facility])    [address]  [facility] [address] [phone]           Wear comfortable clothes and leave your valuables at home.            Jul 17, 2018  2:15 PM CDT   Ob Follow Up with UR EXAM RM 1   [facility] Maternal Fetal Medicine - [place] ([facility])    [address]  [facility] [address] [phone]            Jul 23, 2018   Procedure with Jayne Smith MD   UR 4COB (--)    [address] [place] [address]  [facility] [address] [phone]              Who to contact     If you have questions or need follow up information about today's clinic visit or your schedule please contact [facility] MATERNAL FETAL MEDICINE [facility] directly at [phone].  Normal or non-critical lab and imaging results will be communicated to you by MyChart, letter or phone within 4 business days after the clinic has received the results. If you do not hear from us within 7 days, please contact the clinic through MyChart or phone. If you have a critical or abnormal lab result, we will notify you by phone as soon as possible.  Submit refill requests through [employer] or call your pharmacy and they will forward the refill request to us. Please allow 3 business days for your refill to be completed.          Additional Information About Your  "Visit        Xfluentialhart Information     [employer] lets you send messages to your doctor, view your test results, renew your prescriptions, schedule appointments and more. To sign up, go to [email or web address] . Click on \"Log in\" on the left side of the screen, which will take you to the Welcome page. Then click on \"Sign up Now\" on the right side of the page.     You will be asked to enter the access code listed below, as well as some personal information. Please follow the directions to create your username and password.     Your access code is: [email or web address]  Expires: 2018  6:33 PM     Your access code will  in 90 days. If you need help or a new code, please call your [place] clinic or [phone].        Care EveryWhere ID     This is your Care EveryWhere ID. This could be used by other organizations to access your [place] medical records  [id number]        Your Vitals Were     Pulse Last Period BMI (Body Mass Index)             98 10/30/2017 25.11 kg/m2          Blood Pressure from Last 3 Encounters:   18 106/63   18 114/61   18 112/68    Weight from Last 3 Encounters:   18 72.7 kg (160 lb 4.8 oz)   18 71.9 kg (158 lb 8 oz)   18 70.1 kg (154 lb 8 oz)              We Performed the Following     [facility] Office Visit          Today's Medication Changes          These changes are accurate as of 18 11:59 PM.  If you have any questions, ask your nurse or doctor.               These medicines have changed or have updated prescriptions.        Dose/Directions    citalopram 20 MG tablet   Commonly known as:  celeXA   This may have changed:    - how much to take  - when to take this  - additional instructions   Used for:  Anxiety        Take 1/2 tablet (10 mg) for 1-2 weeks, then increase to 1 tablet orally daily   Quantity:  30 tablet   Refills:  3                Primary Care Provider Office Phone # Fax #    Zackery Bowen [phone]       [facility] [address] [facility] [address]        Equal Access to Services     JULIA KEN : Hadii rey peterson hadroejliorosaura Somikeyali, waaxda luqadaha, qaybta kacarmenda avilasheyla, waxkaylin moe sushmainocencia gramajojustin hodges dmitry willis. So [facility] [phone].    ATENCIÓN: Si habla español, tiene a echavarria disposición servicios gratuitos de asistencia lingüística. Yahaira al [phone].    We comply with applicable federal civil rights laws and [place] laws. We do not discriminate on the basis of race, color, national origin, age, disability, sex, sexual orientation, or gender identity.            Thank you!     Thank you for choosing [facility]  for your care. Our goal is always to provide you with excellent care. Hearing back from our patients is one way we can continue to improve our services. Please take a few minutes to complete the written survey that you may receive in the mail after your visit with us. Thank you!             Your Updated Medication List - Protect others around you: Learn how to safely use, store and throw away your medicines at www.disposemymeds.org.          This list is accurate as of 7/12/18 11:59 PM.  Always use your most recent med list.                   Brand Name Dispense Instructions for use Diagnosis    acetaminophen 500 MG Caps      Take 1 capsule by mouth as needed.        calcium-vitamin D-vitamin K 500-500-40 MG-UNT-MCG Chew    VIACTIV     Take 1 tablet by mouth daily.        citalopram 20 MG tablet    celeXA    30 tablet    Take 1/2 tablet (10 mg) for 1-2 weeks, then increase to 1 tablet orally daily    Anxiety       * cyclobenzaprine 10 MG tablet    FLEXERIL    42 tablet    Take 1 tablet (10 mg) by mouth 3 times daily as needed for muscle spasms    Back pain, unspecified back location, unspecified back pain laterality, unspecified chronicity       * cyclobenzaprine 5 MG tablet    FLEXERIL    20 tablet    Take 1 tablet (5 mg) by mouth nightly as needed for muscle  spasms    Bill-Danlos syndrome       gabapentin 300 MG capsule    NEURONTIN     Take 600 mg by mouth 3 times daily 1 1/2 po in P.M        oseltamivir 75 MG capsule    TAMIFLU    10 capsule    Take 1 capsule (75 mg) by mouth 2 times daily    Influenza A       oxyCODONE-acetaminophen 5-325 MG per tablet    PERCOCET    40 tablet    Take 1-2 tablets by mouth every 4 hours as needed for moderate to severe pain    S/P  section       PAXIL 10 MG tablet   Generic drug:  PARoxetine      Take 10 mg by mouth every morning        prenatal multivitamin plus iron 27-0.8 MG Tabs per tablet      Take 1 tablet by mouth daily        RITALIN LA 10 MG Cp24   Generic drug:  methylphenidate      Take 30 mg by mouth 2 tabs of 10mg in A.M and 1  10mg in P.M        sertraline 50 MG tablet    ZOLOFT     Take 50 mg by mouth        sildenafil 20 MG tablet    REVATIO    90 tablet    Take 1 tablet by mouth 3 times daily.    Vascular abnormality, PAD (peripheral artery disease) (H)       VITAMIN D3 PO      Take 1,000 Units by mouth daily.        * Notice:  This list has 2 medication(s) that are the same as other medications prescribed for you. Read the directions carefully, and ask your doctor or other care provider to review them with you.

## 2018-07-17 ENCOUNTER — OFFICE VISIT (OUTPATIENT)
Dept: MATERNAL FETAL MEDICINE | Facility: CLINIC | Age: 23
End: 2018-07-17
Attending: OBSTETRICS & GYNECOLOGY
Payer: COMMERCIAL

## 2018-07-17 ENCOUNTER — HOSPITAL ENCOUNTER (OUTPATIENT)
Dept: ULTRASOUND IMAGING | Facility: CLINIC | Age: 23
Discharge: HOME | End: 2018-07-17
Attending: OBSTETRICS & GYNECOLOGY | Admitting: OBSTETRICS & GYNECOLOGY
Payer: COMMERCIAL

## 2018-07-17 VITALS
SYSTOLIC BLOOD PRESSURE: 133 MMHG | BODY MASS INDEX: 25.34 KG/M2 | WEIGHT: 161.8 LBS | HEART RATE: 86 BPM | DIASTOLIC BLOOD PRESSURE: 69 MMHG | OXYGEN SATURATION: 99 % | RESPIRATION RATE: 18 BRPM

## 2018-07-17 DIAGNOSIS — O09.93 SUPERVISION OF HIGH RISK PREGNANCY IN THIRD TRIMESTER: ICD-10-CM

## 2018-07-17 DIAGNOSIS — Q79.60 EHLERS-DANLOS SYNDROME: Primary | ICD-10-CM

## 2018-07-17 DIAGNOSIS — O34.219 HISTORY OF CESAREAN DELIVERY, CURRENTLY PREGNANT: ICD-10-CM

## 2018-07-17 PROCEDURE — G0463 HOSPITAL OUTPT CLINIC VISIT: HCPCS | Mod: 25,ZF

## 2018-07-17 PROCEDURE — 76816 OB US FOLLOW-UP PER FETUS: CPT

## 2018-07-17 NOTE — PROGRESS NOTES
Maternal & Fetal Medicine    Anjelica Holloway  : 1995  MRN: [id number]    CC: OB Follow-up    Subjective:  Anjelica Holloway is a 23 year old  at 37w1d presenting for routine OB follow-up. Today, she is feeling well, ready for delivery.  She denies regular, painful contractions, denies loss of fluid or vaginal bleeding.  Endorses fetal movement.  Decreased Celexa last week, feels well with the dose change.      OB Hx:  Obstetric History       T1      L1     SAB0   TAB0   Ectopic0   Multiple0   Live Births1       # Outcome Date GA Lbr Lewis/2nd Weight Sex Delivery Anes PTL Lv   2 Current            1 Term 05/20/15 38w0d  3.204 kg (7 lb 1 oz) F CS-LTranv  N KEYON      Apgar1:  9                Apgar5: 9        Problem List:  Patient Active Problem List   Diagnosis     PAD (peripheral artery disease) (H)     History of Meckel's diverticulum     S/P cholecystectomy     Attention deficit disorder of childhood     Corpus albicans cyst of ovary     Ehler's-Danlos syndrome     Vascular abnormality     Other current maternal conditions classifiable elsewhere, antepartum     S/P  section     Objective:  /69 (BP Location: Left arm, Patient Position: Sitting, Cuff Size: Adult Regular)  Pulse 86  Resp 18  Wt 73.4 kg (161 lb 12.8 oz)  LMP 10/30/2017  SpO2 99%  BMI 25.34 kg/m2    Gen: NAD, well appearing  Chest: non-labored breathing  Abdominal: gravid  Neuro: moving all extremities equally  Doptones: per US    OB Ultrasounds:  Date  GA  Comments  18  37w1d  Normal growth; 7lb 13oz; EFW 81%ile, 3532g  18  33w1d  Normal growth (5lb 2oz)  18  29w3d  Normal growth  18  25w3d  Normal growth  3/29/18  21w3d  Normal growth, cardiac views appeared normal  3/8/18  18w3d  Normal anatomy, cardiac views limited  18  14w3d  Normal growth    Assessment/Plan:  23 year old GONZÁLEZ at 37w1d by LMP c/w 7w1d US with 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 Follows with Dr. Gutierrez. See genetic counseling notes from previous pregnancy or consultation for further details.      #Vascular spasms/peripheral vascular disease  -Followed by cardiology (Dr. Gutierrez), last seen on 17, next visit 2018  -On Sildenafil BID for symptomatic relief  -Increased blood flow during pregnancy may actually improve her symptoms and small vessel disease.  -Aggressive preventive measures will continue, such as warm socks at all times and avoiding prolonged cold exposures.   #Anxiety  Did not tolerate zoloft which she tried for a week. On Celexa 5mg/day and tolerating well.  Previously reviewed that Celexa has been associated with risk of fetal primary pulmonary hypertension and withdrawal symptoms if taken closer to time of delivery. Began wean last week, reports mood is stable.  - On paxil prior to pregnancy, planning to restart postpartum      #Parvovirus exposure  -Testing shows immune to Parvovirus      #Sciatica, left side  No complaints today.  Previously had discussed maternity support belt and PT as options. Previously declined PT and has not requested referral since.       #Routine PNC  - NOB Labs reviewed: Rh +, ANNIE neg, Hep B SAg NR, HIV NR, RPR neg, GC/Chlam neg, , s/p TDap   - Rubella equivocal, will offer MMR postpartum  - Weight gain: BMI 18 prepregnancy, now 25, +40lbs to date, 25-30lb weight gain goal  - FLNA gene mutation:  genetic evaluation of baby for possible otopalatodigital spectrum disorders after birth  - Anatomy scan: 3/8/18, normal  - Genetic screening: Quad screen neg  -  testing: Q4  week growth, had last growth scan today  - S/p anesthesia consult  - GBS negative       #Delivery planning:  - Plans for repeat  section at 38 weeks, scheduled for 18 with Dr. Smith  - Feeding: breastfeeding  - Contraception plans: still undecided considering Mirena or Paragard - reports previously being told she shouldn't be on OCPs due to her EDS.  Discussed that her primary OBGYN should be able to place this at her 6w visit.   -  labor precautions given    Patient seen and discussed with Dr. Sundeep Morin MD  OBGYN PGY2  2018 1:20 PM      Physician Attestation   IRosalio, saw and evaluated Anjelica Holloway with the resident.      I have reviewed and discussed with Dr. Morin the patient history, physical exam and plan of care. I personally reviewed the vital signs, medications, lab results and imaging.    Key history or physical exam findings: EDS and history of CD    Almazan management decisions made: plan for RCD in 1 week.    Rosalio Urbano  Date of Service (when I saw the patient): 18    Time Spent on this Encounter   I, Rosalio Urbano, spent a total of 10 minutes in face to face consultation today managing the care of Anjelica Holloway.  Over 50% of my time on the unit was spent counseling the patient and /or coordinating care regarding RCD. See note for details.

## 2018-07-17 NOTE — NURSING NOTE
Anjelica seen in clinic today with mother and daughter for growth US and OBV for known EDS at 37w1d gestation (see report/notes). VSS. Pt denies bldg/lof/change in discharge/contractions/headache/vision changes/chest pain/SOB/edema. C/S scheduled for Monday, 7/23/18 at 1030. Info sheet given and discussed soap application and arriving to hospital at 0830. Map/parking information given to patient. Patient is currently enrolled in [facility] and her [facility] nurse checked her hemoglobin yesterday, which was 10.4 and the nurse asked her to inform her doctor at today's appointment. Patients last hgb in May was 10.9. Patient was advised to cut back on Celexa dose near delivery time. She is currently taking 5mg twice a week and states she is coping well. Dr. Urbano and Dr. Morin met with patient and encouraged taking over the counter iron prior to her C/S. She currently takes a gummy prenatal vitamin. Pt discharged stable and ambulatory.

## 2018-07-17 NOTE — MR AVS SNAPSHOT
"              After Visit Summary   2018    Anjelica Holloway    MRN: [id number]           Patient Information     Date Of Birth          1995        Visit Information        Provider Department      2018 2:15 PM Rosalio Urbano MD [facility] Maternal Fetal Medicine [facility]        Today's Diagnoses     Ehler's-Danlos syndrome    -  1    Supervision of high risk pregnancy in third trimester        History of  delivery, currently pregnant           Follow-ups after your visit        Your next 10 appointments already scheduled     2018   Procedure with Jayne Smith MD   UR 4COB (--)    [address] [facility] [address] [phone]              Who to contact     If you have questions or need follow up information about today's clinic visit or your schedule please contact [facility] MATERNAL FETAL MEDICINE [facility] directly at [phone].  Normal or non-critical lab and imaging results will be communicated to you by MyChart, letter or phone within 4 business days after the clinic has received the results. If you do not hear from us within 7 days, please contact the clinic through [employer] or phone. If you have a critical or abnormal lab result, we will notify you by phone as soon as possible.  Submit refill requests through [employer] or call your pharmacy and they will forward the refill request to us. Please allow 3 business days for your refill to be completed.          Additional Information About Your Visit        MyChart Information     [employer] lets you send messages to your doctor, view your test results, renew your prescriptions, schedule appointments and more. To sign up, go to [email or web address] . Click on \"Log in\" on the left side of the screen, which will take you to the Welcome page. Then click on \"Sign up Now\" on the right side of the page.     You will be asked to enter the access code listed below, as well as some personal information. Please " follow the directions to create your username and password.     Your access code is: [email or web address]  Expires: 2018  6:33 PM     Your access code will  in 90 days. If you need help or a new code, please call your [place] clinic or [phone].        Care EveryWhere ID     This is your Care EveryWhere ID. This could be used by other organizations to access your [place] medical records  [id number]        Your Vitals Were     Pulse Respirations Last Period Pulse Oximetry BMI (Body Mass Index)       86 18 10/30/2017 99% 25.34 kg/m2        Blood Pressure from Last 3 Encounters:   18 133/69   18 106/63   18 114/61    Weight from Last 3 Encounters:   18 73.4 kg (161 lb 12.8 oz)   18 72.7 kg (160 lb 4.8 oz)   18 71.9 kg (158 lb 8 oz)              We Performed the Following     [facility] Office Visit          Today's Medication Changes          These changes are accurate as of 18 11:59 PM.  If you have any questions, ask your nurse or doctor.               These medicines have changed or have updated prescriptions.        Dose/Directions    citalopram 20 MG tablet   Commonly known as:  celeXA   This may have changed:    - how much to take  - when to take this  - additional instructions   Used for:  Anxiety        Take 1/2 tablet (10 mg) for 1-2 weeks, then increase to 1 tablet orally daily   Quantity:  30 tablet   Refills:  3                Primary Care Provider Office Phone # Fax #    Zackery Bowen [phone]       [facility]  [address] [facility] [address]        Equal Access to Services     TERE ROGERS AH: Hadii rey foreman Somelissa, waaxda luqadaha, qaybta kaalmada gary, sivan willis. So [facility] [phone].    ATENCIÓN: Si habla español, tiene a echavarria disposición servicios gratuitos de asistencia lingüística. Llame al [phone].    We comply with applicable federal civil rights laws and [place] laws.  We do not discriminate on the basis of race, color, national origin, age, disability, sex, sexual orientation, or gender identity.            Thank you!     Thank you for choosing [facility]  for your care. Our goal is always to provide you with excellent care. Hearing back from our patients is one way we can continue to improve our services. Please take a few minutes to complete the written survey that you may receive in the mail after your visit with us. Thank you!             Your Updated Medication List - Protect others around you: Learn how to safely use, store and throw away your medicines at www.disposemymeds.org.          This list is accurate as of 18 11:59 PM.  Always use your most recent med list.                   Brand Name Dispense Instructions for use Diagnosis    acetaminophen 500 MG Caps      Take 1 capsule by mouth as needed.        calcium-vitamin D-vitamin K 500-500-40 MG-UNT-MCG Chew    VIACTIV     Take 1 tablet by mouth daily.        citalopram 20 MG tablet    celeXA    30 tablet    Take 1/2 tablet (10 mg) for 1-2 weeks, then increase to 1 tablet orally daily    Anxiety       * cyclobenzaprine 10 MG tablet    FLEXERIL    42 tablet    Take 1 tablet (10 mg) by mouth 3 times daily as needed for muscle spasms    Back pain, unspecified back location, unspecified back pain laterality, unspecified chronicity       * cyclobenzaprine 5 MG tablet    FLEXERIL    20 tablet    Take 1 tablet (5 mg) by mouth nightly as needed for muscle spasms    Bill-Danlos syndrome       gabapentin 300 MG capsule    NEURONTIN     Take 600 mg by mouth 3 times daily 1 1/2 po in P.M        oseltamivir 75 MG capsule    TAMIFLU    10 capsule    Take 1 capsule (75 mg) by mouth 2 times daily    Influenza A       oxyCODONE-acetaminophen 5-325 MG per tablet    PERCOCET    40 tablet    Take 1-2 tablets by mouth every 4 hours as needed for moderate to severe pain    S/P  section        PAXIL 10 MG tablet   Generic drug:  PARoxetine      Take 10 mg by mouth every morning        prenatal multivitamin plus iron 27-0.8 MG Tabs per tablet      Take 1 tablet by mouth daily        RITALIN LA 10 MG Cp24   Generic drug:  methylphenidate      Take 30 mg by mouth 2 tabs of 10mg in A.M and 1  10mg in P.M        sertraline 50 MG tablet    ZOLOFT     Take 50 mg by mouth        sildenafil 20 MG tablet    REVATIO    90 tablet    Take 1 tablet by mouth 3 times daily.    Vascular abnormality, PAD (peripheral artery disease) (H)       VITAMIN D3 PO      Take 1,000 Units by mouth daily.        * Notice:  This list has 2 medication(s) that are the same as other medications prescribed for you. Read the directions carefully, and ask your doctor or other care provider to review them with you.

## 2018-07-22 ENCOUNTER — ANESTHESIA EVENT (OUTPATIENT)
Dept: OBGYN | Facility: CLINIC | Age: 23
End: 2018-07-22
Payer: COMMERCIAL

## 2018-07-22 ASSESSMENT — LIFESTYLE VARIABLES: TOBACCO_USE: 1

## 2018-07-22 NOTE — ANESTHESIA PREPROCEDURE EVALUATION
Anesthesia Evaluation     . Pt has had prior anesthetic. Type: Regional and General    No history of anesthetic complications          ROS/MED HX    ENT/Pulmonary:     (+)tobacco use, , . .    Neurologic:       Cardiovascular: Comment: POTS Syndrome        METS/Exercise Tolerance:  >4 METS   Hematologic: Comments: Peripheral Artery Disease, Congenital Vascular Abnormalities (anomaly of blood flow to lower extremities) - patient taking silfenafil 20mg QD        Musculoskeletal: Comment: EDS with joint hypermobility        GI/Hepatic: Comment: Meckel's diverticulum    (+) GERD (Peptic Ulcer Disease) Asymptomatic on medication,       Renal/Genitourinary:  - ROS Renal section negative       Endo:  - neg endo ROS       Psychiatric: Comment: ADHD    (+) psychiatric history other (comment) and anxiety      Infectious Disease:  - neg infectious disease ROS       Malignancy:      - no malignancy   Other: Comment: 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, vascular malformation   (+) Possibly pregnant no H/O Chronic Pain,  - neg other ROS               Procedure: Procedure(s):  Repeat  Section  - Wound Class:     HPI: Anjelica Holloway is a 23 year old female with the following history who presents for repeat c section.    PMHx/PSHx:  Past Medical History:   Diagnosis Date     ADHD (attention deficit hyperactivity disorder)      Ehler's-Danlos syndrome 2013     Problem list name updated by automated process. Provider to review and confirm     Headaches      Joint hyperextensibility of multiple sites     suspect Ehler-Danlos but genetics testing not conclusive      Meckel's diverticulum     rupture of Meckel's diverticulum s/p bowel resection, approx 4 inches removed     PAD (peripheral artery disease) (H) 2012    Non-atherosclerotic PAD due to unknown arterial occlusive disease, presumed Bill-Danlos syndrome, with superimposed vasospasm      Peptic ulcer disease     secondary to prolonged  NSAIDs use for headache     POTS (postural orthostatic tachycardia syndrome)      Vascular abnormality 2013     Vascular anomalies, 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, and hypoplastic right posterior tibular arteries       Past Surgical History:   Procedure Laterality Date     ------------OTHER-------------  rupture Meckel diverticulum with removal of necroic bowel    Removal of      APPENDECTOMY        SECTION N/A 2015    Procedure:  SECTION;  Surgeon: Zulma Chavez MD;  Location: [facility] L[facility]     CHOLECYSTECTOMY           Current Facility-Administered Medications on File Prior to Encounter:  bupivacaine HCl 0.25 % preservative free injection SOLN     Current Outpatient Prescriptions on File Prior to Encounter:  acetaminophen 500 MG CAPS Take 1 capsule by mouth as needed.   calcium-vitamin D-vitamin K (VIACTIV) 500-500-40 MG-UNT-MCG CHEW Take 1 tablet by mouth daily.   Cholecalciferol (VITAMIN D3 PO) Take 1,000 Units by mouth daily.   citalopram (CELEXA) 20 MG tablet Take 1/2 tablet (10 mg) for 1-2 weeks, then increase to 1 tablet orally daily (Patient taking differently: 5 mg daily Take 1/2 tablet (10 mg) for 1-2 weeks, then increase to 1 tablet orally daily)   cyclobenzaprine (FLEXERIL) 10 MG tablet Take 1 tablet (10 mg) by mouth 3 times daily as needed for muscle spasms   cyclobenzaprine (FLEXERIL) 5 MG tablet Take 1 tablet (5 mg) by mouth nightly as needed for muscle spasms (Patient not taking: Reported on 2018)   gabapentin (NEURONTIN) 300 MG capsule Take 600 mg by mouth 3 times daily 1 1/2 po in P.M   methylphenidate (RITALIN LA) 10 MG CP24 Take 30 mg by mouth 2 tabs of 10mg in A.M and 1  10mg in P.M   oseltamivir (TAMIFLU) 75 MG capsule Take 1 capsule (75 mg) by mouth 2 times daily (Patient not taking: Reported on 2018)   oxyCODONE-acetaminophen (PERCOCET) 5-325 MG per tablet Take 1-2 tablets by mouth every 4  hours as needed for moderate to severe pain (Patient not taking: Reported on 12/19/2017)   PARoxetine (PAXIL) 10 MG tablet Take 10 mg by mouth every morning   Prenatal Vit-Fe Fumarate-FA (PRENATAL MULTIVITAMIN PLUS IRON) 27-0.8 MG TABS per tablet Take 1 tablet by mouth daily   sertraline (ZOLOFT) 50 MG tablet Take 50 mg by mouth   sildenafil (REVATIO/VIAGRA) 20 MG tablet Take 1 tablet by mouth 3 times daily.       Social Hx:   Social History   Substance Use Topics     Smoking status: Current Some Day Smoker     Packs/day: 0.50     Types: Cigarettes     Smokeless tobacco: Never Used     Alcohol use Yes      Comment: once/month       Allergies:   Allergies   Allergen Reactions     Nsaids GI Disturbance         NPO Status: Per ASA Guidelines    Labs:    Blood Bank:  Lab Results   Component Value Date    ABO A 03/08/2018    RH Pos 03/08/2018    AS Neg 03/08/2018     BMP:  Recent Labs   Lab Test  02/02/13   0504   NA  138   POTASSIUM  4.0   CHLORIDE  107   CO2  23   BUN  17   CR  0.64   GLC  85   ERICA  8.7     CBC:   Recent Labs   Lab Test  05/24/18   1447   WBC  10.9   RBC  3.54*   HGB  10.9*   HCT  33.2*   MCV  94   MCH  30.8   MCHC  32.8   RDW  12.3   PLT  267     Coags:  No results for input(s): INR, PTT, FIBR in the last 61441 hours.          Physical Exam  Normal systems: cardiovascular, pulmonary and dental    Airway   Mallampati: II  TM distance: >3 FB  Neck ROM: full    Dental     Cardiovascular   Rhythm and rate: regular and normal      Pulmonary    breath sounds clear to auscultation                    Anesthesia Plan      History & Physical Review  History and physical reviewed and following examination; no interval change.    ASA Status:  3 .        Plan for Spinal and Periph. Nerve Block for postop pain   PONV prophylaxis:  Ondansetron (or other 5HT-3)       Postoperative Care  Postoperative pain management:  Multi-modal analgesia.      Consents  Anesthetic plan, risks, benefits and alternatives discussed  with:  Patient.  Use of blood products discussed: Yes.   Use of blood products discussed with Patient.  Consented to blood products.  .

## 2018-07-23 ENCOUNTER — HOSPITAL ENCOUNTER (INPATIENT)
Facility: CLINIC | Age: 23
LOS: 3 days | Discharge: HOME | End: 2018-07-26
Attending: OBSTETRICS & GYNECOLOGY | Admitting: OBSTETRICS & GYNECOLOGY
Payer: COMMERCIAL

## 2018-07-23 ENCOUNTER — ANESTHESIA (OUTPATIENT)
Dept: OBGYN | Facility: CLINIC | Age: 23
End: 2018-07-23
Payer: COMMERCIAL

## 2018-07-23 ENCOUNTER — SURGERY (OUTPATIENT)
Age: 23
End: 2018-07-23

## 2018-07-23 DIAGNOSIS — D50.8 OTHER IRON DEFICIENCY ANEMIA: ICD-10-CM

## 2018-07-23 DIAGNOSIS — Z98.891 S/P CESAREAN SECTION: Primary | ICD-10-CM

## 2018-07-23 DIAGNOSIS — F41.1 GENERALIZED ANXIETY DISORDER: ICD-10-CM

## 2018-07-23 LAB
ABO + RH BLD: NORMAL
ABO + RH BLD: NORMAL
BASOPHILS # BLD AUTO: 0 10E9/L (ref 0–0.2)
BASOPHILS NFR BLD AUTO: 0.1 %
BLD GP AB SCN SERPL QL: NORMAL
BLOOD BANK CMNT PATIENT-IMP: NORMAL
DIFFERENTIAL METHOD BLD: ABNORMAL
EOSINOPHIL # BLD AUTO: 0.1 10E9/L (ref 0–0.7)
EOSINOPHIL NFR BLD AUTO: 0.5 %
ERYTHROCYTE [DISTWIDTH] IN BLOOD BY AUTOMATED COUNT: 13.1 % (ref 10–15)
HCT VFR BLD AUTO: 30.5 % (ref 35–47)
HGB BLD-MCNC: 9.8 G/DL (ref 11.7–15.7)
IMM GRANULOCYTES # BLD: 0.1 10E9/L (ref 0–0.4)
IMM GRANULOCYTES NFR BLD: 0.7 %
LYMPHOCYTES # BLD AUTO: 1.9 10E9/L (ref 0.8–5.3)
LYMPHOCYTES NFR BLD AUTO: 15.7 %
MCH RBC QN AUTO: 29.1 PG (ref 26.5–33)
MCHC RBC AUTO-ENTMCNC: 32.1 G/DL (ref 31.5–36.5)
MCV RBC AUTO: 91 FL (ref 78–100)
MONOCYTES # BLD AUTO: 0.8 10E9/L (ref 0–1.3)
MONOCYTES NFR BLD AUTO: 6.5 %
NEUTROPHILS # BLD AUTO: 9.1 10E9/L (ref 1.6–8.3)
NEUTROPHILS NFR BLD AUTO: 76.5 %
NRBC # BLD AUTO: 0 10*3/UL
NRBC BLD AUTO-RTO: 0 /100
PLATELET # BLD AUTO: 228 10E9/L (ref 150–450)
RBC # BLD AUTO: 3.37 10E12/L (ref 3.8–5.2)
SPECIMEN EXP DATE BLD: NORMAL
T PALLIDUM AB SER QL: NONREACTIVE
WBC # BLD AUTO: 11.9 10E9/L (ref 4–11)

## 2018-07-23 PROCEDURE — 36000057 ZZH SURGERY LEVEL 3 1ST 30 MIN - UMMC: Performed by: OBSTETRICS & GYNECOLOGY

## 2018-07-23 PROCEDURE — 25000128 H RX IP 250 OP 636: Performed by: ANESTHESIOLOGY

## 2018-07-23 PROCEDURE — 25000128 H RX IP 250 OP 636

## 2018-07-23 PROCEDURE — 37000008 ZZH ANESTHESIA TECHNICAL FEE, 1ST 30 MIN: Performed by: OBSTETRICS & GYNECOLOGY

## 2018-07-23 PROCEDURE — 12000032 ZZH R&B OB CRITICAL UMMC

## 2018-07-23 PROCEDURE — C1765 ADHESION BARRIER: HCPCS | Performed by: OBSTETRICS & GYNECOLOGY

## 2018-07-23 PROCEDURE — 86901 BLOOD TYPING SEROLOGIC RH(D): CPT | Performed by: STUDENT IN AN ORGANIZED HEALTH CARE EDUCATION/TRAINING PROGRAM

## 2018-07-23 PROCEDURE — 27210794 ZZH OR GENERAL SUPPLY STERILE: Performed by: OBSTETRICS & GYNECOLOGY

## 2018-07-23 PROCEDURE — 25000125 ZZHC RX 250: Performed by: STUDENT IN AN ORGANIZED HEALTH CARE EDUCATION/TRAINING PROGRAM

## 2018-07-23 PROCEDURE — 71000014 ZZH RECOVERY PHASE 1 LEVEL 2 FIRST HR: Performed by: OBSTETRICS & GYNECOLOGY

## 2018-07-23 PROCEDURE — 25000128 H RX IP 250 OP 636: Performed by: STUDENT IN AN ORGANIZED HEALTH CARE EDUCATION/TRAINING PROGRAM

## 2018-07-23 PROCEDURE — 86850 RBC ANTIBODY SCREEN: CPT | Performed by: STUDENT IN AN ORGANIZED HEALTH CARE EDUCATION/TRAINING PROGRAM

## 2018-07-23 PROCEDURE — 86780 TREPONEMA PALLIDUM: CPT | Performed by: STUDENT IN AN ORGANIZED HEALTH CARE EDUCATION/TRAINING PROGRAM

## 2018-07-23 PROCEDURE — 36415 COLL VENOUS BLD VENIPUNCTURE: CPT | Performed by: STUDENT IN AN ORGANIZED HEALTH CARE EDUCATION/TRAINING PROGRAM

## 2018-07-23 PROCEDURE — 0UN90ZZ RELEASE UTERUS, OPEN APPROACH: ICD-10-PCS | Performed by: OBSTETRICS & GYNECOLOGY

## 2018-07-23 PROCEDURE — 40000170 ZZH STATISTIC PRE-PROCEDURE ASSESSMENT II: Performed by: OBSTETRICS & GYNECOLOGY

## 2018-07-23 PROCEDURE — 86900 BLOOD TYPING SEROLOGIC ABO: CPT | Performed by: STUDENT IN AN ORGANIZED HEALTH CARE EDUCATION/TRAINING PROGRAM

## 2018-07-23 PROCEDURE — 25000132 ZZH RX MED GY IP 250 OP 250 PS 637

## 2018-07-23 PROCEDURE — 85025 COMPLETE CBC W/AUTO DIFF WBC: CPT | Performed by: STUDENT IN AN ORGANIZED HEALTH CARE EDUCATION/TRAINING PROGRAM

## 2018-07-23 PROCEDURE — 25000132 ZZH RX MED GY IP 250 OP 250 PS 637: Performed by: STUDENT IN AN ORGANIZED HEALTH CARE EDUCATION/TRAINING PROGRAM

## 2018-07-23 PROCEDURE — 36000059 ZZH SURGERY LEVEL 3 EA 15 ADDTL MIN UMMC: Performed by: OBSTETRICS & GYNECOLOGY

## 2018-07-23 PROCEDURE — 37000009 ZZH ANESTHESIA TECHNICAL FEE, EACH ADDTL 15 MIN: Performed by: OBSTETRICS & GYNECOLOGY

## 2018-07-23 PROCEDURE — 71000015 ZZH RECOVERY PHASE 1 LEVEL 2 EA ADDTL HR: Performed by: OBSTETRICS & GYNECOLOGY

## 2018-07-23 PROCEDURE — C9290 INJ, BUPIVACAINE LIPOSOME: HCPCS | Performed by: STUDENT IN AN ORGANIZED HEALTH CARE EDUCATION/TRAINING PROGRAM

## 2018-07-23 RX ORDER — FENTANYL CITRATE 50 UG/ML
25-50 INJECTION, SOLUTION INTRAMUSCULAR; INTRAVENOUS
Status: DISCONTINUED | OUTPATIENT
Start: 2018-07-23 | End: 2018-07-26 | Stop reason: HOSPADM

## 2018-07-23 RX ORDER — HYDROMORPHONE HYDROCHLORIDE 1 MG/ML
.3-.5 INJECTION, SOLUTION INTRAMUSCULAR; INTRAVENOUS; SUBCUTANEOUS EVERY 5 MIN PRN
Status: DISCONTINUED | OUTPATIENT
Start: 2018-07-23 | End: 2018-07-23 | Stop reason: HOSPADM

## 2018-07-23 RX ORDER — NALOXONE HYDROCHLORIDE 0.4 MG/ML
.1-.4 INJECTION, SOLUTION INTRAMUSCULAR; INTRAVENOUS; SUBCUTANEOUS
Status: DISCONTINUED | OUTPATIENT
Start: 2018-07-23 | End: 2018-07-23

## 2018-07-23 RX ORDER — SODIUM CHLORIDE, SODIUM LACTATE, POTASSIUM CHLORIDE, CALCIUM CHLORIDE 600; 310; 30; 20 MG/100ML; MG/100ML; MG/100ML; MG/100ML
INJECTION, SOLUTION INTRAVENOUS
Status: COMPLETED
Start: 2018-07-23 | End: 2018-07-23

## 2018-07-23 RX ORDER — LANOLIN 100 %
OINTMENT (GRAM) TOPICAL
Status: DISCONTINUED | OUTPATIENT
Start: 2018-07-23 | End: 2018-07-26 | Stop reason: HOSPADM

## 2018-07-23 RX ORDER — CEFAZOLIN SODIUM 2 G/100ML
2 INJECTION, SOLUTION INTRAVENOUS
Status: COMPLETED | OUTPATIENT
Start: 2018-07-23 | End: 2018-07-23

## 2018-07-23 RX ORDER — LIDOCAINE 40 MG/G
CREAM TOPICAL
Status: DISCONTINUED | OUTPATIENT
Start: 2018-07-23 | End: 2018-07-26 | Stop reason: HOSPADM

## 2018-07-23 RX ORDER — SIMETHICONE 80 MG
80 TABLET,CHEWABLE ORAL 4 TIMES DAILY PRN
Status: DISCONTINUED | OUTPATIENT
Start: 2018-07-23 | End: 2018-07-26 | Stop reason: HOSPADM

## 2018-07-23 RX ORDER — SODIUM CHLORIDE, SODIUM LACTATE, POTASSIUM CHLORIDE, CALCIUM CHLORIDE 600; 310; 30; 20 MG/100ML; MG/100ML; MG/100ML; MG/100ML
INJECTION, SOLUTION INTRAVENOUS CONTINUOUS PRN
Status: DISCONTINUED | OUTPATIENT
Start: 2018-07-23 | End: 2018-07-23

## 2018-07-23 RX ORDER — MORPHINE SULFATE 1 MG/ML
INJECTION, SOLUTION EPIDURAL; INTRATHECAL; INTRAVENOUS PRN
Status: DISCONTINUED | OUTPATIENT
Start: 2018-07-23 | End: 2018-07-23

## 2018-07-23 RX ORDER — ACETAMINOPHEN 325 MG/1
975 TABLET ORAL EVERY 8 HOURS
Status: DISCONTINUED | OUTPATIENT
Start: 2018-07-23 | End: 2018-07-25

## 2018-07-23 RX ORDER — ONDANSETRON 4 MG/1
4 TABLET, ORALLY DISINTEGRATING ORAL EVERY 30 MIN PRN
Status: DISCONTINUED | OUTPATIENT
Start: 2018-07-23 | End: 2018-07-26 | Stop reason: HOSPADM

## 2018-07-23 RX ORDER — OXYTOCIN/0.9 % SODIUM CHLORIDE 30/500 ML
340 PLASTIC BAG, INJECTION (ML) INTRAVENOUS CONTINUOUS PRN
Status: DISCONTINUED | OUTPATIENT
Start: 2018-07-23 | End: 2018-07-26 | Stop reason: HOSPADM

## 2018-07-23 RX ORDER — SODIUM CHLORIDE, SODIUM LACTATE, POTASSIUM CHLORIDE, CALCIUM CHLORIDE 600; 310; 30; 20 MG/100ML; MG/100ML; MG/100ML; MG/100ML
INJECTION, SOLUTION INTRAVENOUS CONTINUOUS
Status: DISCONTINUED | OUTPATIENT
Start: 2018-07-23 | End: 2018-07-23 | Stop reason: HOSPADM

## 2018-07-23 RX ORDER — ACETAMINOPHEN 325 MG/1
650 TABLET ORAL EVERY 4 HOURS PRN
Status: DISCONTINUED | OUTPATIENT
Start: 2018-07-26 | End: 2018-07-25

## 2018-07-23 RX ORDER — IBUPROFEN 200 MG
600 TABLET ORAL EVERY 6 HOURS PRN
Status: CANCELLED | OUTPATIENT
Start: 2018-07-23

## 2018-07-23 RX ORDER — OXYTOCIN/0.9 % SODIUM CHLORIDE 30/500 ML
100 PLASTIC BAG, INJECTION (ML) INTRAVENOUS CONTINUOUS
Status: DISCONTINUED | OUTPATIENT
Start: 2018-07-23 | End: 2018-07-26 | Stop reason: HOSPADM

## 2018-07-23 RX ORDER — OXYTOCIN/0.9 % SODIUM CHLORIDE 30/500 ML
PLASTIC BAG, INJECTION (ML) INTRAVENOUS CONTINUOUS PRN
Status: DISCONTINUED | OUTPATIENT
Start: 2018-07-23 | End: 2018-07-23

## 2018-07-23 RX ORDER — ONDANSETRON 2 MG/ML
INJECTION INTRAMUSCULAR; INTRAVENOUS PRN
Status: DISCONTINUED | OUTPATIENT
Start: 2018-07-23 | End: 2018-07-23

## 2018-07-23 RX ORDER — BUPIVACAINE HYDROCHLORIDE AND EPINEPHRINE 2.5; 5 MG/ML; UG/ML
INJECTION, SOLUTION INFILTRATION; PERINEURAL PRN
Status: DISCONTINUED | OUTPATIENT
Start: 2018-07-23 | End: 2018-07-23

## 2018-07-23 RX ORDER — NALOXONE HYDROCHLORIDE 0.4 MG/ML
.1-.4 INJECTION, SOLUTION INTRAMUSCULAR; INTRAVENOUS; SUBCUTANEOUS
Status: DISCONTINUED | OUTPATIENT
Start: 2018-07-23 | End: 2018-07-26 | Stop reason: HOSPADM

## 2018-07-23 RX ORDER — BUPIVACAINE HYDROCHLORIDE 7.5 MG/ML
INJECTION, SOLUTION EPIDURAL; RETROBULBAR PRN
Status: DISCONTINUED | OUTPATIENT
Start: 2018-07-23 | End: 2018-07-23

## 2018-07-23 RX ORDER — AMOXICILLIN 250 MG
1 CAPSULE ORAL 2 TIMES DAILY PRN
Status: DISCONTINUED | OUTPATIENT
Start: 2018-07-23 | End: 2018-07-26 | Stop reason: HOSPADM

## 2018-07-23 RX ORDER — IBUPROFEN 800 MG/1
800 TABLET, FILM COATED ORAL EVERY 6 HOURS PRN
Status: CANCELLED | OUTPATIENT
Start: 2018-07-23

## 2018-07-23 RX ORDER — FENTANYL CITRATE 50 UG/ML
INJECTION, SOLUTION INTRAMUSCULAR; INTRAVENOUS PRN
Status: DISCONTINUED | OUTPATIENT
Start: 2018-07-23 | End: 2018-07-23

## 2018-07-23 RX ORDER — DEXTROSE, SODIUM CHLORIDE, SODIUM LACTATE, POTASSIUM CHLORIDE, AND CALCIUM CHLORIDE 5; .6; .31; .03; .02 G/100ML; G/100ML; G/100ML; G/100ML; G/100ML
INJECTION, SOLUTION INTRAVENOUS CONTINUOUS
Status: DISCONTINUED | OUTPATIENT
Start: 2018-07-23 | End: 2018-07-26 | Stop reason: HOSPADM

## 2018-07-23 RX ORDER — MORPHINE SULFATE 1 MG/ML
INJECTION, SOLUTION EPIDURAL; INTRATHECAL; INTRAVENOUS
Status: DISCONTINUED
Start: 2018-07-23 | End: 2018-07-23 | Stop reason: HOSPADM

## 2018-07-23 RX ORDER — HYDROCORTISONE 2.5 %
CREAM (GRAM) TOPICAL 3 TIMES DAILY PRN
Status: DISCONTINUED | OUTPATIENT
Start: 2018-07-23 | End: 2018-07-26 | Stop reason: HOSPADM

## 2018-07-23 RX ORDER — SILDENAFIL CITRATE 20 MG/1
20 TABLET ORAL 3 TIMES DAILY
Status: DISCONTINUED | OUTPATIENT
Start: 2018-07-23 | End: 2018-07-23

## 2018-07-23 RX ORDER — CARBOPROST TROMETHAMINE 250 UG/ML
250 INJECTION, SOLUTION INTRAMUSCULAR
Status: DISCONTINUED | OUTPATIENT
Start: 2018-07-23 | End: 2018-07-26 | Stop reason: HOSPADM

## 2018-07-23 RX ORDER — BISACODYL 10 MG
10 SUPPOSITORY, RECTAL RECTAL DAILY PRN
Status: DISCONTINUED | OUTPATIENT
Start: 2018-07-25 | End: 2018-07-26 | Stop reason: HOSPADM

## 2018-07-23 RX ORDER — AMOXICILLIN 250 MG
2 CAPSULE ORAL 2 TIMES DAILY PRN
Status: DISCONTINUED | OUTPATIENT
Start: 2018-07-23 | End: 2018-07-26 | Stop reason: HOSPADM

## 2018-07-23 RX ORDER — SILDENAFIL CITRATE 20 MG/1
20 TABLET ORAL 2 TIMES DAILY
Status: DISCONTINUED | OUTPATIENT
Start: 2018-07-24 | End: 2018-07-26 | Stop reason: HOSPADM

## 2018-07-23 RX ORDER — ONDANSETRON 2 MG/ML
4 INJECTION INTRAMUSCULAR; INTRAVENOUS EVERY 30 MIN PRN
Status: DISCONTINUED | OUTPATIENT
Start: 2018-07-23 | End: 2018-07-26 | Stop reason: HOSPADM

## 2018-07-23 RX ORDER — IBUPROFEN 200 MG
400 TABLET ORAL EVERY 6 HOURS PRN
Status: CANCELLED | OUTPATIENT
Start: 2018-07-23

## 2018-07-23 RX ORDER — CITRIC ACID/SODIUM CITRATE 334-500MG
SOLUTION, ORAL ORAL
Status: COMPLETED
Start: 2018-07-23 | End: 2018-07-23

## 2018-07-23 RX ORDER — ONDANSETRON 4 MG/1
4 TABLET, ORALLY DISINTEGRATING ORAL EVERY 30 MIN PRN
Status: DISCONTINUED | OUTPATIENT
Start: 2018-07-23 | End: 2018-07-23 | Stop reason: HOSPADM

## 2018-07-23 RX ORDER — CITRIC ACID/SODIUM CITRATE 334-500MG
30 SOLUTION, ORAL ORAL
Status: COMPLETED | OUTPATIENT
Start: 2018-07-23 | End: 2018-07-23

## 2018-07-23 RX ORDER — SODIUM CHLORIDE, SODIUM LACTATE, POTASSIUM CHLORIDE, CALCIUM CHLORIDE 600; 310; 30; 20 MG/100ML; MG/100ML; MG/100ML; MG/100ML
INJECTION, SOLUTION INTRAVENOUS CONTINUOUS
Status: DISCONTINUED | OUTPATIENT
Start: 2018-07-23 | End: 2018-07-26 | Stop reason: HOSPADM

## 2018-07-23 RX ORDER — LIDOCAINE 40 MG/G
CREAM TOPICAL
Status: DISCONTINUED | OUTPATIENT
Start: 2018-07-23 | End: 2018-07-23

## 2018-07-23 RX ORDER — OXYCODONE HYDROCHLORIDE 5 MG/1
5-10 TABLET ORAL
Status: DISCONTINUED | OUTPATIENT
Start: 2018-07-23 | End: 2018-07-25

## 2018-07-23 RX ORDER — DIPHENHYDRAMINE HCL 25 MG
25 CAPSULE ORAL EVERY 6 HOURS PRN
Status: DISCONTINUED | OUTPATIENT
Start: 2018-07-23 | End: 2018-07-26 | Stop reason: HOSPADM

## 2018-07-23 RX ORDER — KETAMINE HYDROCHLORIDE 10 MG/ML
INJECTION INTRAMUSCULAR; INTRAVENOUS PRN
Status: DISCONTINUED | OUTPATIENT
Start: 2018-07-23 | End: 2018-07-23

## 2018-07-23 RX ORDER — NALOXONE HYDROCHLORIDE 0.4 MG/ML
.1-.4 INJECTION, SOLUTION INTRAMUSCULAR; INTRAVENOUS; SUBCUTANEOUS
Status: ACTIVE | OUTPATIENT
Start: 2018-07-23 | End: 2018-07-24

## 2018-07-23 RX ORDER — EPHEDRINE SULFATE 50 MG/ML
5 INJECTION, SOLUTION INTRAMUSCULAR; INTRAVENOUS; SUBCUTANEOUS
Status: DISCONTINUED | OUTPATIENT
Start: 2018-07-23 | End: 2018-07-26 | Stop reason: HOSPADM

## 2018-07-23 RX ORDER — HYDROMORPHONE HYDROCHLORIDE 1 MG/ML
.3-.5 INJECTION, SOLUTION INTRAMUSCULAR; INTRAVENOUS; SUBCUTANEOUS EVERY 30 MIN PRN
Status: DISCONTINUED | OUTPATIENT
Start: 2018-07-23 | End: 2018-07-26 | Stop reason: HOSPADM

## 2018-07-23 RX ORDER — ONDANSETRON 2 MG/ML
4 INJECTION INTRAMUSCULAR; INTRAVENOUS EVERY 6 HOURS PRN
Status: DISCONTINUED | OUTPATIENT
Start: 2018-07-23 | End: 2018-07-26 | Stop reason: HOSPADM

## 2018-07-23 RX ORDER — DIPHENHYDRAMINE HYDROCHLORIDE 50 MG/ML
25 INJECTION INTRAMUSCULAR; INTRAVENOUS EVERY 6 HOURS PRN
Status: DISCONTINUED | OUTPATIENT
Start: 2018-07-23 | End: 2018-07-26 | Stop reason: HOSPADM

## 2018-07-23 RX ORDER — MISOPROSTOL 200 UG/1
400 TABLET ORAL
Status: DISCONTINUED | OUTPATIENT
Start: 2018-07-23 | End: 2018-07-26 | Stop reason: HOSPADM

## 2018-07-23 RX ORDER — KETOROLAC TROMETHAMINE 30 MG/ML
30 INJECTION, SOLUTION INTRAMUSCULAR; INTRAVENOUS EVERY 6 HOURS
Status: CANCELLED | OUTPATIENT
Start: 2018-07-23 | End: 2018-07-24

## 2018-07-23 RX ORDER — ONDANSETRON 2 MG/ML
4 INJECTION INTRAMUSCULAR; INTRAVENOUS EVERY 30 MIN PRN
Status: DISCONTINUED | OUTPATIENT
Start: 2018-07-23 | End: 2018-07-23 | Stop reason: HOSPADM

## 2018-07-23 RX ORDER — OXYTOCIN 10 [USP'U]/ML
10 INJECTION, SOLUTION INTRAMUSCULAR; INTRAVENOUS
Status: DISCONTINUED | OUTPATIENT
Start: 2018-07-23 | End: 2018-07-26 | Stop reason: HOSPADM

## 2018-07-23 RX ORDER — METHYLERGONOVINE MALEATE 0.2 MG/ML
200 INJECTION INTRAVENOUS
Status: DISCONTINUED | OUTPATIENT
Start: 2018-07-23 | End: 2018-07-26 | Stop reason: HOSPADM

## 2018-07-23 RX ORDER — FENTANYL CITRATE 50 UG/ML
25-50 INJECTION, SOLUTION INTRAMUSCULAR; INTRAVENOUS
Status: DISCONTINUED | OUTPATIENT
Start: 2018-07-23 | End: 2018-07-23 | Stop reason: HOSPADM

## 2018-07-23 RX ORDER — PROPOFOL 10 MG/ML
INJECTION, EMULSION INTRAVENOUS CONTINUOUS PRN
Status: DISCONTINUED | OUTPATIENT
Start: 2018-07-23 | End: 2018-07-23

## 2018-07-23 RX ORDER — CEFAZOLIN SODIUM 1 G/3ML
1 INJECTION, POWDER, FOR SOLUTION INTRAMUSCULAR; INTRAVENOUS SEE ADMIN INSTRUCTIONS
Status: DISCONTINUED | OUTPATIENT
Start: 2018-07-23 | End: 2018-07-23 | Stop reason: HOSPADM

## 2018-07-23 RX ORDER — NALBUPHINE HYDROCHLORIDE 10 MG/ML
2.5-5 INJECTION, SOLUTION INTRAMUSCULAR; INTRAVENOUS; SUBCUTANEOUS EVERY 6 HOURS PRN
Status: DISCONTINUED | OUTPATIENT
Start: 2018-07-23 | End: 2018-07-26 | Stop reason: HOSPADM

## 2018-07-23 RX ORDER — OXYTOCIN/0.9 % SODIUM CHLORIDE 30/500 ML
PLASTIC BAG, INJECTION (ML) INTRAVENOUS
Status: DISCONTINUED
Start: 2018-07-23 | End: 2018-07-23 | Stop reason: HOSPADM

## 2018-07-23 RX ADMIN — Medication 30 ML: at 11:24

## 2018-07-23 RX ADMIN — HYDROMORPHONE HYDROCHLORIDE 0.5 MG: 1 INJECTION, SOLUTION INTRAMUSCULAR; INTRAVENOUS; SUBCUTANEOUS at 14:23

## 2018-07-23 RX ADMIN — ONDANSETRON 4 MG: 2 INJECTION INTRAMUSCULAR; INTRAVENOUS at 12:15

## 2018-07-23 RX ADMIN — FENTANYL CITRATE 50 MCG: 50 INJECTION, SOLUTION INTRAMUSCULAR; INTRAVENOUS at 12:13

## 2018-07-23 RX ADMIN — MORPHINE SULFATE 0.15 MG: 1 INJECTION, SOLUTION EPIDURAL; INTRATHECAL; INTRAVENOUS at 11:41

## 2018-07-23 RX ADMIN — PHENYLEPHRINE HYDROCHLORIDE 100 MCG: 10 INJECTION, SOLUTION INTRAMUSCULAR; INTRAVENOUS; SUBCUTANEOUS at 13:00

## 2018-07-23 RX ADMIN — PROPOFOL 20 MCG/KG/MIN: 10 INJECTION, EMULSION INTRAVENOUS at 12:32

## 2018-07-23 RX ADMIN — OXYCODONE HYDROCHLORIDE 5 MG: 5 TABLET ORAL at 20:58

## 2018-07-23 RX ADMIN — ACETAMINOPHEN 975 MG: 325 TABLET, FILM COATED ORAL at 15:40

## 2018-07-23 RX ADMIN — FENTANYL CITRATE 25 MCG: 50 INJECTION, SOLUTION INTRAMUSCULAR; INTRAVENOUS at 12:23

## 2018-07-23 RX ADMIN — SIMETHICONE CHEW TAB 80 MG 80 MG: 80 TABLET ORAL at 19:21

## 2018-07-23 RX ADMIN — SODIUM CHLORIDE, SODIUM LACTATE, POTASSIUM CHLORIDE, CALCIUM CHLORIDE AND DEXTROSE MONOHYDRATE: 5; 600; 310; 30; 20 INJECTION, SOLUTION INTRAVENOUS at 20:58

## 2018-07-23 RX ADMIN — SODIUM CHLORIDE, POTASSIUM CHLORIDE, SODIUM LACTATE AND CALCIUM CHLORIDE: 600; 310; 30; 20 INJECTION, SOLUTION INTRAVENOUS at 11:36

## 2018-07-23 RX ADMIN — KETAMINE HCL-NACL SOLN PREF SY 50 MG/5ML-0.9% (10MG/ML) 10 MG: 10 SOLUTION PREFILLED SYRINGE at 12:42

## 2018-07-23 RX ADMIN — FENTANYL CITRATE 25 MCG: 50 INJECTION, SOLUTION INTRAMUSCULAR; INTRAVENOUS at 12:21

## 2018-07-23 RX ADMIN — FENTANYL CITRATE 25 MCG: 50 INJECTION, SOLUTION INTRAMUSCULAR; INTRAVENOUS at 12:46

## 2018-07-23 RX ADMIN — FENTANYL CITRATE 50 MCG: 50 INJECTION, SOLUTION INTRAMUSCULAR; INTRAVENOUS at 12:38

## 2018-07-23 RX ADMIN — KETAMINE HCL-NACL SOLN PREF SY 50 MG/5ML-0.9% (10MG/ML) 10 MG: 10 SOLUTION PREFILLED SYRINGE at 12:57

## 2018-07-23 RX ADMIN — OXYCODONE HYDROCHLORIDE 5 MG: 5 TABLET ORAL at 20:16

## 2018-07-23 RX ADMIN — OXYCODONE HYDROCHLORIDE 10 MG: 5 TABLET ORAL at 15:40

## 2018-07-23 RX ADMIN — OXYTOCIN-SODIUM CHLORIDE 0.9% IV SOLN 30 UNIT/500ML 100 ML/HR: 30-0.9/5 SOLUTION at 15:41

## 2018-07-23 RX ADMIN — BUPIVACAINE HYDROCHLORIDE 1.7 ML: 7.5 INJECTION, SOLUTION EPIDURAL; RETROBULBAR at 11:41

## 2018-07-23 RX ADMIN — CEFAZOLIN SODIUM 2 G: 2 INJECTION, SOLUTION INTRAVENOUS at 11:48

## 2018-07-23 RX ADMIN — BUPIVACAINE HYDROCHLORIDE AND EPINEPHRINE BITARTRATE 20 ML: 2.5; .005 INJECTION, SOLUTION INFILTRATION; PERINEURAL at 13:47

## 2018-07-23 RX ADMIN — SENNOSIDES AND DOCUSATE SODIUM 1 TABLET: 8.6; 5 TABLET ORAL at 19:21

## 2018-07-23 RX ADMIN — BUPIVACAINE 20 ML: 13.3 INJECTION, SUSPENSION, LIPOSOMAL INFILTRATION at 13:47

## 2018-07-23 RX ADMIN — SODIUM CHLORIDE, POTASSIUM CHLORIDE, SODIUM LACTATE AND CALCIUM CHLORIDE: 600; 310; 30; 20 INJECTION, SOLUTION INTRAVENOUS at 11:28

## 2018-07-23 RX ADMIN — SILDENAFIL CITRATE 20 MG: 20 TABLET, FILM COATED ORAL at 16:18

## 2018-07-23 RX ADMIN — SODIUM CHLORIDE, POTASSIUM CHLORIDE, SODIUM LACTATE AND CALCIUM CHLORIDE 1000 ML: 600; 310; 30; 20 INJECTION, SOLUTION INTRAVENOUS at 10:21

## 2018-07-23 RX ADMIN — KETAMINE HCL-NACL SOLN PREF SY 50 MG/5ML-0.9% (10MG/ML) 10 MG: 10 SOLUTION PREFILLED SYRINGE at 12:40

## 2018-07-23 RX ADMIN — PHENYLEPHRINE HYDROCHLORIDE 0.4 MCG/KG/MIN: 10 INJECTION, SOLUTION INTRAMUSCULAR; INTRAVENOUS; SUBCUTANEOUS at 11:41

## 2018-07-23 RX ADMIN — OXYTOCIN-SODIUM CHLORIDE 0.9% IV SOLN 30 UNIT/500ML 300 ML/HR: 30-0.9/5 SOLUTION at 12:10

## 2018-07-23 RX ADMIN — SODIUM CITRATE AND CITRIC ACID MONOHYDRATE 30 ML: 500; 334 SOLUTION ORAL at 11:24

## 2018-07-23 NOTE — PLAN OF CARE
Problem: Patient Care Overview  Goal: Plan of Care/Patient Progress Review  Outcome: Improving  Data: Anjelica Holloway transferred to [id number] via cart. Baby transferred via parent's arms.  Action: Receiving unit notified of transfer: Yes. Patient and family notified of room change. Report given to Melody TRUJILLO at 1538. Belongings sent to receiving unit. Accompanied by Registered Nurse. Oriented patient to surroundings. Call light within reach. ID bands double-checked with receiving RN (Courtney SALMERON).  Response: Patient tolerated transfer and is stable.

## 2018-07-23 NOTE — ANESTHESIA PROCEDURE NOTES
Spinal/LP Procedure Note    Spinal Block  Staff:     Anesthesiologist:  TORIN MADDEN    Resident/CRNA:  JENNIFER ANNA    Spinal/LP performed by resident/CRNA in presence of a teaching physician.    Location: In OR BEFORE Induction  Procedure Start/Stop Times:      7/23/2018 11:35 AM     7/23/2018 11:41 AM    patient identified, IV checked, site marked, risks and benefits discussed, informed consent, monitors and equipment checked, pre-op evaluation, at physician/surgeon's request and post-op pain management      Correct Patient: Yes      Correct Position: Yes      Correct Site: Yes      Correct Procedure: Yes      Correct Laterality:  Yes    Site Marked:  Yes  Procedure:     Procedure:  Intrathecal    ASA:  3    Position:  Sitting    Sterile Prep: chloraprep, mask, sterile gloves and patient draped      Insertion site:  L4-5    Approach:  Midline    Needle Type:  Mckinley    Needle gauge (G):  25    Local Skin Infiltration:  1% lidocaine    Needle Length (in):  3.5    Introducer used: Yes      Introducer gauge:  20 G    Attempts:  1    Redirects:  2    CSF:  Clear    Paresthesias:  No  Assessment/Narrative:     Sensory Level:  T6

## 2018-07-23 NOTE — DISCHARGE SUMMARY
[facility] Discharge Summary    Anjelica Holloway MRN# [id number]   Age: 23 year old YOB: 1995     Date of Admission:  2018  Date of Discharge::  18   Admitting Physician:  Jayne Smith MD  Discharge Physician:  Thu Toledo MD             Admission Diagnoses:   1.  at 38w0d  1. 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 Congenital vascular anomalies of lower extremities with peripheral vascular disease  3. H/o  section x1  4. Anxiety  5. FLNA gene mutation  6. Chronic anemia          Discharge Diagnosis:   Same, now , delivered   Acute on chronic blood loss anemia, expected for surgical blood loss          Procedures:   Procedure(s): 1. Repeat low-transverse  section with two layer uterine closure via Pfannensteil incision  2. Lysis of adhesions                Medications Prior to Admission:     Prescriptions Prior to Admission   Medication Sig Dispense Refill Last Dose     acetaminophen 500 MG CAPS Take 1 capsule by mouth as needed.   Past Week at Unknown time     Cholecalciferol (VITAMIN D3 PO) Take 1,000 Units by mouth daily.   Past Week at Unknown time     cyclobenzaprine (FLEXERIL) 10 MG tablet Take 1 tablet (10 mg) by mouth 3 times daily as needed for muscle spasms 42 tablet 3 Past Month at Unknown time     cyclobenzaprine (FLEXERIL) 5 MG tablet Take 1 tablet (5 mg) by mouth nightly as needed for muscle spasms 20 tablet 0 Past Month at Unknown time     Prenatal Vit-Fe Fumarate-FA (PRENATAL MULTIVITAMIN PLUS IRON) 27-0.8 MG TABS per tablet Take 1 tablet by mouth daily   Past Week at Unknown time     sildenafil (REVATIO/VIAGRA) 20 MG tablet Take 1 tablet by mouth 3 times daily. 90 tablet 3 2018 at Unknown time     calcium-vitamin D-vitamin K (VIACTIV) 500-500-40 MG-UNT-MCG CHEW Take 1 tablet by mouth daily.   Unknown at Unknown time     citalopram (CELEXA) 20 MG tablet Take 1/2 tablet (10 mg) for 1-2 weeks,  then increase to 1 tablet orally daily (Patient taking differently: 5 mg daily Take 1/2 tablet (10 mg) for 1-2 weeks, then increase to 1 tablet orally daily) 30 tablet 3 7/2/2018     methylphenidate (RITALIN LA) 10 MG CP24 Take 30 mg by mouth 2 tabs of 10mg in A.M and 1  10mg in P.M   Unknown at Unknown time     oseltamivir (TAMIFLU) 75 MG capsule Take 1 capsule (75 mg) by mouth 2 times daily (Patient not taking: Reported on 6/19/2018) 10 capsule 0 1/23/2018     PARoxetine (PAXIL) 10 MG tablet Take 10 mg by mouth every morning   More than a month at Unknown time             Discharge Medications:      Anjelica Holloway   [facility] Medication Instructions PHIL:[phone]    Printed on:07/26/18 0827   Medication Information                      calcium-vitamin D-vitamin K (VIACTIV) 500-500-40 MG-UNT-MCG CHEW  Take 1 tablet by mouth daily.             Cholecalciferol (VITAMIN D3 PO)  Take 1,000 Units by mouth daily.             cyclobenzaprine (FLEXERIL) 10 MG tablet  Take 1 tablet (10 mg) by mouth 3 times daily as needed for muscle spasms             cyclobenzaprine (FLEXERIL) 5 MG tablet  Take 1 tablet (5 mg) by mouth nightly as needed for muscle spasms             ferrous sulfate (IRON) 325 (65 Fe) MG tablet  Take 1 tablet (325 mg) by mouth daily (with breakfast)             ibuprofen (ADVIL/MOTRIN) 600 MG tablet  Take 1 tablet (600 mg) by mouth every 6 hours as needed for moderate pain             Lidocaine HCl 3 % GEL  Externally apply topically daily as needed             methylphenidate (RITALIN LA) 10 MG CP24  Take 30 mg by mouth 2 tabs of 10mg in A.M and 1  10mg in P.M             oxyCODONE-acetaminophen (PERCOCET) 5-325 MG per tablet  Take 1-2 tablets by mouth every 4 hours as needed for moderate to severe pain             PARoxetine (PAXIL) 10 MG tablet  Take 1 tablet (10 mg) by mouth every morning             Prenatal Vit-Fe Fumarate-FA (PRENATAL MULTIVITAMIN PLUS IRON) 27-0.8 MG TABS per tablet  Take 1 tablet by  mouth daily             senna-docusate (SENOKOT-S;PERICOLACE) 8.6-50 MG per tablet  Take 2 tablets by mouth 2 times daily as needed for constipation             sildenafil (REVATIO/VIAGRA) 20 MG tablet  Take 1 tablet by mouth 3 times daily.                       Consultations:     Anesthesia  Lactation        Brief Admission History:       Ms. Anjelica Holloway is a 23 year old now  who initially presented at 38w0d for scheduled repeat .  Pregnancy notable for:  1. 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 Congenital vascular anomalies of lower extremities with peripheral vascular disease  3. H/o  section x1  4. Anxiety on celexa  5. FLNA gene mutation:  genetic evaluation of baby for possible otopalatodigital spectrum disorders after birth   Intraoperative course     The procedure was uncomplicated.   mL.      Operative Findings:  1. Thickened fascia, moderate rectofascial adhesions, dense uterine adhesions to anterior abdominal wall involving portions of the omentum. These were taken down sharply and ends tied off with 0-Vicryl. Defect in anterior uterine serosa and myometrium from take down of adhesions was repaired in two layers with multiple additional figure of 8 stitches with good resulting hemostasis.  2. Clear amniotic fluid  3. Liveborn female infant in cephalic presentation. Apgars 9 at 1 minute & 9 at 5 minutes.  4. Normal uterus, fallopian tubes, and ovaries.     See operative report for details.        Postpartum Course     The patient's hospital course was unremarkable.  She recovered as anticipated and experienced no post-operative complications. On discharge, her pain was well controlled. Vaginal bleeding is similar to peak menstrual flow.  Voiding without difficulty.  Ambulating well and tolerating a normal diet.  No fever or significant wound drainage.  Breastfeeding well.  Infant is stable.  No bowel movement yet.  She was discharged  on post-partum day #3.    Post-partum hemoglobin: 9.3         Discharge Instructions and Follow-Up:   Discharge diet: Regular   Discharge activity: No lifting greater than 20 lbs, pushing, pulling, or other strenuous activity for 6 weeks. Pelvic rest for 6 weeks including no sexual intercourse, tampons, or douching. No driving until you can slam on the breaks without pain or while on narcotic pain medications.    Discharge follow-up: Follow up with primary OB in 1 week for mood check  Follow up with primary OB for routine postpartum visit in 6 weeks   Wound care: Keep incision clean and dry            Discharge Disposition:   Discharged to home     Etelvina Yoo MD  Ob/Gyn PGY-2  7/26/18 6:25 AM       Staff MD Note    I evaluated the patient on the day of discharge.  We reviewed discharge instructions.  Patient stable for discharge.    Thu Toledo MD

## 2018-07-23 NOTE — PLAN OF CARE
Problem: Patient Care Overview  Goal: Plan of Care/Patient Progress Review  Outcome: Improving  OR to PACU Transfer Note  Data: Pt to OB PACU via cart. PIV infusing pitocin without complications, presley with yellow urine to gravity,vss, pt does not complain of pain and/or nausea.   Interventions: IV to pump, monitors and alarms on, SCD on.  Response: stable .  Plan: Patient instructed to notify RN for pain or nausea, routine post op cares, initiate breastfeeding/pumping as soon as patient/infant able.

## 2018-07-23 NOTE — OP NOTE
[facility]  Full Operative Progress Note     Surgery Date:  2018  Surgeon:  Jayne Smith MD  Assistants:    Sandip Singh MD PGY-3  Jeancarlos Rehman MD, [facility] Fellow  Pre-op Diagnosis:    1.  at 38w0d  1. 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 Congenital vascular anomalies of lower extremities with peripheral vascular disease  3. H/o  section x1, desires repeat  delivery  4. Anxiety  5. FLNA gene mutation    Post-op Diagnosis:    1. Same, now   2. Liveborn female infant   3. Significant adhesions between the anterior, mid uterus to the overlying anterior abdominal wall     Procedure:    1. Repeat low-transverse  section with two layer uterine closure via Pfannensteil incision  2. Lysis of adhesions    Anesthesia: Spinal with duramorph, TAP blocks postop  EBL:  580 mL  IVF:  1500 mL crystalloid  UOP:  300 mL clear urine at the end of the case  Drains: Presley catheter   Specimens:  Cord segment  Complications: None     Indications:   Anjelica Holloway is a 23 year old  at 38w0d admitted for repeat  section.  The risks, benefits, and alternatives of  section were discussed with the patient, and she agreed to proceed.     Findings:   1. Thickened fascia, moderate rectofascial adhesions, dense uterine adhesions to anterior abdominal wall involving portions of the omentum. Defect in anterior uterine serosa and myometrium from take down of adhesions was repaired in two layers with multiple additional figure of 8 stitches with good resulting hemostasis.  2. Clear amniotic fluid  3. Liveborn female infant in cephalic presentation. Apgars 9 at 1 minute & 9 at 5 minutes.  4. Normal uterus, fallopian tubes, and ovaries.     Procedure Details:   The patient was brought to the OR, where adequate spinal anesthesia was administered.  She was placed in the dorsal supine position with a slight leftward tilt.  A presley  catheter was placed into her bladder.  She was prepped and draped in the usual sterile fashion. A surgical time out was performed. A pfannenstiel skin incision was made with the scalpel through prior scar, and carried down to the underlying fascia with sharp and blunt dissection. The fascia was incised in the midline, and the incision was extended laterally with the Richard scissors. The superior aspect of the fascia was grasped with the Kocher clamps and dissected off of the underlying rectus muscles with blunt and sharp dissection. Attention was then turned to the inferior aspect of the fascia, which was similarly dissected off of the underlying rectus muscles. The rectus muscles were  in the midline using noy clamps. The peritoneum was elevated with noy clamps entered with Metzenbaum scissors. The opening was very tight and extended with electrocautery with care to avoid underlying structures. Middle third of the anterior portion of the uterus was densely adherent to anterior abdominal wall.  Thick adhesion bands were taken down by doubly clamping with noy clamps, transecting, and pedicles ligated with free ties of 0-Vicryl. Omental adhesions to anterior uterus similarly taken down. The bladder blade was placed. The vesicouterine peritoneum was incised in the midline, and the incision was extended laterally with the Metzenbaum scissors. A bladder flap was created digitally and the bladder blade was replaced. A transverse hysterotomy was made with the scalpel in the lower uterine segment, and the incision was extended with digital pressure. Clear amniotic fluid noted. The infant was noted to be in the cephalic presentation, and was delivered atraumatically. The shoulders delivered easily. No nuchal cord was noted. After 1 minute delay, the cord was doubly clamped and cut, and the infant was handed off to the awaiting nursery staff. A segment of cord was cut and held. The placenta was delivered with  gentle traction on the umbilical cord and uterine massage. The uterus was exteriorized and cleared of all clots and debris. Uterine tone was noted to be firm with 20 units of pitocin given through the running IV and uterine massage.  The hysterotomy was closed with a running locked suture of 0 Vicryl.  The hysterotomy was then imbricated using an 0 Vicryl suture. Bleeding from left apex controlled with three figure of 8 stitches of 0-Vicryl. The hysterotomy was noted to be hemostatic. Approximately 4 cm vertical defect in anterior uterine serosa and myometrium secondary to take down of adhesions was repaired in two layers. First a running suture of 0-Vicryl. The second a locked running suture of 0-Monocryl. Multiple additional figure of 8 stitches were placed resulting in good hemostasis.    The posterior cul-de-sac was cleared of all clots and debris. The uterus was returned to the abdomen. The pericolic gutters were cleared of all clots and debris. The hysterotomy and the repaired defect along the anterior portion of the uterus was reexamined and noted to be hemostatic. A piece of seprafilm was placed over the hysterotomy and another piece along the repaired anterior uterine wall defect. The fascia and rectus muscles were examined and areas of oozing were controlled with electrocautery. A piece of seprafilm was placed over the rectus muscles. The fascia was closed with a running 0 Vicryl suture. The subcutaneous tissue was irrigated and areas of oozing were controlled with electrocautery. The subcutaneous tissue was re-approximated with three interrupted box sutures of 3-0 Vicryl. . The skin was closed with subcuticular suture of 4-0 Monocryl and covered with steri-strips, and a sterile dressing. Dr. Smith present during entire procedure.    Sandip Singh MD  OB/GYN Resident, PGY-2  7/23/2018    I was scrubbed and present for the entire procedure.  I have reviewed and edited the above note.    Jayne Smith MD,  FACOG

## 2018-07-23 NOTE — H&P
[facility] Labor and Delivery History and Physical    Anjelica Holloway MRN# [id number]   Age: 23 year old YOB: 1995     Date of Admission:  2018    Primary care provider: Zackery Bowen    CC: scheduled repeat  section    HPI: Anjelica Holloway is a 23 year old  at 38w0d by LMP c/w 7+1 US who presents for scheduled repeat  section. Denies vaginal bleeding or loss of fluid. Notes good fetal movement. Has not had any lower extremity pain from peripheral vascular disease. Continues to take sildenafil. Last took yesterday morning.     Pregnancy complicated by:  1. 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 Congenital vascular anomalies of lower extremities with peripheral vascular disease  3. H/o  section x1  4. Anxiety on celexa  5. FLNA gene mutation:  genetic evaluation of baby for possible otopalatodigital spectrum disorders after birth    ROS:   Complete 10-point ROS negative except as noted in HPI. + Headache but denies blurry vision, chest pain, shortness of breath, RUQ pain, nausea, vomiting.     Pregnancy History:  OBSTETRIC HISTORY:    Obstetric History       T1      L1     SAB0   TAB0   Ectopic0   Multiple0   Live Births1       # Outcome Date GA Lbr Lewis/2nd Weight Sex Delivery Anes PTL Lv   2 Current            1 Term 05/20/15 38w0d  3.204 kg (7 lb 1 oz) F CS-LTranv  N KEYON      Apgar1:  9                Apgar5: 9        Prenatal Labs:   Lab Results   Component Value Date    ABO A 2018    RH Pos 2018    AS Neg 2018    HEPBANG Nonreactive 2018    CHPCRT Negative 2018    GCPCRT Negative 2018    TREPAB Negative 2018    RUBELLAABIGG positive 2014    HGB 10.9 (L) 2018     GBS Status:   Lab Results   Component Value Date    GBS Negative 2018     Active Problem List  Patient Active Problem List   Diagnosis     PAD (peripheral  artery disease) (H)     History of Meckel's diverticulum     S/P cholecystectomy     Attention deficit disorder of childhood     Corpus albicans cyst of ovary     Ehler's-Danlos syndrome     Vascular abnormality     Other current maternal conditions classifiable elsewhere, antepartum     S/P  section     Medication Prior to Admission:   PNV, Sildenafil 20 mg tid, Sertraline, Methylphenidate, Gabapentin, Tylenol, flexeril    Medical History  Past Medical History:   Diagnosis Date     ADHD (attention deficit hyperactivity disorder)      Ehler's-Danlos syndrome 2013     Problem list name updated by automated process. Provider to review and confirm     Headaches      Joint hyperextensibility of multiple sites     suspect Ehler-Danlos but genetics testing not conclusive      Meckel's diverticulum     rupture of Meckel's diverticulum s/p bowel resection, approx 4 inches removed     PAD (peripheral artery disease) (H) 2012    Non-atherosclerotic PAD due to unknown arterial occlusive disease, presumed Bill-Danlos syndrome, with superimposed vasospasm      Peptic ulcer disease     secondary to prolonged NSAIDs use for headache     POTS (postural orthostatic tachycardia syndrome)      Vascular abnormality 2013     Vascular anomalies, 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, and hypoplastic right posterior tibular arteries     Surgical History  Past Surgical History:   Procedure Laterality Date     ~4 inches of bowel resection after ruptured Meckel's Diverticulum  rupture Meckel diverticulum with removal of necroic bowel    Removal of      APPENDECTOMY        SECTION N/A 2015    Procedure:  SECTION;  Surgeon: Zulma Chavez MD;  Location:  L+D     CHOLECYSTECTOMY           Physical Exam:  Vitals:    18 0909   BP: 122/71   Pulse: 86   Resp: 18   Temp: 97.7  F (36.5  C)   TempSrc: Oral   Weight: 72.6 kg (160 lb)  "  Height: 1.702 m (5' 7\")     Gen: Well appearing, no apparent distress  CV: RRR, no murmurs  Pulm: CTAB, no wheezes   GI: gravid, soft, nontender. EFW 8#    Fetal Heart Rate Tracing: Baseline 140, moderate variability, accels present, no decels  Tocometer: uterine irritability, no contractions    # Pain Assessment: Anjelica jane pain level was assessed and she currently denies pain.      Assessment:  Anjelica Holloway is a 23 year old  at 37w6d by LMP c/w 7+1 US admitted for scheduled repeat  section.    Plan:    # RLTCS  - Admit to L&D  - 2g ancef prior to skin incision  - Reviewed risks/benefits/alternatives. Consent form signed.    # Fetal Wellbeing  -EFW  8#  -Category I, reactive.  Continue monitoring per protocol    # Postpartum cares  - Fd: plans breast  - BC: IUD at 6w PP visit    # Anxiety  - Weaned off celexa. Last dose 2 weeks ago. Desires to restart paxil in postpartum period.    Sandip Singh MD  OB/GYN Resident, PGY-3  2018     The patient was seen and examined by me separately from the team.  I have reviewed and agree with the above assessment and plan of care.  Plan to proceed with scheduled repeat c/s today as above.    Jayne Smith MD, FACOG    "

## 2018-07-23 NOTE — ANESTHESIA CARE TRANSFER NOTE
Patient: Anjelica Holloway    Procedure(s):  Repeat  Section  - Wound Class: II-Clean Contaminated    Diagnosis: Ehler's-Danlos Syndrome   Diagnosis Additional Information: No value filed.    Anesthesia Type:   Spinal, Periph. Nerve Block for postop pain     Note:  Airway :Room Air  Patient transferred to:PACU  Comments: Patient resting comfortably, breathing spontaneously.  Javier Vila  Handoff Report: Identifed the Patient, Identified the Reponsible Provider, Reviewed the pertinent medical history, Discussed the surgical course, Reviewed Intra-OP anesthesia mangement and issues during anesthesia, Set expectations for post-procedure period and Allowed opportunity for questions and acknowledgement of understanding      Vitals: (Last set prior to Anesthesia Care Transfer)    CRNA VITALS  2018 1242 - 2018 1330      2018             NIBP: 115/52    Pulse: 95    NIBP Mean: 70    Ht Rate: 93    SpO2: 98 %                Electronically Signed By: Javier Vila MD  2018  1:30 PM

## 2018-07-23 NOTE — PLAN OF CARE
Problem: Patient Care Overview  Goal: Plan of Care/Patient Progress Review  Outcome: Improving  Scheduled AM C/S Admit Note  Anjelica Holloway  Gestational Age: 38w0d      Anjelica Holloway presents for scheduled  section (repeat).  Patient denies contractions, bleeding or LOF.  NPO since  @ 2300 .    Past Medical History:   Diagnosis Date     ADHD (attention deficit hyperactivity disorder)      Ehler's-Danlos syndrome 2013     Problem list name updated by automated process. Provider to review and confirm     Headaches      Joint hyperextensibility of multiple sites     suspect Ehler-Danlos but genetics testing not conclusive      Meckel's diverticulum     rupture of Meckel's diverticulum s/p bowel resection, approx 4 inches removed     PAD (peripheral artery disease) (H) 2012    Non-atherosclerotic PAD due to unknown arterial occlusive disease, presumed Bill-Danlos syndrome, with superimposed vasospasm      Peptic ulcer disease     secondary to prolonged NSAIDs use for headache     POTS (postural orthostatic tachycardia syndrome)      Vascular abnormality 2013     Vascular anomalies, 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, and hypoplastic right posterior tibular arteries     Past Surgical History:   Procedure Laterality Date     ------------OTHER-------------  rupture Meckel diverticulum with removal of necroic bowel    Removal of      APPENDECTOMY        SECTION N/A 2015    Procedure:  SECTION;  Surgeon: Zulma Chavez MD;  Location:  L+D     [facility]         Dr Singh G3 notified of patient's arrival and condition.    FHT: 140/ mod variability/ accels present  NST: Reactive .    Plan:  - section at 1030.

## 2018-07-23 NOTE — IP AVS SNAPSHOT
[facility]    [address] [address]    Phone:  [phone]                                       After Visit Summary   7/23/2018    Anjelica Holloway    MRN: [id number]           After Visit Summary Signature Page     I have received my discharge instructions, and my questions have been answered. I have discussed any challenges I see with this plan with the nurse or doctor.    ..........................................................................................................................................  Patient/Patient Representative Signature      ..........................................................................................................................................  Patient Representative Print Name and Relationship to Patient    ..................................................               ................................................  Date                                            Time    ..........................................................................................................................................  Reviewed by Signature/Title    ...................................................              ..............................................  Date                                                            Time

## 2018-07-23 NOTE — BRIEF OP NOTE
[facility]   Brief Operative Note     Surgery Date: 2018  Surgeon:  Jayne Smith MD  Assistants:    Sandip Singh MD PGY-3  Jeancarlos Rehman MD, [facility] Fellow      Pre-op Diagnosis:    1.  at 38w0d  1. 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 Congenital vascular anomalies of lower extremities with peripheral vascular disease  3. H/o  section x1  4. Anxiety on celexa  5. FLNA gene mutation    Post-op Diagnosis:    1. Same   2. Liveborn female infant   Procedure:    1. Repeat low-transverse  section with double layer uterine closure via Pfannensteil incision  2. Lysis of adhesions    Anesthesia: Spinal with duramorph, TAP block postop  EBL:  580 mL  IVF:  1500 mL crystalloid  UOP:  300 mL clear urine at the end of the case  Drains: Cotsa catheter   Specimens:  Cord segment  Complications: None     Findings:   1. Thickened fascia, moderate rectofascial adhesions, dense uterine adhesions to anterior abdominal wall involving portions of the omentum. These were taken down sharply and ends tied off with 0-Vicryl. Defect in anterior uterine serosa and myometrium from take down of adhesions was repaired in two layers with multiple additional figure of 8 stitches with good resulting hemostasis.  2. Clear amniotic fluid  3. Liveborn female infant in cephalic presentation. Apgars 9 at 1 minute & 9 at 5 minutes.  4. Normal uterus, fallopian tubes, and ovaries.     Disposition:  Stable to PACU    Sandip Singh MD  OB/GYN Resident, PGY-2  2018 1:13 PM

## 2018-07-23 NOTE — ANESTHESIA POSTPROCEDURE EVALUATION
Patient: Anjelica Holloway    Procedure(s):  Repeat  Section  - Wound Class: II-Clean Contaminated    Diagnosis:Ehler's-Danlos Syndrome   Diagnosis Additional Information: No value filed.    Anesthesia Type:  Spinal, Periph. Nerve Block for postop pain    Note:  Anesthesia Post Evaluation    Patient location during evaluation: PACU  Patient participation: Able to fully participate in evaluation  Level of consciousness: awake and alert  Pain management: adequate  Airway patency: patent  Cardiovascular status: acceptable  Respiratory status: acceptable  Hydration status: acceptable  PONV: none     Anesthetic complications: None          Last vitals:  Vitals:    18 1450 18 1505 18 1528   BP: 115/67 129/61 121/68   Pulse:  79    Resp: 28  20   Temp:   36.9  C (98.5  F)   SpO2: 99% 99% 98%         Electronically Signed By: Stephani Linda MD  2018  4:15 PM

## 2018-07-23 NOTE — PLAN OF CARE
Patient to PACU via cart.  VSS, Pitocin infusing without complications, presley to gravity with clear yellow urine, dressing clean, dry and intact, pneumoboots re-started. Will continue to monitor and will notify provider if there is a change in status.

## 2018-07-23 NOTE — PROGRESS NOTES
Patient arrived to [facility] unit via zoom cart at 1515,with belongings, accompanied by spouse/ significant other, with infant in arms.  Pt settled and bands checked with with resource RN Carina SALMERON and Farzana ROBERTS. Received report from Farzana ROBERTS.  Unit and room orientation started. Call light given; no concerns present at this time. Continue with plan of care.

## 2018-07-23 NOTE — ANESTHESIA PROCEDURE NOTES
Peripheral Nerve Block Procedure Note    Staff:     Anesthesiologist:  TORIN MADDEN    Resident/CRNA:  JENNIFER ANNA    Block performed by resident/CRNA in the presence of a teaching physician    Location: PACU  Procedure Start/Stop TImes:      7/23/2018 1:40 PM     7/23/2018 1:47 PM    patient identified, IV checked, site marked, risks and benefits discussed, informed consent, monitors and equipment checked, pre-op evaluation, at physician/surgeon's request and post-op pain management      Correct Patient: Yes      Correct Position: Yes      Correct Site: Yes      Correct Procedure: Yes      Correct Laterality:  Yes    Site Marked:  Yes  Procedure details:     Procedure:  TAP    ASA:  3    Laterality:  Bilateral    Position:  Supine    Sterile Prep: chloraprep, patient draped, mask and sterile gloves      Needle:  Short bevel    Needle gauge:  21    Needle length (mm):  110    Ultrasound: Yes      Ultrasound used to identify targeted nerve, plexus, or vascular structure and placed a needle adjacent to it      Permanent Image entered into patiient's record      Abnormal pain on injection: No      Blood Aspirated: No      Paresthesias:  No    Test dose negative for signs of intravascular injection: Yes      Bolus via:  Needle    Infusion Method:  Single Shot    Complications:  None  Assessment/Narrative:      Printer did not work, no image was saved.

## 2018-07-23 NOTE — IP AVS SNAPSHOT
MRN:[id number]                      After Visit Summary   7/23/2018    Anjelica Holloway    MRN: [id number]           Thank you!     Thank you for choosing [facility] for your care. Our goal is always to provide you with excellent care. Hearing back from our patients is one way we can continue to improve our services. Please take a few minutes to complete the written survey that you may receive in the mail after you visit with us. Thank you!        Patient Information     Date Of Birth          1995        Designated Caregiver       Most Recent Value    Caregiver    Will someone help with your care after discharge? no      About your hospital stay     You were admitted on:  July 23, 2018 You last received care in the:  [facility]    You were discharged on:  July 26, 2018        Reason for your hospital stay       Maternity care                  Who to Call     For medical emergencies, please call 911.  For non-urgent questions about your medical care, please call your primary care provider or clinic, [phone]  For questions related to your surgery, please call your surgery clinic        Attending Provider     Provider Specialty    Jayne Smith MD OB/Gyn       Primary Care Provider Office Phone # Fax #    Zackery Bowen [phone]      After Care Instructions     Activity       Review discharge instructions            Diet       Resume previous diet            Discharge Instructions       You can take Ibuprofen 600 mg every 6 hours on a scheduled basis for 1-2 weeks. After this would only use Tylenol given your history of stomach ulcer.            Discharge Instructions - Postpartum visit       Schedule postpartum visit with your provider and return to clinic in 6 weeks.                  Follow-up Appointments     Follow Up and recommended labs and tests       Follow up in 1 week for a mood check.                  Further instructions from your care team        Postop  Birth Instructions    Activity       Do not lift more than 10 pounds for 6 weeks after surgery.  Ask family and friends for help when you need it.    No driving until you have stopped taking your pain medications (usually two weeks after surgery).    No heavy exercise or activity for 6 weeks.  Don't do anything that will put a strain on your surgery site.    Don't strain when using the toilet.  Your care team may prescribe a stool softener if you have problems with your bowel movements.     To care for your incision:       Keep the incision clean and dry.    Do not soak your incision in water. No swimming or hot tubs until it has fully healed. You may soak in the bathtub if the water level is below your incision.    Do not use peroxide, gel, cream, lotion, or ointment on your incision.    Adjust your clothes to avoid pressure on your surgery site (check the elastic in your underwear for example).     You may see a small amount of clear or pink drainage and this is normal.  Check with your health care provider:       If the drainage increases or has an odor.    If the incision reddens, you have swelling, or develop a rash.    If you have increased pain and the medicine we prescribed doesn't help.    If you have a fever above 100.4 F (38 C) with or without chills when placing thermometer under your tongue.   The area around your incision (surgery wound), will feel numb.  This is normal. The numbness should go away in less than a year.     Keep your hands clean:  Always wash your hands before touching your incision (surgery wound). This helps reduce your risk of infection. If your hands aren't dirty, you may use an alcohol hand-rub to clean your hands. Keep your nails clean and short.    Call your healthcare provider if you have any of these symptoms:       You soak a sanitary pad with blood within 1 hour, or you see blood clots larger than a golf ball.    Bleeding that lasts more than 6  "weeks.    Vaginal discharge that smells bad.    Severe pain, cramping or tenderness in your lower belly area.    A need to urinate more frequently (use the toilet more often), more urgently (use the toilet very quickly), or it burns when you urinate.    Nausea and vomiting.    Redness, swelling or pain around a vein in your leg.    Problems breastfeeding or a red or painful area on your breast.    Chest pain and cough or are gasping for air.    Problems with coping with sadness, anxiety or depression. If you have concerns about hurting yourself or the baby, call your provider immediately.      You have questions or concerns after you return home.                  Pending Results     No orders found from 7/21/2018 to 7/24/2018.            Statement of Approval     Ordered          07/26/18 0827  I have reviewed and agree with all the recommendations and orders detailed in this document.  EFFECTIVE NOW     Approved and electronically signed by:  Thu Toledo MD             Admission Information     Date & Time Provider Department Dept. Phone    7/23/2018 Jayne Smith MD [facility] [phone]      Your Vitals Were     Blood Pressure Pulse Temperature Respirations Height Weight    114/68 106 98.7  F (37.1  C) (Oral) 16 1.702 m (5' 7\") 72.6 kg (160 lb)    Last Period Pulse Oximetry BMI (Body Mass Index)             10/30/2017 97% 25.06 kg/m2         [employer] Information     [employer] lets you send messages to your doctor, view your test results, renew your prescriptions, schedule appointments and more. To sign up, go to [email or web address] . Click on \"Log in\" on the left side of the screen, which will take you to the Welcome page. Then click on \"Sign up Now\" on the right side of the page.     You will be asked to enter the access code listed below, as well as some personal information. Please follow the directions to create your username and password.     Your access code is: [email or web address]  Expires: " 2018  6:33 PM     Your access code will  in 90 days. If you need help or a new code, please call your [facility] clinic or [phone].        Care EveryWhere ID     This is your Care EveryWhere ID. This could be used by other organizations to access your [facility] medical records  [id number]        Equal Access to Services     KIMANITERE KEN : Hadii rey peterson hadrojelioo Somikeyali, waaxda luqadaha, qaybta kaalmada gary, sivan joesuellengaston andres . [facility] [phone].    ATENCIÓN: Si habla español, tiene a echavarria disposición servicios gratuitos de asistencia lingüística. Lisaame al [phone].    We comply with applicable federal civil rights laws and [place] laws. We do not discriminate on the basis of race, color, national origin, age, disability, sex, sexual orientation, or gender identity.               Review of your medicines      START taking        Dose / Directions    Capsaicin-Menthol 0.025-5 % Ptch   Used for:  S/P  section        Dose:  1 patch   Externally apply 1 patch topically daily as needed   Quantity:  2 patch   Refills:  0       ferrous sulfate 325 (65 Fe) MG tablet   Commonly known as:  IRON   Used for:  Other iron deficiency anemia        Dose:  325 mg   Take 1 tablet (325 mg) by mouth daily (with breakfast)   Quantity:  90 tablet   Refills:  0       ibuprofen 600 MG tablet   Commonly known as:  ADVIL/MOTRIN   Used for:  S/P  section        Dose:  600 mg   Take 1 tablet (600 mg) by mouth every 6 hours as needed for moderate pain   Quantity:  50 tablet   Refills:  0       Lidocaine HCl 3 % Gel   Used for:  S/P  section        Externally apply topically daily as needed   Quantity:  30 mL   Refills:  0       oxyCODONE-acetaminophen 5-325 MG per tablet   Commonly known as:  PERCOCET   Used for:  S/P  section        Dose:  1-2 tablet   Take 1-2 tablets by mouth every 4 hours as needed for moderate to severe pain   Quantity:  20 tablet   Refills:   0       senna-docusate 8.6-50 MG per tablet   Commonly known as:  SENOKOT-S;PERICOLACE   Used for:  S/P  section        Dose:  2 tablet   Take 2 tablets by mouth 2 times daily as needed for constipation   Quantity:  60 tablet   Refills:  0         CONTINUE these medicines which have NOT CHANGED        Dose / Directions    calcium-vitamin D-vitamin K 500-500-40 MG-UNT-MCG Chew   Commonly known as:  VIACTIV        Dose:  1 tablet   Take 1 tablet by mouth daily.   Refills:  0       * cyclobenzaprine 10 MG tablet   Commonly known as:  FLEXERIL   Used for:  Back pain, unspecified back location, unspecified back pain laterality, unspecified chronicity        Dose:  10 mg   Take 1 tablet (10 mg) by mouth 3 times daily as needed for muscle spasms   Quantity:  42 tablet   Refills:  3       * cyclobenzaprine 5 MG tablet   Commonly known as:  FLEXERIL   Used for:  Bill-Danlos syndrome        Dose:  5 mg   Take 1 tablet (5 mg) by mouth nightly as needed for muscle spasms   Quantity:  20 tablet   Refills:  0       PARoxetine 10 MG tablet   Commonly known as:  PAXIL   Used for:  Generalized anxiety disorder        Dose:  10 mg   Take 1 tablet (10 mg) by mouth every morning   Quantity:  60 tablet   Refills:  1       prenatal multivitamin plus iron 27-0.8 MG Tabs per tablet        Dose:  1 tablet   Take 1 tablet by mouth daily   Refills:  0       RITALIN LA 10 MG Cp24   Generic drug:  methylphenidate        Dose:  30 mg   Take 30 mg by mouth 2 tabs of 10mg in A.M and 1  10mg in P.M   Refills:  0       sildenafil 20 MG tablet   Commonly known as:  REVATIO   Used for:  Vascular abnormality, PAD (peripheral artery disease) (H)        Dose:  20 mg   Take 1 tablet by mouth 3 times daily.   Quantity:  90 tablet   Refills:  3       VITAMIN D3 PO        Dose:  1000 Units   Take 1,000 Units by mouth daily.   Refills:  0       * Notice:  This list has 2 medication(s) that are the same as other medications prescribed for you. Read  "the directions carefully, and ask your doctor or other care provider to review them with you.      STOP taking     acetaminophen 500 MG Caps           citalopram 20 MG tablet   Commonly known as:  celeXA           oseltamivir 75 MG capsule   Commonly known as:  TAMIFLU                Where to get your medicines      These medications were sent to [facility] [place], [place] - [address]  [address], [facility] [address]     Phone:  [phone]     Capsaicin-Menthol 0.025-5 % Ptch    ferrous sulfate 325 (65 Fe) MG tablet    ibuprofen 600 MG tablet    Lidocaine HCl 3 % Gel    PARoxetine 10 MG tablet    senna-docusate 8.6-50 MG per tablet         Some of these will need a paper prescription and others can be bought over the counter. Ask your nurse if you have questions.     Bring a paper prescription for each of these medications     oxyCODONE-acetaminophen 5-325 MG per tablet                Protect others around you: Learn how to safely use, store and throw away your medicines at www.disposemymeds.org.        Information about your nerve block     Today you received a block to numb the nerves near your surgery site.    This is a block using local anesthetic or \"numbing\" medication injected around the nerves to anesthetize or \"numb\" the area supplied by those nerves. This block is injected into the muscle layer near your surgical site. The type of anesthesia (Exparel) your anesthesia team used to numb your abdomen may give you relief for up to 72 hours.     Diet: There are no diet restrictions, but you should drink plenty of fluids, unless you are on a fluid-restricted diet.     Activity: If your surgical site is an arm or leg you should be careful with your affected limb, since it is possible to injure your limb without being aware of it due to the numbing. Until full feeling returns, you should guard against bumping or hitting your limb, and avoid extreme hot or cold temperatures on " the skin.    Pain Medication: As the block wears off, the feeling will return as a tingling or prickly sensation near your surgical site. You will experience more discomfort from your incisions as the feeling returns. You may want to take a pain pill (a narcotic or Tylenol if this was prescribed by your surgeon) when you start to experience mild pain, before the pain becomes more severe. If your pain medications do not control your pain, you should notify your surgeon. If you are taking narcotics for pain management, do not drink alcohol, drive a car, or perform hazardous activities.  If you have questions or concerns you may call your surgeon at the number provided with your discharge instructions.     Call your surgeon if you experience blurry vision, ringing in the ears or metallic taste in your mouth.         Information about OPIOIDS     PRESCRIPTION OPIOIDS: WHAT YOU NEED TO KNOW   We gave you an opioid (narcotic) pain medicine. It is important to manage your pain, but opioids are not always the best choice. You should first try all the other options your care team gave you. Take this medicine for as short a time (and as few doses) as possible.     These medicines have risks:    DO NOT drive when on new or higher doses of pain medicine. These medicines can affect your alertness and reaction times, and you could be arrested for driving under the influence (DUI). If you need to use opioids long-term, talk to your care team about driving.    DO NOT operate heave machinery    DO NOT do any other dangerous activities while taking these medicines.     DO NOT drink any alcohol while taking these medicines.      If the opioid prescribed includes acetaminophen, DO NOT take with any other medicines that contain acetaminophen. Read all labels carefully. Look for the word  acetaminophen  or  Tylenol.  Ask your pharmacist if you have questions or are unsure.    You can get addicted to pain medicines, especially if you have  a history of addiction (chemical, alcohol or substance dependence). Talk to your care team about ways to reduce this risk.    Store your pills in a secure place, locked if possible. We will not replace any lost or stolen medicine. If you don t finish your medicine, please throw away (dispose) as directed by your pharmacist. The [place] Pollution Control Agency has more information about safe disposal: https://www.pca.Count includes the [facility].mn.us/living-green/managing-unwanted-medications.     All opioids tend to cause constipation. Drink plenty of water and eat foods that have a lot of fiber, such as fruits, vegetables, prune juice, apple juice and high-fiber cereal. Take a laxative (Miralax, milk of magnesia, Colace, Senna) if you don t move your bowels at least every other day.              Medication List: This is a list of all your medications and when to take them. Check marks below indicate your daily home schedule. Keep this list as a reference.      Medications           Morning Afternoon Evening Bedtime As Needed    calcium-vitamin D-vitamin K 500-500-40 MG-UNT-MCG Chew   Commonly known as:  VIACTIV   Take 1 tablet by mouth daily.                                Capsaicin-Menthol 0.025-5 % Ptch   Externally apply 1 patch topically daily as needed                                * cyclobenzaprine 10 MG tablet   Commonly known as:  FLEXERIL   Take 1 tablet (10 mg) by mouth 3 times daily as needed for muscle spasms                                * cyclobenzaprine 5 MG tablet   Commonly known as:  FLEXERIL   Take 1 tablet (5 mg) by mouth nightly as needed for muscle spasms                                ferrous sulfate 325 (65 Fe) MG tablet   Commonly known as:  IRON   Take 1 tablet (325 mg) by mouth daily (with breakfast)                                ibuprofen 600 MG tablet   Commonly known as:  ADVIL/MOTRIN   Take 1 tablet (600 mg) by mouth every 6 hours as needed for moderate pain   Last time this was given:  600 mg on  7/26/2018 10:30 AM                                Lidocaine HCl 3 % Gel   Externally apply topically daily as needed                                oxyCODONE-acetaminophen 5-325 MG per tablet   Commonly known as:  PERCOCET   Take 1-2 tablets by mouth every 4 hours as needed for moderate to severe pain   Last time this was given:  2 tablets on 7/26/2018  7:43 AM                                PARoxetine 10 MG tablet   Commonly known as:  PAXIL   Take 1 tablet (10 mg) by mouth every morning                                prenatal multivitamin plus iron 27-0.8 MG Tabs per tablet   Take 1 tablet by mouth daily                                RITALIN LA 10 MG Cp24   Take 30 mg by mouth 2 tabs of 10mg in A.M and 1  10mg in P.M   Generic drug:  methylphenidate                                senna-docusate 8.6-50 MG per tablet   Commonly known as:  SENOKOT-S;PERICOLACE   Take 2 tablets by mouth 2 times daily as needed for constipation   Last time this was given:  2 tablets on 7/26/2018  9:03 AM                                sildenafil 20 MG tablet   Commonly known as:  REVATIO   Take 1 tablet by mouth 3 times daily.   Last time this was given:  20 mg on 7/26/2018  7:43 AM                                VITAMIN D3 PO   Take 1,000 Units by mouth daily.                                * Notice:  This list has 2 medication(s) that are the same as other medications prescribed for you. Read the directions carefully, and ask your doctor or other care provider to review them with you.

## 2018-07-24 LAB — HGB BLD-MCNC: 9.3 G/DL (ref 11.7–15.7)

## 2018-07-24 PROCEDURE — 85018 HEMOGLOBIN: CPT | Performed by: OBSTETRICS & GYNECOLOGY

## 2018-07-24 PROCEDURE — 36415 COLL VENOUS BLD VENIPUNCTURE: CPT | Performed by: OBSTETRICS & GYNECOLOGY

## 2018-07-24 PROCEDURE — 12000030 ZZH R&B OB INTERMEDIATE UMMC

## 2018-07-24 PROCEDURE — 25000132 ZZH RX MED GY IP 250 OP 250 PS 637: Performed by: STUDENT IN AN ORGANIZED HEALTH CARE EDUCATION/TRAINING PROGRAM

## 2018-07-24 RX ORDER — OXYCODONE HYDROCHLORIDE 5 MG/1
5-10 TABLET ORAL
Qty: 18 TABLET | Refills: 0 | Status: SHIPPED | OUTPATIENT
Start: 2018-07-24 | End: 2018-07-25

## 2018-07-24 RX ORDER — AMOXICILLIN 250 MG
2 CAPSULE ORAL 2 TIMES DAILY PRN
Qty: 60 TABLET | Refills: 0 | Status: SHIPPED | OUTPATIENT
Start: 2018-07-24 | End: 2020-11-05

## 2018-07-24 RX ORDER — PAROXETINE 10 MG/1
10 TABLET, FILM COATED ORAL EVERY MORNING
Qty: 60 TABLET | Refills: 1 | Status: ON HOLD | OUTPATIENT
Start: 2018-07-24 | End: 2022-05-16

## 2018-07-24 RX ORDER — FERROUS SULFATE 325(65) MG
325 TABLET ORAL
Qty: 90 TABLET | Refills: 0 | Status: SHIPPED | OUTPATIENT
Start: 2018-07-24 | End: 2020-11-05

## 2018-07-24 RX ORDER — LIDOCAINE 4 G/G
1-2 PATCH TOPICAL
Status: DISCONTINUED | OUTPATIENT
Start: 2018-07-24 | End: 2018-07-26 | Stop reason: HOSPADM

## 2018-07-24 RX ORDER — ACETAMINOPHEN 325 MG/1
975 TABLET ORAL EVERY 8 HOURS
Qty: 100 TABLET | Refills: 0 | Status: SHIPPED | OUTPATIENT
Start: 2018-07-24 | End: 2018-07-25

## 2018-07-24 RX ADMIN — OXYCODONE HYDROCHLORIDE 5 MG: 5 TABLET ORAL at 01:04

## 2018-07-24 RX ADMIN — ACETAMINOPHEN 975 MG: 325 TABLET, FILM COATED ORAL at 00:32

## 2018-07-24 RX ADMIN — SENNOSIDES AND DOCUSATE SODIUM 1 TABLET: 8.6; 5 TABLET ORAL at 08:16

## 2018-07-24 RX ADMIN — SILDENAFIL 20 MG: 20 TABLET, FILM COATED ORAL at 08:17

## 2018-07-24 RX ADMIN — SIMETHICONE CHEW TAB 80 MG 80 MG: 80 TABLET ORAL at 08:16

## 2018-07-24 RX ADMIN — SILDENAFIL 20 MG: 20 TABLET, FILM COATED ORAL at 20:24

## 2018-07-24 RX ADMIN — OXYCODONE HYDROCHLORIDE 10 MG: 5 TABLET ORAL at 14:11

## 2018-07-24 RX ADMIN — SIMETHICONE CHEW TAB 80 MG 80 MG: 80 TABLET ORAL at 14:11

## 2018-07-24 RX ADMIN — SIMETHICONE CHEW TAB 80 MG 80 MG: 80 TABLET ORAL at 00:32

## 2018-07-24 RX ADMIN — OXYCODONE HYDROCHLORIDE 10 MG: 5 TABLET ORAL at 17:30

## 2018-07-24 RX ADMIN — ACETAMINOPHEN 975 MG: 325 TABLET, FILM COATED ORAL at 16:24

## 2018-07-24 RX ADMIN — ACETAMINOPHEN 975 MG: 325 TABLET, FILM COATED ORAL at 08:17

## 2018-07-24 RX ADMIN — OXYCODONE HYDROCHLORIDE 10 MG: 5 TABLET ORAL at 11:18

## 2018-07-24 RX ADMIN — OXYCODONE HYDROCHLORIDE 10 MG: 5 TABLET ORAL at 20:24

## 2018-07-24 RX ADMIN — OXYCODONE HYDROCHLORIDE 5 MG: 5 TABLET ORAL at 05:15

## 2018-07-24 RX ADMIN — OXYCODONE HYDROCHLORIDE 10 MG: 5 TABLET ORAL at 08:16

## 2018-07-24 RX ADMIN — SIMETHICONE CHEW TAB 80 MG 80 MG: 80 TABLET ORAL at 20:24

## 2018-07-24 RX ADMIN — LIDOCAINE 2 PATCH: 560 PATCH PERCUTANEOUS; TOPICAL; TRANSDERMAL at 20:19

## 2018-07-24 RX ADMIN — OXYCODONE HYDROCHLORIDE 5 MG: 5 TABLET ORAL at 04:37

## 2018-07-24 RX ADMIN — OXYCODONE HYDROCHLORIDE 10 MG: 5 TABLET ORAL at 23:29

## 2018-07-24 RX ADMIN — OXYCODONE HYDROCHLORIDE 5 MG: 5 TABLET ORAL at 00:32

## 2018-07-24 NOTE — PLAN OF CARE
Problem: Patient Care Overview  Goal: Plan of Care/Patient Progress Review  Outcome: No Change  Data: Vital signs within normal limits. Postpartum checks within normal limits - see flow record. Patient eating and drinking normally. Patient able to empty bladder independently 1x after presley removal at 0130 and is up ambulating with standy by assist. No apparent signs of infection.   Action: Patient medicated during the shift for incisional soreness. Ice applied for relief.   Response: Positive attachment behaviors observed with infant. Breastfeeding great!  Will continue to monitor and provide support.

## 2018-07-24 NOTE — LACTATION NOTE
This note was copied from a baby's chart.  Consult for history of low milk supply.    Anjelica is a 23 year old  who delivered her baby at 38.0 weeks via repeat  on 18 at 1210. Her medical history includes history of low milk supply, Ehler's-Danlos Syndrome, peripheral artery disease, vascular spasms, ADD and ovarian cyst. Her medications were reviewed with [facility]'s Medications and Mother's Milk: Gabapentin- L2, probably compatible, Sildenfil-L3, probably compatible, Paxil- L2, compatible, Methylphenidate (Ritalin)- L2, probably compatible.   Her breasts are symmetrical and slightly tubular with bilateral everted nipples. She has been breastfeeding on cue and also attempting hand expression after feedings. She feels comfortable with hand expression, but does need assistance with latching baby. Baby was cueing, so I was able to assist her with getting an asymmetric latch in the cross cradle position. She denied discomfort.    Anjelica reports a history of low milk supply with her first daughter and reports she, after attempting breastfeeding, ended up exclusively formula feeding. She plans to do both breastfeeding and formula feeding and is requesting to start supplementing now. She understands the benefits of exclusive breastfeeding, but is choosing to supplement with formula. She has questions about the best way to supplement while baby is learning to breastfeed. She would also like to start pumping to help bring in her milk supply. She has a breast pump to use at home.     Reviewed benefits of exclusive breastfeeding, breastfeeding positions, early feeding cues, benefits of feeding on cue, benefits of hand expression and pumping to support milk supply due to history of low milk supply and desire to supplement baby, alternative feeding methods and inpatient lactation support. I encouraged Anjelica to hand express or pump each time she supplements and to breastfeed before supplementing to help support her  milk supply.     Plan: continue to breastfeed on demand. Family plans to supplement via finger feeding and were shown how to do this. A Symphony breast pump was ordered; please show her how to use this afternoon.

## 2018-07-24 NOTE — PROGRESS NOTES
Post Partum Progress Note  PPD#1    Subjective:  She is resting comfortably in bed this morning. She had pain with ambulating to the bathroom this morning, but is improved now with pain medications. States after presley removal it took her awhile to void and she only voided a small amount. She is tolerating PO intake. Lochia present and is similar to heavy menses. She is ambulating without dizziness or difficulty.  She denies headache, changes in vision, nausea/vomiting, chest pain, shortness of breath, RUQ pain, or worsening edema.  Plans to breastfeed.    Objective:  Vitals:    18 1915 18 0030 18 0434   BP: 116/59 113/62 115/61 113/69   Pulse:       Resp: 18 16 16 16   Temp: 98.4  F (36.9  C) 98.2  F (36.8  C) 98  F (36.7  C) 98  F (36.7  C)   TempSrc: Oral Oral Oral Oral   SpO2: 97% 97% 99%    Weight:       Height:           General: NAD, resting comfortably  CV: Regular rate, well perfused.   Pulm: Normal respiratory effort.  Abd: Deferred as patient was breastfeeding  Incision: Bandage in place  Ext: No lower extremity edema bilaterally. No calf tenderness.    Assessment/Plan:  Anjelica Holloway is a 23 year old  female who is POD#1 s/p RLTCS.    - Encourage routine post-operative goals including ambulation and incentive spirometry  - PNC: Rh positive. Rubella immune. No intervention indicated.  - Pain: controlled on oral medications  - Heme: Hgb 9.8>>AM Hgb pending. Will discharge home with iron.  - GI: continue anti-emetics and stool softeners as needed.  - : s/p presley removal, monitor voiding.   - Infant: Stable in room with mom  - Feeding: Plans on breastfeeding.  - BC: Plans on IUD at 6w PP visit  - Mood: Anxiety, weaned off celexa 2 weeks prior to delivery. Desires to restart paxil 10 mg daily.     Anticipate discharge to home on POD#3.    # Pain Assessment:  Current Pain Score 2018   Patient currently in pain? yes   Pain score (0-10) -   Pain location  Incision   Pain descriptors Sore   - Anjelica is experiencing pain due to  section. Pain management was discussed and the plan was created in a collaborative fashion.  Anjelica's response to the current recommendations: engaged  - Please see the plan for pain management as documented above    Etelvina Yoo MD  Ob/Gyn PGY-2  18 6:39 AM     Hemoglobin   Date Value Ref Range Status   2018 9.3 (L) 11.7 - 15.7 g/dL Final   2018 9.8 (L) 11.7 - 15.7 g/dL Final     Appreciate Dr. Yoo's note above, patient also seen and examined by me.  At time of my visit patient was voiding without difficulty.   I agree with the note above.   Little Hawk MD

## 2018-07-24 NOTE — PROGRESS NOTES
"Post  Anesthesia Follow Up Note    Patient: Anjelica Holloway    Patient location: Postpartum floor.    Chief complaint: Acute postoperative pain managment    Procedure(s) Performed:   SECTION    Anesthesia type: Spinal Block  and transversus abdominus plane (TAP) nerve block        Subjective:   The patient reports good pain control.  The patient denies weakness, paresthesia, numbness or tingling lips, tinnitus, metallic taste, difficulties breathing or voiding, nausea or vomiting. She is able to ambulate and tolerates regular diet.        Objective:  Respiratory Function (RR / SpO2 / Airway Patency): Satisfactory    Cardiac Function (HR / Rhythm / BP): Satisfactory    Strength and sensation lower extremities: Strength 5/5 and grossly symmetric bilateral LE    Site of spinal/epidural insertion: clean and intact,  tenderness, swelling or erythema    Last Vitals: /69  Pulse 79  Temp 36.7  C (98  F) (Oral)  Resp 16  Ht 1.702 m (5' 7\")  Wt 72.6 kg (160 lb)  LMP 10/30/2017  SpO2 99%  Breastfeeding? Unknown  BMI 25.06 kg/m2      Assessment and Plan:   Anjelica Holloway is a 23 year old female POD #1 s/p   SECTION with Spinal bupivacaine and  morphine and single shot TAP nerve block injections with liposome bupivacaine (Exparel) long-acting 1.3% 20mL given on  for postoperative analgesia.  Pt is ambulating without difficulty, no weakness or paresthesias.  No evidence of adverse side effects associated with spinal and nerve block injections. Pt is receiving adequate incisional pain control.  Anticipate up to 72 hours of incisional pain control.  Anticipate patient will require opioid/nonopioid analgesics for visceral and muscle pain not controlled with local anesthetic.      - discussed plan with attending anesthesiologist    Javier Vila MD  Ca-2    "

## 2018-07-24 NOTE — PLAN OF CARE
Problem: Patient Care Overview  Goal: Plan of Care/Patient Progress Review  Outcome: Improving  VSS and postpartum assessments WDL.  Oriented to room and unit routines and welcome folder.  Bonding well with infant.  Breastfeeding on cue with some assist for positioning and latching, infant latches very well.  Provided education and assistance with hand expression, was easily able to express breastmilk and demostrated spoon feeding expressed colostrum to infant.  Pt and  asking many questions about breastmilk vs formula and wondering if infant is getting enough to eat, stating that their first child lost too much weight in the hospital and had to supplement with formula and she didn't breastfeed after that.  Provided education on how to tell if infant is getting enough to eat, benefits of breastfeeding and risks of formula feeding.  Also placed lactation consult.  Pain managed with tylenol and oxycodone per MAR (pt unable to take NSAIDS).  Incisional dressing clean, dry and intact.  Costa catheter collecting adequate amounts of clear urine.   and many family visitors present and supportive.  Pt resting after visitors, cares and feeding.  Will continue with postpartum cares and education per plan of care.

## 2018-07-24 NOTE — PROVIDER NOTIFICATION
07/23/18 2246   Provider Notification   Provider Name/Title G2 Dr Yoo   Method of Notification Electronic Page   Request Evaluate-Remote   Notification Reason Medication Request   Pt states that she takes her Sildenafil BID not TID.  Please update order. Thanks!

## 2018-07-24 NOTE — PLAN OF CARE
Problem: Patient Care Overview  Goal: Plan of Care/Patient Progress Review  Outcome: Improving  Vital signs stable and postpartum checks within normal limits. Pt is up voiding with assist getting out of bed to the bathroom.  Pt showered with assist from spouse. Complains of gas discomfort and sharp pain at incision site. Pt is using the abdominal binder, which help per pt. Pt is breastfeeding well with minimal assist and want to give baby some formula too. So FOB is supplementing baby with formula using finger feeding method and independent with that. Pt medicated with Tylenol and Roxicodone when it is due.  Encouraged pt to ambulate on the hallway and to pump after breastfeeding occasionally for extra stimulation due to past history of low breast milk supply. Checked latch and assisted with latching.  Continue cares and assist as needed.

## 2018-07-25 PROCEDURE — 12000028 ZZH R&B OB UMMC

## 2018-07-25 PROCEDURE — 25000132 ZZH RX MED GY IP 250 OP 250 PS 637: Performed by: STUDENT IN AN ORGANIZED HEALTH CARE EDUCATION/TRAINING PROGRAM

## 2018-07-25 RX ORDER — OXYCODONE AND ACETAMINOPHEN 5; 325 MG/1; MG/1
1-2 TABLET ORAL EVERY 4 HOURS PRN
Qty: 20 TABLET | Refills: 0 | Status: SHIPPED | OUTPATIENT
Start: 2018-07-26 | End: 2020-11-05

## 2018-07-25 RX ORDER — OXYCODONE HYDROCHLORIDE 5 MG/1
5-10 TABLET ORAL ONCE
Status: COMPLETED | OUTPATIENT
Start: 2018-07-25 | End: 2018-07-25

## 2018-07-25 RX ORDER — IBUPROFEN 600 MG/1
600 TABLET, FILM COATED ORAL EVERY 6 HOURS PRN
Status: DISCONTINUED | OUTPATIENT
Start: 2018-07-25 | End: 2018-07-26 | Stop reason: HOSPADM

## 2018-07-25 RX ORDER — KETOROLAC TROMETHAMINE 30 MG/ML
30 INJECTION, SOLUTION INTRAMUSCULAR; INTRAVENOUS ONCE
Status: DISCONTINUED | OUTPATIENT
Start: 2018-07-25 | End: 2018-07-25

## 2018-07-25 RX ORDER — HYDROXYZINE HYDROCHLORIDE 25 MG/1
25-50 TABLET, FILM COATED ORAL 3 TIMES DAILY PRN
Status: DISCONTINUED | OUTPATIENT
Start: 2018-07-25 | End: 2018-07-26 | Stop reason: HOSPADM

## 2018-07-25 RX ORDER — OXYCODONE AND ACETAMINOPHEN 5; 325 MG/1; MG/1
1-2 TABLET ORAL EVERY 4 HOURS PRN
Status: DISCONTINUED | OUTPATIENT
Start: 2018-07-26 | End: 2018-07-26 | Stop reason: HOSPADM

## 2018-07-25 RX ORDER — IBUPROFEN 600 MG/1
600 TABLET, FILM COATED ORAL EVERY 6 HOURS PRN
Qty: 50 TABLET | Refills: 0 | Status: ON HOLD | OUTPATIENT
Start: 2018-07-25 | End: 2019-07-04

## 2018-07-25 RX ADMIN — OXYCODONE HYDROCHLORIDE 10 MG: 5 TABLET ORAL at 09:16

## 2018-07-25 RX ADMIN — OXYCODONE HYDROCHLORIDE 10 MG: 5 TABLET ORAL at 02:38

## 2018-07-25 RX ADMIN — SILDENAFIL 20 MG: 20 TABLET, FILM COATED ORAL at 20:00

## 2018-07-25 RX ADMIN — OXYCODONE HYDROCHLORIDE 10 MG: 5 TABLET ORAL at 20:00

## 2018-07-25 RX ADMIN — SENNOSIDES AND DOCUSATE SODIUM 2 TABLET: 8.6; 5 TABLET ORAL at 09:17

## 2018-07-25 RX ADMIN — ACETAMINOPHEN 975 MG: 325 TABLET, FILM COATED ORAL at 09:16

## 2018-07-25 RX ADMIN — OXYCODONE HYDROCHLORIDE 5 MG: 5 TABLET ORAL at 12:51

## 2018-07-25 RX ADMIN — ACETAMINOPHEN 975 MG: 325 TABLET, FILM COATED ORAL at 17:05

## 2018-07-25 RX ADMIN — LIDOCAINE 2 PATCH: 560 PATCH PERCUTANEOUS; TOPICAL; TRANSDERMAL at 20:00

## 2018-07-25 RX ADMIN — OXYCODONE HYDROCHLORIDE 10 MG: 5 TABLET ORAL at 17:05

## 2018-07-25 RX ADMIN — OXYCODONE HYDROCHLORIDE AND ACETAMINOPHEN 2 TABLET: 5; 325 TABLET ORAL at 23:03

## 2018-07-25 RX ADMIN — SILDENAFIL 20 MG: 20 TABLET, FILM COATED ORAL at 09:17

## 2018-07-25 RX ADMIN — IBUPROFEN 600 MG: 600 TABLET ORAL at 20:00

## 2018-07-25 RX ADMIN — ACETAMINOPHEN 975 MG: 325 TABLET, FILM COATED ORAL at 00:29

## 2018-07-25 RX ADMIN — OXYCODONE HYDROCHLORIDE 5 MG: 5 TABLET ORAL at 14:05

## 2018-07-25 RX ADMIN — SENNOSIDES AND DOCUSATE SODIUM 2 TABLET: 8.6; 5 TABLET ORAL at 20:00

## 2018-07-25 RX ADMIN — OXYCODONE HYDROCHLORIDE 10 MG: 5 TABLET ORAL at 05:27

## 2018-07-25 ASSESSMENT — ACTIVITIES OF DAILY LIVING (ADL)
BATHING: 0-->INDEPENDENT
RETIRED_COMMUNICATION: 0-->UNDERSTANDS/COMMUNICATES WITHOUT DIFFICULTY
COGNITION: 0 - NO COGNITION ISSUES REPORTED
TOILETING: 0-->INDEPENDENT
FALL_HISTORY_WITHIN_LAST_SIX_MONTHS: NO
DRESS: 0-->INDEPENDENT
AMBULATION: 0-->INDEPENDENT
RETIRED_EATING: 0-->INDEPENDENT
SWALLOWING: 0-->SWALLOWS FOODS/LIQUIDS WITHOUT DIFFICULTY
TRANSFERRING: 0-->INDEPENDENT

## 2018-07-25 NOTE — PROVIDER NOTIFICATION
07/25/18 1845   Provider Notification   Provider Name/Title Dr Yoo   Method of Notification Electronic Page   Request Evaluate in Person   Notification Reason Status Update   FYI: Pt is still having increased discomfort, worried about going home being in this much pain. Would like to talk to someone about this

## 2018-07-25 NOTE — PLAN OF CARE
Problem: Patient Care Overview  Goal: Plan of Care/Patient Progress Review  Outcome: No Change  Patient has been stable this shift. Having pain when standing, but states pain is better when not moving. Using hot packs and Tylenol and Oxycodone. Patient denies any needs at this time. Continue with plan of care.

## 2018-07-25 NOTE — LACTATION NOTE
This note was copied from a baby's chart.  Follow up consult    Anjelica has been breastfeeding on demand and supplementing with formula via finger feeding per parents choice after breastfeeding. She has not been pumping very often. Anjelica feels like her breasts are filling and shares that her breasts felt hard this morning. She has been breastfeeding with the support of her bedside RNs.     Reviewed symptoms of engorgement and tips for management (frequent breast emptying, breast massage/hand expression to soften breast, ice to breasts as tolerated between feedings) and indications for supplementation. Recommended that Anjelica breastfeed on demand and if her breasts still feel full, pump or hand express and feed that to baby instead of formula.    Plan: continue to breastfeed on demand. Recommend pumping or hand expression after feedings if breasts still feel full. Family may choose to continue supplementing with formula as this is their preference.

## 2018-07-25 NOTE — PLAN OF CARE
Problem: Patient Care Overview  Goal: Plan of Care/Patient Progress Review  Outcome: Improving  VSS. Postpartum checks WDL. Incision LYNNETTE with steri-strips- no s/s of infection. Pain better managed with lidocaine patches in place. Also taking tylenol and oxycodone with relief. Up independently, voiding without difficulty. Breastfeeding with minimal assist. Also supplementing with formula per request. Significant other supportive at the bedside. Will continue POC.

## 2018-07-25 NOTE — PROGRESS NOTES
Post Partum Progress Note  PPD#2    Subjective:  She is resting comfortably in bed this morning. Had poor pain control yesterday evening that improved with Lidocaine patches. She is voiding spontaneously. She is tolerating PO intake. Lochia present and is similar to heavy menses. She is ambulating without dizziness or difficulty. Has not passed flatus yet, but feels like she has too. She denies headache, changes in vision, nausea/vomiting, chest pain, shortness of breath, RUQ pain, or worsening edema.  Plans to breastfeed.    Objective:  Vitals:    18 0817 18 1035 18 1118 18 1224   BP:    115/64   BP Location:       Pulse:    103   Resp:  16 18   Temp:    99.2  F (37.3  C)   TempSrc:    Axillary   SpO2: 98% 98% 97% 97%   Weight:       Height:           General: NAD, resting comfortably  CV: Regular rate, well perfused.   Pulm: Normal respiratory effort.  Abd: Soft, non-distended, appropriately tender  Incision: Steri-strips in place, c/d/i  Ext: No lower extremity edema bilaterally. No calf tenderness.    Assessment/Plan:  Anjelica Holloway is a 23 year old  female who is POD#2 s/p RLTCS.    - Encourage routine post-operative goals including ambulation and incentive spirometry  - PNC: Rh positive. Rubella immune. No intervention indicated.  - Pain: controlled on oral medications and Lidocaine patches  - Heme: Hgb 9.8>>9.3. Will discharge home with iron.  - GI: continue anti-emetics and stool softeners as needed. Encouraged ambulation today for flatus.   - : s/p presley removal, voiding spontaneously  - Infant: Stable in room with mom  - Feeding: Plans on breastfeeding.  - BC: Plans on IUD at 6w PP visit  - Mood: Anxiety, weaned off celexa 2 weeks prior to delivery. Desires to restart paxil 10 mg daily.     Anticipate discharge to home on POD#3.    # Pain Assessment:  Current Pain Score 2018   Patient currently in pain? yes   Pain score (0-10) -   Pain location Incision    Pain descriptors Sore   - Anjelica is experiencing pain due to  section. Pain management was discussed and the plan was created in a collaborative fashion.  Anjelica's response to the current recommendations: engaged  - Please see the plan for pain management as documented above    Etelvina Yoo MD  Ob/Gyn PGY-2  18 6:47 AM     [facility] staff:  Appreciate note by Dr. Yoo.  I have seen and examined the patient without the resident. I have reviewed, edited, and agree with the note.      Zulma Chavez MD, FACOG  2018  10:52 AM

## 2018-07-25 NOTE — PLAN OF CARE
Problem: Patient Care Overview  Goal: Plan of Care/Patient Progress Review  Outcome: No Change  VSS. Postpartum check WDL. Pain managed with oral meds and lidocaine patch. Lidocaine patch was removed at 0900 and can be placed this evening after being off for 12 hours. Up ad angela. Tolerating regular diet. Voiding without difficulty. Breastfeeding with minimal assist for latch and postioning. Significant other at bedside. Continue cares.

## 2018-07-26 VITALS
RESPIRATION RATE: 16 BRPM | SYSTOLIC BLOOD PRESSURE: 114 MMHG | DIASTOLIC BLOOD PRESSURE: 68 MMHG | OXYGEN SATURATION: 97 % | HEART RATE: 106 BPM | BODY MASS INDEX: 25.11 KG/M2 | HEIGHT: 67 IN | WEIGHT: 160 LBS | TEMPERATURE: 98.7 F

## 2018-07-26 PROCEDURE — 25000132 ZZH RX MED GY IP 250 OP 250 PS 637: Performed by: STUDENT IN AN ORGANIZED HEALTH CARE EDUCATION/TRAINING PROGRAM

## 2018-07-26 RX ORDER — CALCIUM CARBONATE 500 MG/1
500 TABLET, CHEWABLE ORAL 3 TIMES DAILY PRN
Status: DISCONTINUED | OUTPATIENT
Start: 2018-07-26 | End: 2018-07-26 | Stop reason: HOSPADM

## 2018-07-26 RX ADMIN — OXYCODONE HYDROCHLORIDE AND ACETAMINOPHEN 2 TABLET: 5; 325 TABLET ORAL at 12:58

## 2018-07-26 RX ADMIN — SENNOSIDES AND DOCUSATE SODIUM 2 TABLET: 8.6; 5 TABLET ORAL at 09:03

## 2018-07-26 RX ADMIN — CALCIUM CARBONATE (ANTACID) CHEW TAB 500 MG 500 MG: 500 CHEW TAB at 09:03

## 2018-07-26 RX ADMIN — OXYCODONE HYDROCHLORIDE AND ACETAMINOPHEN 2 TABLET: 5; 325 TABLET ORAL at 07:43

## 2018-07-26 RX ADMIN — IBUPROFEN 600 MG: 600 TABLET ORAL at 02:02

## 2018-07-26 RX ADMIN — OXYCODONE HYDROCHLORIDE AND ACETAMINOPHEN 2 TABLET: 5; 325 TABLET ORAL at 03:13

## 2018-07-26 RX ADMIN — IBUPROFEN 600 MG: 600 TABLET ORAL at 10:30

## 2018-07-26 RX ADMIN — MENTHOL 1 PATCH: 205.5 PATCH TOPICAL at 09:19

## 2018-07-26 RX ADMIN — SILDENAFIL 20 MG: 20 TABLET, FILM COATED ORAL at 07:43

## 2018-07-26 NOTE — CONSULTS
[facility]  MATERNAL CHILD HEALTH   INITIAL NICU PSYCHOSOCIAL ASSESSMENT     DATA:     Reason for Social Work Consult: [place]  Depression Scale score of 10.    Presenting Information: Mom gave birth to a 38.0 week gestation female infant.  Mom is .    Living Situation: Mom and Dad live in a house in [place].    Family Constellation: Mom and Dad have one other child, a three year old girl. They both have extended family nearby.    Social Support: Mom and Dad report having a strong support network of family and friends.    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 During this hospitalization she reports having two months off to care for her infant. Dad operates a store in [place], to which he commutes daily, but will have two weeks off to care for the family.  They report no stressors related to their maternity/paternity plan.    Insurance: [employer]    Source of Financial Support: Parents' employment     Mental Health History: Mom has a history of anxiety and depression, for which she received medical management during her pregnancy.    History of Postpartum Mood Disorders: Mom reports that she had some anxiety a few months postpartum with her first child.    Chemical Health History: Not assessed    Current Coping: Mom appear to be coping well with the postpartum transition; SW has no concerns.    Community Resources//Baby Supplies: Family reports that they have all supplies needed to care for infant. They do express some concern regarding the parking expense of hospitalization.  SW provided parents with a daily parking pass.    INTERVENTION:       MARCELLE completed chart review and collaborated with the multidisciplinary team.     Psychosocial Assessment     Introduction to Maternal Child Health  role and scope of practice     Assessed Mental Health History and Current Symptoms     Identified stressors, barriers and family  concerns     Provided supportive counseling. Active empathetic listening and validation.     Provided psychoeducation on  mood and anxiety disorders, assessed for any current symptoms or history    Provided community resource postcard for [facility] [place] ([facility])     ASSESSMENT:     Coping:  adequate, functional    Affect: appropriate    Mood: calm    Motivation/Ability to Access Services: independent in accessing services    Assessment of Support System: adequate, appropriate    Level of engagement with SW: Engaged and appropriate. Able to seek out SW when needs arise.     Family and parent/infant interactions: Parents seem supportive of each other and are bonding with pt as they are able.     Assessment of parental risk for PMAD: Higher than average risk given history of anxiety and depression.    Strengths: caring family, strong support network    Vulnerabilities: none identified    Identified Barriers: none identified    PLAN:     SW will continue to follow throughout pt's Maternal-Child Health Journey as needs arise. SW will continue to collaborate with the multidisciplinary team.    SAMANTA Mckeon, LGSW    [facility]  Phone: [phone]  Pager: [phone]

## 2018-07-26 NOTE — PLAN OF CARE
Problem: Patient Care Overview  Goal: Plan of Care/Patient Progress Review  Outcome: Adequate for Discharge Date Met: 07/26/18  Data: Vital signs stable, assessments within normal limits.   Breastfeeding independently and well, latch checked.   No evidence of significant jaundice, patient instructed of signs/symptoms to look for and report per discharge instructions.   Discharge outcomes on care plan met.   No apparent pain.  Action: Review of care plan, teaching, and discharge instructions done with patient. Infant identification with ID bands done, patient verification with signature obtained.   Response: Patient states understanding and comfort with self cares and feeding. All questions addressed. Parents discharged with infant @ 1420. Awaiting transport.

## 2018-07-26 NOTE — PROVIDER NOTIFICATION
"   07/26/18 1151   Provider Notification   Provider Name/Title    Method of Notification Electronic Page   Request Evaluate-Remote   Notification Reason Medication Request   Paged provider about patient stated that she was told she would get to go home with a \"gel-form\" of the Icy-hot patch and that she wanted something similar to use at home. Did not see in the discharge medications.  "

## 2018-07-26 NOTE — PROGRESS NOTES
Post Partum Progress Note  PPD#3    Subjective:  She is resting comfortably in bed this morning. Pt with continued poor pain control yesterday afternoon, had improvement with addition of Ibuprofen. She is voiding spontaneously. She is tolerating PO intake. Lochia present and is similar to light menses. She is ambulating without dizziness or difficulty. Passing flatus, no BM yet. She denies headache, changes in vision, nausea/vomiting, chest pain, shortness of breath, RUQ pain, or worsening edema.  Plans to breastfeed.    Objective:  Vitals:    18 1937 18 2334 18 0803 18 1619   BP: 122/68 113/53 105/45 121/66   BP Location: Left arm  Left arm    Pulse: 110   106   Resp:    Temp: 99.3  F (37.4  C) 98.2  F (36.8  C) 98.7  F (37.1  C) 98.6  F (37  C)   TempSrc: Oral Oral Oral Oral   SpO2:   97%    Weight:       Height:           General: NAD, resting comfortably  CV: Regular rate, well perfused.   Pulm: Normal respiratory effort.  Abd: Soft, non-distended, appropriately tender  Incision: Steri-strips in place, c/d/i  Ext: No lower extremity edema bilaterally. No calf tenderness.    Assessment/Plan:  Anjelica Holloway is a 23 year old  female who is POD#3 s/p RLTCS.    - Encourage routine post-operative goals including ambulation and incentive spirometry  - PNC: Rh positive. Rubella immune. No intervention indicated.  - Pain: controlled on oral medications and Lidocaine patches. Ibuprofen added yesterday after discussion with patient. Remote h/o gastric ulcer after daily Ibuprofen use for month as a teen. Okay to use Ibuprofen over short term for  pain.   - Heme: Hgb 9.8>>9.3. Will discharge home with iron.  - GI: continue anti-emetics and stool softeners as needed.  - : s/p presley removal, voiding spontaneously  - Infant: Stable in room with mom  - Feeding: Plans on breastfeeding.  - BC: Plans on IUD at 6w PP visit  - Mood: Anxiety, weaned off celexa 2 weeks prior  to delivery. Plans to continue celexa with breastfeeding.     Discharge to home on POD#3 (today).    # Pain Assessment:  Current Pain Score 2018   Patient currently in pain? yes   Pain score (0-10) -   Pain location Incision   Pain descriptors Radiating;Sore   - Anjelica is experiencing pain due to  section. Pain management was discussed and the plan was created in a collaborative fashion.  Anjelica's response to the current recommendations: engaged  - Please see the plan for pain management as documented above    Etelvina Yoo MD  Ob/Gyn PGY-2  18 6:23 AM     Staff MD Note    I appreciate the note by Dr. Yoo.  Any necessary changes have been made by me.  I evaluated the patient with the resident and agree with the assessment and plan.    Thu Toledo MD

## 2018-07-26 NOTE — PLAN OF CARE
Problem: Patient Care Overview  Goal: Plan of Care/Patient Progress Review  Outcome: Improving  VSS. Postpartum checks WDL. Incision LYNNETTE with steri-strips- no s/s of infection. Up independently, voiding without difficulty. Pain better controlled with ibuprofen, percocet, and lidocaine patches. Breastfeeding well on demand. Finger-feeding formula per parent's request.  supportive at the bedside.

## 2018-07-27 ENCOUNTER — TELEPHONE (OUTPATIENT)
Dept: MATERNAL FETAL MEDICINE | Facility: CLINIC | Age: 23
End: 2018-07-27

## 2018-07-27 NOTE — TELEPHONE ENCOUNTER
"Patient's partner called this morning concerned patient would not have enough percocet with the Rx of 20 pills discharged with yesterday 18.  Writer asked to speak to patient.  Pt states she is taking 2 percocet q 4 hrs for incisional pain s/p  section on . Discharge instructions list ibuprofen and lidocaine gel as other pain relievers.  Anjelica states incision pain is a \"6-7\" before percocet and comes down to \"4\" after percocet.  Patient states she has not been using ibuprofen except for in hospital and insurance didn't cover lidocaine gel (also available OTC). Anjelica denies increased vaginal bleeding, cramping, redness, swelling, or drainage at incision site.  Writer spoke with [facility] provider regarding patient's concerns.  Encouraged patient to try ibuprofen while decreasing/weaning off percocet.  If pain is not improving patient should call clinic to make an appointment to be seen by a provider for evaluation.  Patient states she will see how the weekend goes and call Monday if pain is not improving.      Thu Rdz RN  "

## 2018-12-19 ENCOUNTER — TELEPHONE (OUTPATIENT)
Dept: MATERNAL FETAL MEDICINE | Facility: CLINIC | Age: 23
End: 2018-12-19

## 2018-12-19 NOTE — TELEPHONE ENCOUNTER
Patient requesting wanting to come back to [facility] for future appointments. She said she has contacted her clinic a while ago and they were supposed to send one already. Writer called provider and left them a msg to send referral along with records.       [place]- [place]  [phone]    Referring clinic notified    Domenico Sanchez,    , [facility]

## 2018-12-21 ENCOUNTER — TELEPHONE (OUTPATIENT)
Dept: MATERNAL FETAL MEDICINE | Facility: CLINIC | Age: 23
End: 2018-12-21

## 2018-12-21 NOTE — TELEPHONE ENCOUNTER
Patient called again wanting to know if we received anything from her provider yet. Writer told her that [facility] has not seen a referral yet. Patient said she will try to contact provider again.      Domenico Sanchez,    , [facility]

## 2018-12-28 ENCOUNTER — TRANSCRIBE ORDERS (OUTPATIENT)
Dept: MATERNAL FETAL MEDICINE | Facility: CLINIC | Age: 23
End: 2018-12-28

## 2018-12-28 DIAGNOSIS — O26.90 PREGNANCY RELATED CONDITION, ANTEPARTUM: Primary | ICD-10-CM

## 2018-12-31 DIAGNOSIS — Z33.1 PREGNANCY, INCIDENTAL: ICD-10-CM

## 2018-12-31 DIAGNOSIS — Q79.60 EHLERS-DANLOS SYNDROME: Primary | ICD-10-CM

## 2019-01-14 NOTE — PROGRESS NOTES
Maternal-Fetal Medicine Transfer of Care/New Obstetric visit    Anjelica Holloway  : 1995  MRN: [id number]    REFERRAL:  Anjelica Holloway is a 23 year old   here for initiation of prenatal care due to high risk pregnancy. She has previously been followed in our MFM clinic for her last pregnancy less than 6 months ago.     HPI:  Anjelica Holloway is a 23 year old  at 14w1d by 14w1d US (today, uncertain LMP previously)  presenting to establish care for high risk pregnancy. This is short interval pregnancy, delivered 18. She has no complaints today. Denies any issues today.     Of note, from a chronic pain standpoint, when initially asked patient denied taking any narcotics since her  section. Though query of [place]  shows hydrocodone prescription written for 2018 with 90 tabs, as well as monthly prescriptions for 90 tabs each also for about the last 4 months, from provider in [place].  When patient asked about this she initially denied use. Then said that she remembered filling this prescription, though says she has not been taking it. In review of chart, patient noted to have contacted provider for having lost prescriptions for Dec and  (not specifically inquired to patient as she appeared upset from initial discussion).  Patient's  became very defensive. Patient was also taking sildenafil for muscle spasms from peripheral vascular disease, she only rarely takes this. Asked patient also about anxiety, though when subject brought up her partner became visibly distressed and discussion was postponed. Per RN patient reported not taking any medications.     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 Last Rx was for 90 tabs in 2018.   - H/o  section x2  - Anxiety- no meds currently  - FLNA gene mutation:  genetic evaluation of baby for possible otopalatodigital spectrum disorders after birth (per prior pregnancy with MFM)  - Tobacco abuse: 2 cig/day (previously 1/2 ppd)    Prenatal Care:  Primary OB care this pregnancy has been - no care prior to now. Went to urgent care for UTI symptoms and had +UPT  Dating:    Unsure LMP, dating by US today.     Obstetrics History:  Obstetric History       T2      L2     SAB0   TAB0   Ectopic0   Multiple0   Live Births2       # Outcome Date GA Lbr Lewis/2nd Weight Sex Delivery Anes PTL Lv   2 Term 18 38w0d  3.49 kg (7 lb 11.1 oz) F  Spinal N KEYON      Name: WILIAN SMITH SIDNEY      Apgar1:  9                Apgar5: 9   1 Term 05/20/15 38w0d  3.204 kg (7 lb 1 oz) F CS-LTranv  N KEYON      Apgar1:  9                Apgar5: 9        Gynecologic History:  - Last Pap: 18  - Denies any history of abnormal pap smears  - Denies prior cervical surgery or procedures  - Denies any history of frequent UTIs, vaginal infections, or STIs    Past Medical History:  Past Medical History:   Diagnosis Date     ADHD (attention deficit hyperactivity disorder)      Ehler's-Danlos syndrome 2013     Problem list name updated by automated process. Provider to review and confirm     Headaches      Joint hyperextensibility of multiple sites     suspect Ehler-Danlos but genetics testing not conclusive      Meckel's diverticulum     rupture of Meckel's diverticulum s/p bowel resection, approx 4 inches removed     PAD (peripheral artery disease) (H) 2012    Non-atherosclerotic PAD due to unknown arterial occlusive disease, presumed Bill-Danlos syndrome, with superimposed vasospasm      Peptic ulcer disease     secondary to prolonged NSAIDs use for headache     POTS  (postural orthostatic tachycardia syndrome)      Vascular abnormality 2013     Vascular anomalies, 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, and hypoplastic right posterior tibular arteries     Past Surgical History:    Past Surgical History:   Procedure Laterality Date     ------------OTHER-------------  rupture Meckel diverticulum with removal of necroic bowel    Removal of      APPENDECTOMY        SECTION N/A 2015    Procedure:  SECTION;  Surgeon: Zulma Chavez MD;  Location: [facility]      SECTION N/A 2018    Procedure:  SECTION;  Repeat  Section ;  Surgeon: Jayne Smith MD;  Location: [facility]     CHOLECYSTECTOMY         Current Medications:    Prior to Admission medications    Medication Sig Last Dose Taking? Auth Provider   calcium-vitamin D-vitamin K (VIACTIV) 500-500-40 MG-UNT-MCG CHEW Take 1 tablet by mouth daily. Unknown at Unknown time  Unknown, Entered By History   Capsaicin-Menthol 0.025-5 % PTCH Externally apply 1 patch topically daily as needed   Thu Toledo MD   Cholecalciferol (VITAMIN D3 PO) Take 1,000 Units by mouth daily. Past Week at Unknown time  Unknown, Entered By History   cyclobenzaprine (FLEXERIL) 10 MG tablet Take 1 tablet (10 mg) by mouth 3 times daily as needed for muscle spasms Past Month at Unknown time  Lizz Gutierrez MD   cyclobenzaprine (FLEXERIL) 5 MG tablet Take 1 tablet (5 mg) by mouth nightly as needed for muscle spasms Past Month at Unknown time  Peggy Ramachandran MD   ferrous sulfate (IRON) 325 (65 Fe) MG tablet Take 1 tablet (325 mg) by mouth daily (with breakfast)   Rita Storm MD   ibuprofen (ADVIL/MOTRIN) 600 MG tablet Take 1 tablet (600 mg) by mouth every 6 hours as needed for moderate pain   Lisha Corcoran MD   Lidocaine HCl 3 % GEL Externally apply topically daily as needed   Lisha Corcoran  MD [facility]   methylphenidate (RITALIN LA) 10 MG CP24 Take 30 mg by mouth 2 tabs of 10mg in A.M and 1  10mg in P.M Unknown at Unknown time  Reported, Patient   oxyCODONE-acetaminophen (PERCOCET) 5-325 MG per tablet Take 1-2 tablets by mouth every 4 hours as needed for moderate to severe pain   Etelvina Yoo MD   PARoxetine (PAXIL) 10 MG tablet Take 1 tablet (10 mg) by mouth every morning   Rita Storm MD   Prenatal Vit-Fe Fumarate-FA (PRENATAL MULTIVITAMIN PLUS IRON) 27-0.8 MG TABS per tablet Take 1 tablet by mouth daily Past Week at Unknown time  Reported, Patient   senna-docusate (SENOKOT-S;PERICOLACE) 8.6-50 MG per tablet Take 2 tablets by mouth 2 times daily as needed for constipation   Rita Storm MD   sildenafil (REVATIO/VIAGRA) 20 MG tablet Take 1 tablet by mouth 3 times daily. 2018 at Unknown time  Erich Billingsley MD     Medications Prior to Pregnancy:  Reports that she is currently only taking vitamins. Denies current use of paxil, hydrocodone, percocet, sildenafil, senna, iron, flexeril    Allergies:  Nsaids - had ulcer due to overuse, not true allergy    Social History:   - Tobacco use. Denies alcohol or illicit drug use    Family History:  Denies bleeding or clotting disorder, no anesthesia problems     ROS:  10-point ROS negative except as in HPI     PHYSICAL EXAM:  /49   LMP 10/15/2018   Gen: NAD, well appearing  Chest: Non-labored breathing    Ultrasounds:   Please see full imaging report from [facility] program under imaging tab for due date determination.       DATE  GA  ASSESSMENT  1/15/18  14w1d  Dating    ASSESSMENT:  Anjelica Holloway is a 23 year old  at 14w1d by 14w1d US (today, uncertain LMP previously)  presenting to establish care for high risk pregnancy. This is short interval pregnancy, delivered 18.    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   - Congenital vascular anomalies of lower extremities with peripheral vascular disease - history of chronic opioid prescriptions ([place]  today by ALEJANDRA Arias with evidence of 90 tabs of hydrocodone/acetaminophen tabs every month by provider in [place]. Last Rx was for 90 tabs in 2018) Denies now. Will readdress if needed later in pregnancy. Given two months since last Rx and denies ongoing use, no urine drug screen performed today.   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   - Follows with Dr. Gutierrez.   - Plan for patient to see genetic counseling at next visit       #Vascular spasms/peripheral vascular disease  - Followed by cardiology (Dr. Gutierrez)  - Was on Sildenafil BID for symptomatic relief, due to no symptoms patient no longer on this medication   - Increased blood flow during pregnancy may actually improve her symptoms and small vessel disease.  - Aggressive preventive measures will continue, such as warm socks at all times and avoiding prolonged cold exposures.   - Chronic pain, was on hydrocodone. Concern for diversion given that patient initially denied use, though prescribed 2018    #Anxiety  - No medications currently  - Readdress at next visit, preferably when patient alone    #Tobacco abuse  - currently 2 cigs/day  - Counseled patient on importance of smoking cessation     #Routine PNC  - NOB Labs ordered today. Will obtain Gc/Ch at next visit.   - Weight gain: discussed recommended weight gain with patient she currently has normal BMI  - NOHEMY  gene mutation:  genetic evaluation of baby for possible otopalatodigital spectrum disorders after birth  - Genetic screening: plan to meet with genetic counselor at next visit - tried to leave message with patient about this visit, mailbox on mobile phone was full.   -  testing: plan for Q4 week growths starting after next visit  - Patient will need anesthesia consult   - Concern at visit today for increased risk of domestic abuse, unable to screen today: partner very controlling during visit, became upset when talking about patient's narcotic prescription and when talking about her anxiety. When patient is alone will need to screen for abuse     #Short interval pregnancy   - Discussed increased complications associated with short interval spacing  - With 2 prior  sections, unclear how well scar will have healed, given this will plan to assess lower uterine segment during growth ultrasounds.       #Delivery planning:  - Plan for repeat  section at 38 weeks   - Return precautions reviewed      I acted as a scribe for Dr. Hugo Parsons MD  Obstetrics and Gyncology, PGY-2  January 15, 2019 , 9:40 AM      This note, as scribed, accurately reflects the examination, my impressions, and the plan as I discussed it with the patient and her partner.     Melanie Arias MD  Maternal Fetal Medicine

## 2019-01-15 ENCOUNTER — OFFICE VISIT (OUTPATIENT)
Dept: MATERNAL FETAL MEDICINE | Facility: CLINIC | Age: 24
End: 2019-01-15
Attending: OBSTETRICS & GYNECOLOGY
Payer: MEDICAID

## 2019-01-15 ENCOUNTER — HOSPITAL ENCOUNTER (OUTPATIENT)
Dept: ULTRASOUND IMAGING | Facility: CLINIC | Age: 24
Discharge: HOME | End: 2019-01-15
Attending: OBSTETRICS & GYNECOLOGY | Admitting: OBSTETRICS & GYNECOLOGY
Payer: MEDICAID

## 2019-01-15 VITALS — DIASTOLIC BLOOD PRESSURE: 49 MMHG | SYSTOLIC BLOOD PRESSURE: 104 MMHG

## 2019-01-15 DIAGNOSIS — Q79.60 EHLERS-DANLOS SYNDROME: ICD-10-CM

## 2019-01-15 DIAGNOSIS — Z33.1 PREGNANCY, INCIDENTAL: ICD-10-CM

## 2019-01-15 LAB
ABO + RH BLD: NORMAL
ABO + RH BLD: NORMAL
BASOPHILS # BLD AUTO: 0 10E9/L (ref 0–0.2)
BASOPHILS NFR BLD AUTO: 0.1 %
BLD GP AB SCN SERPL QL: NORMAL
BLOOD BANK CMNT PATIENT-IMP: NORMAL
DIFFERENTIAL METHOD BLD: NORMAL
EOSINOPHIL # BLD AUTO: 0.1 10E9/L (ref 0–0.7)
EOSINOPHIL NFR BLD AUTO: 1.1 %
ERYTHROCYTE [DISTWIDTH] IN BLOOD BY AUTOMATED COUNT: 13.7 % (ref 10–15)
HBV SURFACE AG SERPL QL IA: NONREACTIVE
HCT VFR BLD AUTO: 37.2 % (ref 35–47)
HGB BLD-MCNC: 12.2 G/DL (ref 11.7–15.7)
HIV 1+2 AB+HIV1 P24 AG SERPL QL IA: NONREACTIVE
IMM GRANULOCYTES # BLD: 0 10E9/L (ref 0–0.4)
IMM GRANULOCYTES NFR BLD: 0.4 %
LYMPHOCYTES # BLD AUTO: 2 10E9/L (ref 0.8–5.3)
LYMPHOCYTES NFR BLD AUTO: 19.4 %
MCH RBC QN AUTO: 29.5 PG (ref 26.5–33)
MCHC RBC AUTO-ENTMCNC: 32.8 G/DL (ref 31.5–36.5)
MCV RBC AUTO: 90 FL (ref 78–100)
MONOCYTES # BLD AUTO: 0.4 10E9/L (ref 0–1.3)
MONOCYTES NFR BLD AUTO: 4.1 %
NEUTROPHILS # BLD AUTO: 7.7 10E9/L (ref 1.6–8.3)
NEUTROPHILS NFR BLD AUTO: 74.9 %
NRBC # BLD AUTO: 0 10*3/UL
NRBC BLD AUTO-RTO: 0 /100
PLATELET # BLD AUTO: 316 10E9/L (ref 150–450)
RBC # BLD AUTO: 4.13 10E12/L (ref 3.8–5.2)
SPECIMEN EXP DATE BLD: NORMAL
TSH SERPL DL<=0.005 MIU/L-ACNC: 1.52 MU/L (ref 0.4–4)
WBC # BLD AUTO: 10.3 10E9/L (ref 4–11)

## 2019-01-15 PROCEDURE — 86900 BLOOD TYPING SEROLOGIC ABO: CPT | Performed by: OBSTETRICS & GYNECOLOGY

## 2019-01-15 PROCEDURE — 86762 RUBELLA ANTIBODY: CPT | Performed by: OBSTETRICS & GYNECOLOGY

## 2019-01-15 PROCEDURE — 85025 COMPLETE CBC W/AUTO DIFF WBC: CPT | Performed by: OBSTETRICS & GYNECOLOGY

## 2019-01-15 PROCEDURE — 76805 OB US >/= 14 WKS SNGL FETUS: CPT

## 2019-01-15 PROCEDURE — 86901 BLOOD TYPING SEROLOGIC RH(D): CPT | Performed by: OBSTETRICS & GYNECOLOGY

## 2019-01-15 PROCEDURE — 84443 ASSAY THYROID STIM HORMONE: CPT | Performed by: OBSTETRICS & GYNECOLOGY

## 2019-01-15 PROCEDURE — G0499 HEPB SCREEN HIGH RISK INDIV: HCPCS | Performed by: OBSTETRICS & GYNECOLOGY

## 2019-01-15 PROCEDURE — 87389 HIV-1 AG W/HIV-1&-2 AB AG IA: CPT | Performed by: OBSTETRICS & GYNECOLOGY

## 2019-01-15 PROCEDURE — 87086 URINE CULTURE/COLONY COUNT: CPT | Performed by: OBSTETRICS & GYNECOLOGY

## 2019-01-15 PROCEDURE — 86850 RBC ANTIBODY SCREEN: CPT | Performed by: OBSTETRICS & GYNECOLOGY

## 2019-01-15 PROCEDURE — G0463 HOSPITAL OUTPT CLINIC VISIT: HCPCS | Mod: 25,ZF

## 2019-01-15 PROCEDURE — 86780 TREPONEMA PALLIDUM: CPT | Performed by: OBSTETRICS & GYNECOLOGY

## 2019-01-15 PROCEDURE — 36415 COLL VENOUS BLD VENIPUNCTURE: CPT | Performed by: OBSTETRICS & GYNECOLOGY

## 2019-01-15 NOTE — NURSING NOTE
Anjelica seen in clinic today for primary OBV due to pregnancy c/b Ehler's Danlos Syndrome at 14.1 weeks gestation (see report/notes). Patient accompanied by FOB.  VSS. Pt denies bldg/lof/change in discharge/contractions/headache/vision changes/chest pain/SOB/edema. Dr. Parsons and Dr. Arias met with pt and discussed POC. Plan to f/u with OBV, repeat ultrasound and GC consult in 4 weeks.  Patient to go down to outpatient lab for new OB labs. Pt discharged stable and ambulatory to lab after scheduling future appointments.

## 2019-01-16 LAB
RUBV IGG SERPL IA-ACNC: 8 IU/ML
T PALLIDUM AB SER QL: NONREACTIVE

## 2019-01-17 ENCOUNTER — TELEPHONE (OUTPATIENT)
Dept: MATERNAL FETAL MEDICINE | Facility: CLINIC | Age: 24
End: 2019-01-17

## 2019-01-17 NOTE — TELEPHONE ENCOUNTER
Writer attempted to call Anjelica to see if she has had rubella vaccination after her last delivery as her rubella came back equivocal.  Anjelica's telephone mailbox was full so will writer unable to leave a message. Thu Henriquez RN

## 2019-02-19 ENCOUNTER — HOSPITAL ENCOUNTER (OUTPATIENT)
Dept: ULTRASOUND IMAGING | Facility: CLINIC | Age: 24
Discharge: HOME | End: 2019-02-19
Attending: OBSTETRICS & GYNECOLOGY | Admitting: OBSTETRICS & GYNECOLOGY
Payer: MEDICAID

## 2019-02-19 ENCOUNTER — OFFICE VISIT (OUTPATIENT)
Dept: MATERNAL FETAL MEDICINE | Facility: CLINIC | Age: 24
End: 2019-02-19
Attending: OBSTETRICS & GYNECOLOGY
Payer: MEDICAID

## 2019-02-19 VITALS
BODY MASS INDEX: 23.57 KG/M2 | HEART RATE: 88 BPM | SYSTOLIC BLOOD PRESSURE: 108 MMHG | DIASTOLIC BLOOD PRESSURE: 55 MMHG | OXYGEN SATURATION: 99 % | WEIGHT: 150.5 LBS

## 2019-02-19 DIAGNOSIS — Z33.1 PREGNANCY, INCIDENTAL: ICD-10-CM

## 2019-02-19 DIAGNOSIS — Q79.60 EHLERS-DANLOS SYNDROME: ICD-10-CM

## 2019-02-19 PROCEDURE — 81001 URINALYSIS AUTO W/SCOPE: CPT | Performed by: OBSTETRICS & GYNECOLOGY

## 2019-02-19 PROCEDURE — 76816 OB US FOLLOW-UP PER FETUS: CPT

## 2019-02-19 PROCEDURE — 76811 OB US DETAILED SNGL FETUS: CPT

## 2019-02-19 PROCEDURE — G0463 HOSPITAL OUTPT CLINIC VISIT: HCPCS | Mod: 25,ZF

## 2019-02-19 ASSESSMENT — PAIN SCALES - GENERAL: PAINLEVEL: NO PAIN (0)

## 2019-02-19 NOTE — PROGRESS NOTES
Maternal-Fetal Medicine Return Visit    Anjelica Holloway  : 1995  MRN: [id number]    S:   Anjelica Holloway is a 23 year old  at 19w0d who presents for prenatal visit. She states she is feeling well today. She does report that she has had several yeast infections during the current pregnancy, and that she experienced recurrent yeast infections in her previous pregnancy. She has tried one day monistat treatment and does get resolution of symptoms. Anjelica is asymptomatic today. She denies any other concerns today.     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   - Congenital vascular anomalies of lower extremities with peripheral vascular disease - history of chronic opioid prescriptions (review [place]  on 1/15 by ALEJANDRA Arias with evidence of 90 tabs of hydrocodone/acetaminophen tabs every month by provider in [place]. Last Rx was for 90 tabs in 2018.   - H/o  section x2  - Anxiety- no meds currently  - FLNA gene mutation:  genetic evaluation of baby for possible otopalatodigital spectrum disorders after birth (per prior pregnancy with MFM)  - Tobacco abuse: 2 cig/day (previously 1/2 ppd)    O:  LMP 10/15/2018   Gen: NAD, well appearing  Chest: Non-labored breathing    Ultrasounds:   [facility] Comprehensive US performed today, see imaging tab.     ASSESSMENT:  Anjelica Holloway is a 23 year old  at 19w0d by 14w1d US (today, uncertain LMP previously) here for return visit. Short interval pregnancy, delivered 18.    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   - Follows with Dr. Gutierrez.   - Genetic counseling rescheduled for next visit      #Vascular spasms/peripheral vascular disease  - Followed by cardiology (Dr. Gutierrez)  - Was on Sildenafil BID for symptomatic relief, due to no symptoms patient no longer on this medication   - Increased blood flow during pregnancy may actually improve her symptoms and small vessel disease.  - Aggressive preventive measures will continue, such as warm socks at all times and avoiding prolonged cold exposures.   - Chronic pain, was on hydrocodone. Concern for diversion given that patient initially denied use, though prescribed 2018.    #Anxiety  - No medications currently  - Pt reports symptoms current stable    #Tobacco abuse  - currently 2 cigs/day  - Counseled patient on importance of smoking cessation     #Routine PNC  - Rh pos, rubella immune, infectious labs neg. Need to collect UCx today  - FLNA gene mutation:  genetic evaluation of baby for possible otopalatodigital spectrum disorders after birth  - Genetic screening: genetic counselor this visit  -  testing: Q4 week growth  - Patient will need anesthesia consult   - Concern at prior visit for increased risk of domestic abuse. Will monitor/screen for abuse.    #Short interval pregnancy   - Discussed increased complications associated with short interval spacing  - With 2 prior  sections, unclear how well scar will have healed, given this will plan to assess lower uterine segment during growth ultrasounds.       #Delivery planning:  - Plan for repeat   section at 38 weeks   - Return precautions reviewed    This note, as scribed, accurately reflects the examination, my impressions and plan as discussed with the patient.    Peggy Ramachandran MD  Specialist in Maternal-Fetal Medicine

## 2019-02-19 NOTE — NURSING NOTE
Anjelica is here today for f/u obv due to preg c/b EDS, Benny Vascular anomalies of LE.  Patient reports +FM, denies ctx, denies sROM, and denies vag bleeding.  Patient has reports of frequent yeast infections.  Dr. Ramachandran in to see patient. Patient to come back in 4 weeks for f/u obv and f/u comp U/S. On U/S, suboptimal spine was seen and lower legs not visualized well as baby was breech. Patient to lab for UA/UC and left amb and stable. Patient will try the Monistat for 5 days next time. Thu Henriquez RN

## 2019-02-20 LAB
BACTERIA SPEC CULT: NORMAL
BACTERIA SPEC CULT: NORMAL
SPECIMEN SOURCE: NORMAL

## 2019-03-21 ENCOUNTER — OFFICE VISIT (OUTPATIENT)
Dept: MATERNAL FETAL MEDICINE | Facility: CLINIC | Age: 24
End: 2019-03-21
Attending: OBSTETRICS & GYNECOLOGY
Payer: COMMERCIAL

## 2019-03-21 ENCOUNTER — HOSPITAL ENCOUNTER (OUTPATIENT)
Dept: ULTRASOUND IMAGING | Facility: CLINIC | Age: 24
Discharge: HOME | End: 2019-03-21
Attending: OBSTETRICS & GYNECOLOGY | Admitting: OBSTETRICS & GYNECOLOGY
Payer: COMMERCIAL

## 2019-03-21 VITALS
HEART RATE: 84 BPM | DIASTOLIC BLOOD PRESSURE: 61 MMHG | OXYGEN SATURATION: 98 % | BODY MASS INDEX: 24.59 KG/M2 | SYSTOLIC BLOOD PRESSURE: 112 MMHG | WEIGHT: 157 LBS | RESPIRATION RATE: 20 BRPM

## 2019-03-21 DIAGNOSIS — Q79.60 EHLERS-DANLOS SYNDROME: ICD-10-CM

## 2019-03-21 DIAGNOSIS — Z33.1 PREGNANCY, INCIDENTAL: ICD-10-CM

## 2019-03-21 DIAGNOSIS — Z34.82 ENCOUNTER FOR SUPERVISION OF NORMAL PREGNANCY IN MULTIGRAVIDA IN SECOND TRIMESTER: ICD-10-CM

## 2019-03-21 DIAGNOSIS — Z33.1 PREGNANCY, INCIDENTAL: Primary | ICD-10-CM

## 2019-03-21 DIAGNOSIS — Z36.9 ANTENATAL SCREENING ENCOUNTER: Primary | ICD-10-CM

## 2019-03-21 DIAGNOSIS — Z36.9 ANTENATAL SCREENING ENCOUNTER: ICD-10-CM

## 2019-03-21 PROCEDURE — 76816 OB US FOLLOW-UP PER FETUS: CPT

## 2019-03-21 PROCEDURE — 87491 CHLMYD TRACH DNA AMP PROBE: CPT | Performed by: OBSTETRICS & GYNECOLOGY

## 2019-03-21 PROCEDURE — 96040 ZZH GENETIC COUNSELING, EACH 30 MINUTES: CPT | Mod: ZF | Performed by: GENETIC COUNSELOR, MS

## 2019-03-21 PROCEDURE — 87086 URINE CULTURE/COLONY COUNT: CPT | Performed by: OBSTETRICS & GYNECOLOGY

## 2019-03-21 PROCEDURE — 87591 N.GONORRHOEAE DNA AMP PROB: CPT | Performed by: OBSTETRICS & GYNECOLOGY

## 2019-03-21 PROCEDURE — G0463 HOSPITAL OUTPT CLINIC VISIT: HCPCS | Mod: 25,ZF

## 2019-03-21 PROCEDURE — 81511 FTL CGEN ABNOR FOUR ANAL: CPT | Performed by: OBSTETRICS & GYNECOLOGY

## 2019-03-21 PROCEDURE — 36415 COLL VENOUS BLD VENIPUNCTURE: CPT | Performed by: OBSTETRICS & GYNECOLOGY

## 2019-03-21 ASSESSMENT — PAIN SCALES - GENERAL: PAINLEVEL: NO PAIN (0)

## 2019-03-21 NOTE — NURSING NOTE
Anjelica here today for f/u comp and  F/u OBV due to preg c/b EDS. Patient reports +FM, denies ctx, denies SRoM and denies vag bleeding.  Dr. Phillips and Dr. Dowling in to talk with patient.Anjelica is complaining of leg cramps today. Patient will try to eat more bananas. Patient has appointment scheduled for 4 weeks for U/S and OBV. Patient met with GONZÁLEZ Wright today and wants Quad Screen drawn. Patient to lab to finish those  That have been ordered. Patient made appointments and left amb and stable. Thu Henriquez RN

## 2019-03-21 NOTE — PROGRESS NOTES
[facility]  Genetic Counseling Consult    Patient:  Anjelica Holloway YOB: 1995   Date of Service:  3/21/19      Anjelica Holloway was seen at the [facility] for genetic consultation as part of her appointment for comprehensive ultrasound.  The indication for genetic counseling is discussion of genetic screening options and review of previous genetic testing       Impression/Plan:   1. Anjelica has declined serum screening in this pregnancy.    2. Anjelica had a comprehensive (level II) ultrasound today.  Please see the ultrasound report for further details.    3. The patient had quad screen.  Results are expected within 1-4 days, and will be available in [employer].  We will contact her to discuss the results, and a copy will be forwarded to the office of the referring OB provider. Anjelica Holloway provided verbal permission for results to be left on her voicemail at [phone].     Pregnancy History:   /Parity:    Age at Delivery: 24 year old  MADELINE: 7/15/2019, by Ultrasound   Gestational Age: 23w3d    No significant complications or exposures were reported in the current pregnancy.    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 She also has vascular anomalies such as missing leg arteries and history of an intestinal rupture. For a detailed review of Anjelica's medical history please see previous and recent maternal-fetal medicine consultations or genetic evaluations documented by Karishma Shoemaker.     Bill-Danlos syndrome (EDS) is a group of connective tissue disorders that affect the skin, bones, and blood vessels of many organs and tissues. There are 13 different types of EDS with six of the types being more common and the remaining increasingly rare. A few symptoms are common among most  "types of EDS including hypermobility and elastic and fragile skin. Further symptoms are unique or more common in some types. In the arthrochalasia type, for example, infants have hypermobility, hypotonia, and dislocations of hips at birth. Individuals with the classic type often have poor wound healing and have characteristic \"cigarette paper\" scars. Lastly, in the vascular type there is an increased risk for blood vessel rupture leading to life-threatening complications, especially during pregnancy when organ rupture is possible. Individuals with EDS often report chronic pain and systemic health problems like gastrointestinal issues and joint pain.     Mutations in 19 genes have been identified to cause the various Bill-Danlos syndromes. Most cases of the vascular type result from mutations in the COL3A1 gene, and rarely the COL1A1 gene. Genetic testing for the hypermobile type typically has low yield. Therefore, it is common for the hypermobile type to be clinically diagnosed. Treatment and management of the condition largely depends on the type and complications. Some individuals may need comprehensive care over multiple specialities.     The inheritance of EDS depends on the type. The classical, vascular, arthrochalasia, periodontal, and likely the hypermobile type typically have an autosomal dominant pattern of inheritance. This means an affected individual has one copy of the causative gene with a mutation. An affected parent of an autosomal dominant condition has a 50% chance of passing on the gene to their child. Other types like the classic-like, cardiac-valvular, dermatosparaxis, kyphoscoliotic, spondylodysplastic, and musculocontractural types have an autosomal recessive pattern of inheritance.       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 Anjelica had a normal MRI after this finding.     The FLNA gene is X-linked which means males (sex chromosomes X and Y) would be more severely affected if they inherit a FLNA pathogenic variant. In fact, this condition in males may be lethal. Those males that survive may have a condition called otopalatodigital spectrum disorders which includes a group of conditions with various features such as hearing loss, developmental delay, limb shortening, proximally placed thumbs, hypoplastic digits, skeletal dysplasia and possible birth defects (omphalocele, heart defects, cleft palate etc)    It is unknown if Anjelica's sons would be at risk for this spectrum of conditions since her variant is of unknown significance.     Per a genetics evaluation note 04/14/2015 it has been recommended (by Dr. Cleveland) that a child of Karely, especially a son, have a genetic evaluation by a physician after he/she is born. This was reviewed with Anjelica today.         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 Mutations/disruptions in the CNTNAP2 gene have been associated with\" neurobehavioral components including corticol dysplasia focal epilepsy syndrome, autism, and Jefferson-Ray syndrome.       Anjelica's father, Terrence, has not had testing to reveal if he has the same chromosome 7 gain or the FLNA variant. We discussed this may be helpful information for Anjelica. If her father had the FLNA variant we would not be as worried about the X-linked male lethal possible nature of the variant. Anjelica explained that he does not have insurance and likely would not want to do the testing.       We discussed it may be helpful for Anjelica to follow up with general genetics at some point to review the testing or talk about other options such as whole exome sequencing. This could also be discussed in the context of her children and especially if they show any symptoms or medical problems they may want to be tested for Anjelica's chromosome 7 gain or FLNA variant. These topics could not be throughlly reviewed today due to time constraints and focus on screening in the pregnancy. If Anjelica wishes, a genetic follow up may be warranted.          Family History:   A three-generation pedigree was obtained during previous visits in , and is scanned under the  Media  tab. This family history was reviewed today and it appears to have significant errors per patient. The original pedigree depicts Anjelica's mother, Ninfa is adopted, which Anjelica confirms. However, there is a note explaining, \"Ninfa's adoptive mother was also her half sister\". Anjelica said this is not true and her mother, Ninfa, does not know any of her biological family. The original pedigree depicts parents of Ninfa (mother that  at 73 from a brain aneurysm) as well as a full sibling and half-siblings as well as details about their children. Anjelica is not familiar with any of this family history and tells me her mother does not know her biological family. " "This family history was originally collected when Anjelica was 16 so her mother was likely involved at the time. I apologized for the errors and the pedigree was redrawn with what Anjelica reported today. That was scanned under the \"Media\" tab.     The following significant findings were reported by Anjelica:    The father of the pregnancy, Yeyo, is healthy    Yeyo and Anjelica have two daughters together, ages 3 and 7.       Anjelica's mother, Ninfa, has a clinical diagnosis of EDS and has increasing chronic pain issues. She also has a history of migraines. Ninfa was adopted and does not know her biological family    Anjelica's father, Terrence, is healthy. He has a sister that had her thyroid removed and a healthy sister that has three sons, one of which has parathyroid problems and intellectual disability       Anjelica's paternal grandmother is healthy. However, she has three brother all the  in their 30-50s from heart failure and some of their children may have similar health problems. Due to the extent of topics covered today this was not throughlly discussed     Anjelica's paternal grandfather has dementia. He has a sister that had breast cancer in her 50s and a brother that had stomach and prostate cancer in his 50-60s(?)        Yeyo has a sister with intellectual disability, thought to be due to \"shaken baby syndrome\". His sister \"lives in a group home and was molested\". She became pregnant with a son, he is healthy but may have intellectual disability. He was adopted by Yeyo's mother    Yeyo has another sister who is healthy with a healthy son and expecting a child     Yeyo's mother had breast cancer in her 40s.   We discussed the family history of breast cancer briefly. Cancer most often occurs by chance, however some families seem to develop cancer more frequently than expected. Everyone has a risk to develop cancer, but individuals may be at an increased risk to develop cancer based on their family history.. Genetic counseling is " "available for cancer syndromes. Cancer family history, even without genetic testing, can change cancer screening recommendations for family members and aid in insurance coverage for access to them as well. The most informative individuals to complete cancer genetic counseling and genetic testing are those with a personal history of cancer or those closely related to the affected individuals. This may be Yeyo's mother.    If the family wants more information they can contact the [facility] Cancer Risk Management Program ([phone]). Physicians can also make referrals at [email or web address] or, if within the [place] system, through [facility] referral for \"Cancer Risk Mgmt/Cancer Genetic Counseling\".     Indications to consider the program include a personal or family history of: Breast cancer before age 50        In both Anjelica and Yeyo's family there were multiple individuals reported to have intellectual disability. The cause is unknown for most of these relatives. It is difficult to understand how this family history impacts risks for intellectual disability in Anjelica and Yeyo's children.      Otherwise, the reported family history is negative for multiple miscarriages, stillbirths, birth defects, mental retardation, known genetic conditions, and consanguinity.       Carrier Screening:   The patient reports that she and the father of the pregnancy have  ancestry:      Cystic fibrosis is an autosomal recessive genetic condition that occurs with increased frequency in individuals of  ancestry and carrier screening for this condition is available.  In addition,  screening in the [facility] includes cystic fibrosis.      Expanded carrier screening for mutations in a large panel of genes associated with autosomal recessive conditions including cystic fibrosis, spinal muscular atrophy, and others, is now available.      Carrier screening " was not discussed today.       Risk Assessment for Chromosome Conditions:   We explained that the risk for fetal chromosome abnormalities increases with maternal age. We discussed specific features of common chromosome abnormalities, including Down syndrome, trisomy 13, trisomy 18, and sex chromosome trisomies.      At age 24 at midtrimester, the risk to have a baby with Down syndrome is 1 in 1040.    At age 24 at midtrimester, the risk to have a baby with any chromosome abnormality is 1 in 520.       Anjelica did not have maternal serum screening earlier in pregnancy.       Testing Options:   We discussed the following options:   Quad screen:     Maternal plasma AFP, hCG, estriol, and inhibin measurement between 15-24 weeks gestation    Provides risk assessment for fetal Down syndrome, trisomy 18, and neural tube defects     Comprehensive (Level II) ultrasound: Detailed ultrasound performed between 18-22 weeks gestation to screen for major birth defects and markers for aneuploidy.    We reviewed the benefits and limitations of this testing.  Screening tests provide a risk assessment specific to the pregnancy for certain fetal chromosome abnormalities, but cannot definitively diagnose or exclude a fetal chromosome abnormality.  Follow-up genetic counseling and consideration of diagnostic testing is recommended with any abnormal screening result.     Diagnostic tests carry inherent risks- including risk of miscarriage- that require careful consideration.  These tests can detect fetal chromosome abnormalities with greater than 99% certainty.  Results can be compromised by maternal cell contamination or mosaicism, and are limited by the resolution of cytogenetic G-banding technology.  There is no screening nor diagnostic test that can detect all forms of birth defects or mental disability.    It was a pleasure to be involved with Anjelica s care. Face-to-face time of the meeting was 45 minutes.    Adriana Her MS,  KAREEM  Licensed Genetic Counselor   [facility]   Maternal Fetal Medicine [facility]  [email or web address]   Office: [phone] MF: [phone]  Pager: [phone] Fax: [phone]

## 2019-03-21 NOTE — PROGRESS NOTES
Maternal-Fetal Medicine Return Visit     Anjelica Holloway  : 1995  MRN: [id number]     S:   Anjelica is feeling well today without major complaints. She denies cramping/bleeding/leaking of fluid. Feeling good fetal movements. Is having calf cramping in the middle of the night when she wakes up with her 7 month old. Planning on quad screen today after meeting with GC.      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   - Congenital vascular anomalies of lower extremities with peripheral vascular disease - history of chronic opioid prescriptions (review of [place]  on 1/15 by ALEJANDRA Arias with evidence of 90 tabs of hydrocodone/acetaminophen tabs every month by provider in [place]. Last Rx was for 90 tabs in 2018.   - H/o  section x2  - Anxiety- no meds currently  - FLNA gene mutation:  genetic evaluation of baby for possible otopalatodigital spectrum disorders after birth (per prior pregnancy with MFM)  - Tobacco abuse: 2 cig/day (previously 1/2 ppd)     O:  /61   Pulse 84   Resp 20   Wt 71.2 kg (157 lb)   LMP 10/15/2018   SpO2 98%   BMI 24.59 kg/m      Gen: NAD, well appearing  Abd: soft, gravid NTTP  Chest: Non-labored breathing  Ext: neg edema     Ultrasounds:   [facility] Comprehensive US follow up performed today, see imaging tab.      ASSESSMENT:  Anjelica Holloway is a 23 year old  at 23w3d by 14w1d US here for return visit. Short interval pregnancy, last delivered 18.     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   - Congenital vascular anomalies of lower extremities with peripheral vascular disease - history of chronic opioid prescriptions    - H/o  section x2  - Anxiety- no meds currently  - FLNA gene mutation:  genetic  evaluation of baby for possible otopalatodigital spectrum disorders after birth.   - Tobacco abuse: 2 cig/day (previously 1/2 ppd)     1. 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   - Follows with Dr. Gutierrez.   - Seen by GC today (see their note). Plans to have quad screen drawn. Also, discussed FLNA mutation which in X-linked dx can lead to more severe defects than previously reviewed with the patient - however she is already s/p a comprehensive US and follow up today without abnormal findings (spine views suboptimal). Would reassess as above after birth.      2. Vascular spasms/peripheral vascular disease  - Followed by cardiology (Dr. Gutierrez)- says next appointment likely this summer (no follow up until PP)  - Was on Sildenafil BID for symptomatic relief, due to no symptoms patient no longer on this medication   - Increased blood flow during pregnancy may actually improve her symptoms and small vessel disease.  - Continue preventive measures will continue  - Chronic pain, was on hydrocodone. Concern for diversion given that patient initially denied use, though prescribed 2018.     3. Anxiety  - No medications currently  - Pt reports symptoms current stable     4. Tobacco abuse  - currently 2 cigs/day  - Counseled patient on importance of smoking cessation in past - not addressed directly at today's visit     5. Routine PNC  - Rh pos, rubella immune, infectious labs neg. Lab for urine CT sent today.   - FLNA gene mutation:  genetic evaluation of baby for possible otopalatodigital spectrum disorders after birth (see above note about today's GC visit)  - Genetic screening: quad screen drawn today  -  testing: Q4 week growth  - Patient will need anesthesia consult   - Concern at prior visit for  "increased risk of domestic abuse. Will monitor/screen for abuse.     6. Short interval pregnancy   - Patient aware complications associated with short interval spacing  - Discussed birth control options today, patient plans on nexplanon vs. IUD      7. Delivery planning:  - Plan for repeat  section at 38 weeks   - PTL/ROM/VB/Dec FM precautions reviewed.    Alean Dowling MD  [facility] Fellow    Please see \"Imaging\" tab under \"Chart Review\" for details of today's US at the [facility].    This note, as scribed, accurately reflects the examination, my impressions and plan as discussed with the patient.    Jg Phillips MD  Maternal-Fetal Medicine            "

## 2019-03-22 LAB
BACTERIA SPEC CULT: NORMAL
C TRACH DNA SPEC QL NAA+PROBE: NEGATIVE
Lab: NORMAL
N GONORRHOEA DNA SPEC QL NAA+PROBE: NEGATIVE
SPECIMEN SOURCE: NORMAL

## 2019-03-25 ENCOUNTER — TELEPHONE (OUTPATIENT)
Dept: MATERNAL FETAL MEDICINE | Facility: CLINIC | Age: 24
End: 2019-03-25

## 2019-03-25 LAB
# FETUSES US: NORMAL
# FETUSES: NORMAL
AFP ADJ MOM AMN: 0.73
AFP SERPL-MCNC: 72 NG/ML
AGE - REPORTED: 25.1 YR
CURRENT SMOKER: NO
CURRENT SMOKER: NO
DIABETES STATUS PATIENT: NO
FAMILY MEMBER DISEASES HX: NO
FAMILY MEMBER DISEASES HX: NO
GA METHOD: NORMAL
GA METHOD: NORMAL
GA: NORMAL WK
HCG MOM SERPL: 0.62
HCG SERPL-ACNC: NORMAL IU/L
HX OF HEREDITARY DISORDERS: NO
IDDM PATIENT QL: NO
INHIBIN A MOM SERPL: 0.48
INHIBIN A SERPL-MCNC: 127 PG/ML
INTEGRATED SCN PATIENT-IMP: NORMAL
IVF PREGNANCY: NO
LMP START DATE: NORMAL
MONOCHORIONIC TWINS: NO
PATHOLOGY STUDY: NORMAL
PREV FETUS DEFECT: NO
SERVICE CMNT-IMP: NO
SPECIMEN DRAWN SERPL: NORMAL
U ESTRIOL MOM SERPL: 0.75
U ESTRIOL SERPL-MCNC: 2.5 NG/ML
VALPROIC/CARBAMAZEPINE STATUS: NO
WEIGHT UNITS: NORMAL

## 2019-03-25 NOTE — TELEPHONE ENCOUNTER
Left message with Anjelica Holloway regarding her QUAD screening results.    These results indicated a <1 in 61516 risk for Down syndrome, a 1 in 9630 risk for trisomy 18, and a <1 in 34229 for neural tube defects.    The AFP was 0.73 MoM, the Estriol (uE3) was 0.75 MoM, the hCG was 0.62 MoM, and the Dimeric Inhibin A was 0.48MoM.     This screening test gives information about the risk of some chromosome conditions in a pregnancy, but does not definitively diagnose or exclude the presence of these chromosome conditions.     A copy of these results are available in her EPIC chart for her primary OB to review.    Adriana Her MS, [facility]  Licensed Genetic Counselor   [facility]   Maternal Fetal Medicine Centers  [email or web address]   Office: [phone] [facility]: [phone]  Pager: [phone] Fax: [phone]

## 2019-04-25 ENCOUNTER — OFFICE VISIT (OUTPATIENT)
Dept: MATERNAL FETAL MEDICINE | Facility: CLINIC | Age: 24
End: 2019-04-25
Attending: OBSTETRICS & GYNECOLOGY
Payer: COMMERCIAL

## 2019-04-25 ENCOUNTER — HOSPITAL ENCOUNTER (OUTPATIENT)
Dept: ULTRASOUND IMAGING | Facility: CLINIC | Age: 24
Discharge: HOME | End: 2019-04-25
Attending: OBSTETRICS & GYNECOLOGY | Admitting: OBSTETRICS & GYNECOLOGY
Payer: COMMERCIAL

## 2019-04-25 VITALS
WEIGHT: 164.8 LBS | BODY MASS INDEX: 25.81 KG/M2 | SYSTOLIC BLOOD PRESSURE: 117 MMHG | HEART RATE: 91 BPM | DIASTOLIC BLOOD PRESSURE: 54 MMHG

## 2019-04-25 DIAGNOSIS — Z33.1 PREGNANCY, INCIDENTAL: ICD-10-CM

## 2019-04-25 DIAGNOSIS — Q79.60 EHLERS-DANLOS SYNDROME: ICD-10-CM

## 2019-04-25 LAB
ERYTHROCYTE [DISTWIDTH] IN BLOOD BY AUTOMATED COUNT: 13.3 % (ref 10–15)
GLUCOSE 1H P 50 G GLC PO SERPL-MCNC: 109 MG/DL (ref 60–129)
HCT VFR BLD AUTO: 32.3 % (ref 35–47)
HGB BLD-MCNC: 10.2 G/DL (ref 11.7–15.7)
MCH RBC QN AUTO: 28.7 PG (ref 26.5–33)
MCHC RBC AUTO-ENTMCNC: 31.6 G/DL (ref 31.5–36.5)
MCV RBC AUTO: 91 FL (ref 78–100)
PLATELET # BLD AUTO: 300 10E9/L (ref 150–450)
RBC # BLD AUTO: 3.55 10E12/L (ref 3.8–5.2)
WBC # BLD AUTO: 10.8 10E9/L (ref 4–11)

## 2019-04-25 PROCEDURE — 82950 GLUCOSE TEST: CPT | Performed by: OBSTETRICS & GYNECOLOGY

## 2019-04-25 PROCEDURE — 96372 THER/PROPH/DIAG INJ SC/IM: CPT | Mod: ZF

## 2019-04-25 PROCEDURE — 90471 IMMUNIZATION ADMIN: CPT | Mod: ZF

## 2019-04-25 PROCEDURE — 85027 COMPLETE CBC AUTOMATED: CPT | Performed by: OBSTETRICS & GYNECOLOGY

## 2019-04-25 PROCEDURE — 76816 OB US FOLLOW-UP PER FETUS: CPT

## 2019-04-25 PROCEDURE — G0463 HOSPITAL OUTPT CLINIC VISIT: HCPCS | Mod: 25,ZF

## 2019-04-25 PROCEDURE — 90715 TDAP VACCINE 7 YRS/> IM: CPT | Mod: ZF

## 2019-04-25 PROCEDURE — 25000128 H RX IP 250 OP 636: Mod: ZF

## 2019-04-25 PROCEDURE — 0064U ANTB TP TOTAL&RPR IA QUAL: CPT | Performed by: OBSTETRICS & GYNECOLOGY

## 2019-04-25 PROCEDURE — 36415 COLL VENOUS BLD VENIPUNCTURE: CPT | Performed by: OBSTETRICS & GYNECOLOGY

## 2019-04-25 NOTE — PROGRESS NOTES
Maternal-Fetal Medicine Return Visit     Anjelica Holloway  : 1995  MRN: [id number]     S:   Anjelica is feeling well today without major complaints. She denies cramping/bleeding/leaking of fluid. Feeling good fetal movements. Still having calf cramping at night, but it is improved with hydration and eating bananas. Glad the spine was visualized on ultrasound today.     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   - Congenital vascular anomalies of lower extremities with peripheral vascular disease - history of chronic opioid prescriptions (review of [place]  on 1/15 by ALEJANDRA Arias with evidence of 90 tabs of hydrocodone/acetaminophen tabs every month by provider in [place]. Last Rx was for 90 tabs in 2018).   - H/o  section x2  - Anxiety- no meds currently  - FLNA gene mutation:  genetic evaluation of baby for possible otopalatodigital spectrum disorders after birth (per prior pregnancy with MFM)  - Tobacco abuse: 2 cig/day (previously 1/2 ppd)     O:  /54 (BP Location: Left arm, Patient Position: Sitting, Cuff Size: Adult Regular)   Pulse 91   Wt 74.8 kg (164 lb 12.8 oz)   LMP 10/15/2018   BMI 25.81 kg/m      Gen: NAD, well appearing  Abd: soft, gravid NTTP  Chest: Non-labored breathing    Ultrasounds:   [facility] Comprehensive US follow up performed today, see imaging tab. Spine visualized today.     ASSESSMENT:  Anjelica Holloway is a 23 year old  at 28w3d by 14w1d US here for return visit. Short interval pregnancy, last delivered 18.     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   - Congenital vascular anomalies of lower extremities with peripheral vascular disease - history of chronic opioid prescriptions    - H/o  section x2  -  Anxiety- no meds currently  - FLNA gene mutation:  genetic evaluation of baby for possible otopalatodigital spectrum disorders after birth.   - Tobacco use: 2 cig/day (previously 1/2 ppd)     1. 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   - Follows with Dr. Gutierrez.   - S/p GC with normal quad screen.  - plan reassessment of infant after birth for defects due to FLNA mutation.       2. Vascular spasms/peripheral vascular disease  - Followed by cardiology (Dr. Gutierrez)- says next appointment likely this summer (no follow up until PP)  - Was on Sildenafil BID for symptomatic relief, due to no symptoms patient no longer on this medication   - Increased blood flow during pregnancy may actually improve her symptoms and small vessel disease.  - Continue preventive measures will continue  - Chronic pain, was on hydrocodone. Concern for diversion given that patient initially denied use, though prescribed 2018.     3. Anxiety  - No medications currently  - Pt reports symptoms current stable     4. Tobacco abuse  - currently 2 cigs/day  - Counseled patient on importance of smoking cessation in past - not addressed directly at today's visit     5. Routine PNC  - Rh pos, rubella immune, infectious labs neg. Lab for urine CT sent today.   - Third trimester labs: , Hgb 10.2, on PNV w/Fe  - FLNA gene mutation:  genetic evaluation of baby for possible otopalatodigital spectrum disorders after birth   - Genetic screening: normal quad screen, normal Level II ultrasound  -  testing: Q4 week growth  - Patient will need anesthesia consult   - Concern at prior visit for increased risk of domestic abuse. Will monitor/screen for abuse.     6. Short interval pregnancy   - Patient aware complications associated  with short interval spacing  - Discussion of birth control options at last visit, was considering Nexplanon vs IUD, today states she and her  are still talking about it.      7. Delivery planning:  - Plan for repeat  section at 38 weeks     I acted as a scribe for Dr. Ramachandran during today's visit.  Charity Vasquez  Ob/Gyn PGY-2    This note, as scribed, accurately reflects the examination, my impressions and plan as discussed with the patient.    Peggy Ramachandran MD  Specialist in Maternal-Fetal Medicine

## 2019-04-25 NOTE — PROGRESS NOTES
Pt presents to [facility] for assessment and evaluation of her pregnancy due to maternal EDS. States she is doing well at this time. States +fm, no lof or bleeding. No contractions. Us done today. Reviewed by Dr. Ramachandran. See epic for today's findings. Obv done. See flow sheet. Pt seen by Dr. Vasquez and Dr. Ramachandran. Pt did tdap and GCT today. GCT WNL, pt aware. Will return in 4 weeks for Rl2 and OBV. Knows when to come in or call with further complaints or concerns. Teresa Gallardo RN

## 2019-04-26 LAB — T PALLIDUM AB SER QL: NONREACTIVE

## 2019-05-23 ENCOUNTER — OFFICE VISIT (OUTPATIENT)
Dept: MATERNAL FETAL MEDICINE | Facility: CLINIC | Age: 24
End: 2019-05-23
Attending: OBSTETRICS & GYNECOLOGY
Payer: COMMERCIAL

## 2019-05-23 ENCOUNTER — HOSPITAL ENCOUNTER (OUTPATIENT)
Dept: ULTRASOUND IMAGING | Facility: CLINIC | Age: 24
Discharge: HOME | End: 2019-05-23
Attending: OBSTETRICS & GYNECOLOGY | Admitting: OBSTETRICS & GYNECOLOGY
Payer: COMMERCIAL

## 2019-05-23 VITALS
RESPIRATION RATE: 18 BRPM | BODY MASS INDEX: 26.12 KG/M2 | HEART RATE: 84 BPM | OXYGEN SATURATION: 98 % | DIASTOLIC BLOOD PRESSURE: 56 MMHG | WEIGHT: 166.8 LBS | SYSTOLIC BLOOD PRESSURE: 107 MMHG

## 2019-05-23 DIAGNOSIS — Q79.60 EHLERS-DANLOS SYNDROME: Primary | ICD-10-CM

## 2019-05-23 DIAGNOSIS — O40.3XX0 POLYHYDRAMNIOS AFFECTING PREGNANCY IN THIRD TRIMESTER: ICD-10-CM

## 2019-05-23 DIAGNOSIS — Q79.60 EHLERS-DANLOS SYNDROME: ICD-10-CM

## 2019-05-23 PROCEDURE — G0463 HOSPITAL OUTPT CLINIC VISIT: HCPCS | Mod: 25,ZF

## 2019-05-23 PROCEDURE — 76816 OB US FOLLOW-UP PER FETUS: CPT

## 2019-05-23 ASSESSMENT — PAIN SCALES - GENERAL: PAINLEVEL: NO PAIN (0)

## 2019-05-23 NOTE — NURSING NOTE
Anjelica here in clinic for a RL2/OBV at 32w3d for pregnancy c/b EDS, h/o c/s x2, POTS (see reports/notes). Pt reports + fetal movement, denies  LOF/Bleeding/change in discharge/cramping or ctx/HA/vision changes/SOB/chest pains/edema.  Dr. Schumer and Dr. Yoder in to meet with pt to discuss US and Plan of care. Plan is to have a limited US in 1 week for polyhydramnios and a f/u OB visit in 2 and 4 weeks. Writer will schedule c/s and anesthesia consult and will call Anjelica with appointment dates and times. Anjelica knows when to call or come in with any concerns. Pt scheduled appointments and discharged ambulatory and stable.  Fani Godinez RN

## 2019-05-28 ENCOUNTER — TELEPHONE (OUTPATIENT)
Dept: MATERNAL FETAL MEDICINE | Facility: CLINIC | Age: 24
End: 2019-05-28

## 2019-05-28 NOTE — TELEPHONE ENCOUNTER
Anjelica called back and writer informed her of Anesthesia consult on 6/18 at 10:00 and c/s scheduled for 7/1 at 10:30. Anjelica verbalized understanding and agrees with plan. There is a spot on hold for an OBV on 6/18 at 11am.   Fani Godinez RN

## 2019-05-28 NOTE — TELEPHONE ENCOUNTER
LM for Anjelica to call PCC back to inform her of her c/s date on 7/1 @ 1030 and Anesthesia consult on 6/18 at 1000. Will await call back.   Fani Godinez RN

## 2019-06-04 ENCOUNTER — HOSPITAL ENCOUNTER (OUTPATIENT)
Dept: ULTRASOUND IMAGING | Facility: CLINIC | Age: 24
Discharge: HOME | End: 2019-06-04
Attending: OBSTETRICS & GYNECOLOGY | Admitting: OBSTETRICS & GYNECOLOGY
Payer: COMMERCIAL

## 2019-06-04 ENCOUNTER — OFFICE VISIT (OUTPATIENT)
Dept: MATERNAL FETAL MEDICINE | Facility: CLINIC | Age: 24
End: 2019-06-04
Attending: OBSTETRICS & GYNECOLOGY
Payer: COMMERCIAL

## 2019-06-04 VITALS
DIASTOLIC BLOOD PRESSURE: 71 MMHG | HEART RATE: 90 BPM | BODY MASS INDEX: 26.28 KG/M2 | WEIGHT: 167.8 LBS | SYSTOLIC BLOOD PRESSURE: 118 MMHG

## 2019-06-04 DIAGNOSIS — O26.93 COMPLICATION OF PREGNANCY IN THIRD TRIMESTER: Primary | ICD-10-CM

## 2019-06-04 DIAGNOSIS — O40.3XX0 POLYHYDRAMNIOS AFFECTING PREGNANCY IN THIRD TRIMESTER: ICD-10-CM

## 2019-06-04 DIAGNOSIS — Q79.60 EHLERS-DANLOS SYNDROME: ICD-10-CM

## 2019-06-04 PROCEDURE — 87653 STREP B DNA AMP PROBE: CPT | Performed by: OBSTETRICS & GYNECOLOGY

## 2019-06-04 PROCEDURE — G0463 HOSPITAL OUTPT CLINIC VISIT: HCPCS | Mod: 25,ZF

## 2019-06-04 PROCEDURE — 76819 FETAL BIOPHYS PROFIL W/O NST: CPT

## 2019-06-04 NOTE — PROGRESS NOTES
Pt presents to [facility] for assessment and evaluation of her pregnancy due to EDS. Pt states she is doing well. States +fm, no lof or bleeding. No contractions. Us done and reviewed by Dr. Fletcher. See epic for today's findings. Obv done. GBS done. See flow sheets. Will return in 2 weeks for a Rl2 and an Obv. Will see anesthesia prior. Knows when to come in or call. Will get her soap and instructions for c/s at next visit. Discharged stable at this time. Teresa Gallardo RN
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
## 2019-06-05 DIAGNOSIS — Q79.60 EHLERS-DANLOS SYNDROME: Primary | ICD-10-CM

## 2019-06-05 LAB
GP B STREP DNA SPEC QL NAA+PROBE: NEGATIVE
SPECIMEN SOURCE: NORMAL

## 2019-06-18 ENCOUNTER — ANESTHESIA EVENT (OUTPATIENT)
Dept: ANESTHESIOLOGY | Facility: CLINIC | Age: 24
End: 2019-06-18

## 2019-06-18 ENCOUNTER — OFFICE VISIT (OUTPATIENT)
Dept: MATERNAL FETAL MEDICINE | Facility: CLINIC | Age: 24
End: 2019-06-18
Attending: OBSTETRICS & GYNECOLOGY
Payer: COMMERCIAL

## 2019-06-18 ENCOUNTER — ANESTHESIA (OUTPATIENT)
Dept: ANESTHESIOLOGY | Facility: CLINIC | Age: 24
End: 2019-06-18

## 2019-06-18 ENCOUNTER — HOSPITAL ENCOUNTER (OUTPATIENT)
Dept: ULTRASOUND IMAGING | Facility: CLINIC | Age: 24
Discharge: HOME | End: 2019-06-18
Attending: OBSTETRICS & GYNECOLOGY | Admitting: OBSTETRICS & GYNECOLOGY
Payer: COMMERCIAL

## 2019-06-18 VITALS
BODY MASS INDEX: 26.85 KG/M2 | DIASTOLIC BLOOD PRESSURE: 53 MMHG | HEART RATE: 94 BPM | WEIGHT: 171.4 LBS | SYSTOLIC BLOOD PRESSURE: 119 MMHG

## 2019-06-18 DIAGNOSIS — Q79.60 EHLERS-DANLOS SYNDROME: ICD-10-CM

## 2019-06-18 DIAGNOSIS — O26.93 COMPLICATION OF PREGNANCY IN THIRD TRIMESTER: Primary | ICD-10-CM

## 2019-06-18 PROCEDURE — 76816 OB US FOLLOW-UP PER FETUS: CPT

## 2019-06-18 PROCEDURE — G0463 HOSPITAL OUTPT CLINIC VISIT: HCPCS | Mod: 25,ZF

## 2019-06-18 NOTE — PROGRESS NOTES
Pt presents to maternal fetal medicine for assessment and evaluation of her pregnancy due to EDS. Pt states she is doing well. Offers no complaints at this time. States +fm, no lof or bleeding. No contractions. Us done and reviewed by Dr. Lombardo. See epic for today's results. Pt had obv done. See flow sheets. Pt given information on c/s that is set for 7/2 at 10:30 and information on showering prior to surgery. Pt given soap also. States understating. Will call with any further questions. Discharged stable at this time. Pt also met with anesthesia prior to appointment today. Teresa Gallardo RN

## 2019-06-18 NOTE — PROGRESS NOTES
Maternal-Fetal Medicine Return Visit     Anjelica Holloway  : 1995  MRN: [id number]     S:   Anjelica is feeling well today and reports no concerns. Notes occasional non-painful jad-olivares contractions. She denies cramping/bleeding/leaking of fluid. Feeling good fetal movements.      She is accompanied today by her mother.      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 Short interval pregnancy, last delivered 18.      1. 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  Thus, there appears to be essentially a one-vessel runoff to both feet.   CT angio ([place]): Atretic but patent anterior tibial arteries bilaterally, absent left posterior tibial and atretic right posterior tibial arteries with prominent peroneal arteries that extend to the medial aspect of the ankle bilaterally.  MRI ([place]): 1. Occlusion of both posterior tibial arteries occurring in the midcalf on the right and the upper calf on the left. 2. Hypoplastic bilateral anterior tibial arteries with patent flow into the feet.  3. Prominent peroneal arteries bilaterally. Hypoplastic left anterior tibial artery and absent left posterior tibial artery; hypoplastic right posterior tibial artery with occlusive small vessel disease.   - Follows with Dr. Gutierrez (last seen 17).   - Genetic testing: negative for COL3A1, TGFBR1/2 and SMAD3, negative thoracic and abdominal aneurysm (ARIC) panel ( ACTA2, CBS, COL3A1, COL5A1, COL5A2, FBN1, FBN2, FLNA, MED12, MYH11, SKI, [id number], SMAD3, TGFB2, TGFBR1/2)   - Found to have a variant of possible clinical significance in FLNA (FLNA:[id number] c.1342C>A p.[id number] Heterozygous)   - Plan for  genetic evaluation of baby for possible otopalatodigital spectrum disorders after birth      2. Vascular spasms/peripheral vascular disease  - Followed by cardiology (Dr. Gutierrez)  - Was on Sildenafil BID for symptomatic relief, due to no symptoms patient no longer on this medication   - Increased blood flow during pregnancy may actually improve her symptoms and small vessel disease   - Chronic pain, was on hydrocodone, prescribed through 2018     3. Anxiety  - No medications currently  - Pt reports symptoms current stable  - Consider early postpartum visit     4. Tobacco use disorder, quit about 2-3 months ago  - No longer smoking cigarettes     5. Routine PNC  - Rh pos, rubella immune, infectious labs neg.   - Third trimester labs: , Hgb 10.2  - GBS neg  - Continue PNV w/Fe  - FLNA gene mutation:  genetic evaluation of baby for possible otopalatodigital spectrum disorders after birth   - Genetic screening: normal quad screen, normal Level II ultrasound     6. Delivery planning:  - Note short interval pregnancy  - Plan for repeat  section at 38 weeks; scheduled for 19  - Anesthesiology consultation done today  - Does not desire BTL, planning Mirena IUD, risks have been previously discussed   - Labor precautions given    I served as a scribe for Dr. Munira Sheriff,  MD  Maternal-Fetal Medicine fellow  June 18, 2019     MFM Attending Attestation    I have seen and evaluated the patient myself. I spent a total of 5 minutes face-to-face with Anjelica Holloway during today's office visit. Over 50% of this time was spent counseling the patient and/or coordinating care regarding type 3 EDS. See note for details; I have made the necessary edits/additions.    Mary Lombardo MD  Maternal Fetal Medicine

## 2019-06-18 NOTE — ANESTHESIA PREPROCEDURE EVALUATION
Anesthesia Evaluation     . Pt has had prior anesthetic. Type: Regional    No history of anesthetic complications          ROS/MED HX    ENT/Pulmonary:       Neurologic:       Cardiovascular: Comment: POTS Syndrome        METS/Exercise Tolerance:  >4 METS   Hematologic: Comments: Peripheral Artery Disease, Congenital Vascular Abnormalities (anomaly of blood flow to lower extremities) - patient taking silfenafil 20mg QD        Musculoskeletal: Comment: EDS with joint hypermobility        GI/Hepatic: Comment: Meckel's diverticulum    (+) GERD (Peptic Ulcer Disease)       Renal/Genitourinary:         Endo:         Psychiatric: Comment: ADHD    (+) psychiatric history other (comment)      Infectious Disease:         Malignancy:         Other: Comment: 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, vascular malformation                    JZG FV AN PHYSICAL EXAM    Assessment:   ASA SCORE: 3            Tobacco Use:  NO Active use of Tobacco/UNKNOWN Tobacco use status     Plan:   Anes. Type:  Regional; MAC     RA Details:  Labor/OB Procedure; SS     RA-Location/Type: CSE   Pre-Induction: None     Drips/Meds-Preparation: Oxytocin   Induction:  Not applicable   Airway: Native Airway   Access/Monitoring: PIV   Maintenance: N/a   Emergence: Not Applicable   Logistics: Observation/Admission     Postop Pain/Sedation Strategy:  Standard-Options: Block SS     PONV Management:  Adult Risk Factors: Female, Non-Smoker     CONSENT: Direct conversation   Plan and risks discussed with: Patient   Blood Products: Consented (ALL Blood Products)       Comments for Plan/Consent:  Attending Anesthesiologist Anesthesia Assessment:  23 YO F presents for OB Anesthesia consultation due to EDS and scheduled  2019. She has a history of two prior c-sections in 2015 and 2018 both performed under spinal anesthesia.. She is currently taking sildenafil 20mg BID due to vascular anomalies in b/l LE and PAD.  She takes this  daily prior to pregnancy and BID during pregnancy.  She does not have a history of DVT or PE.  She does not need to stop taking her sildenafil as this is not a contraindication to intrathecal injection.    Her last CS was prolonged due to surgical/placental factors and she ended up having a lot of pain intraoperatively and was administered Fentanyl, Ketamine and Propofol IV.  She would not like to have this experience again.  We discussed that since this is her 3rd CS and being that the second one was prolonged, we would place a combined spinal epidural instead of just a single injection spinal so that if this one ends up being prolonged she will not have to get so much IV medication and feel pain. We can use the epidural to prolonged the surgical anesthetic to hopefully avoid this outcome.  This was her main concern today.   She has not had any changes to her medical history in regards to her EDS, POTS ( no issues with fainting in this pregnancy), or her PAD.      Will plan for CSE for her 3rd repeat CS.     Gretel Lundy MD  June 18, 2019

## 2019-07-01 ENCOUNTER — HOSPITAL ENCOUNTER (INPATIENT)
Facility: CLINIC | Age: 24
LOS: 3 days | Discharge: HOME HEALTH | End: 2019-07-04
Attending: OBSTETRICS & GYNECOLOGY | Admitting: OBSTETRICS & GYNECOLOGY
Payer: MEDICAID

## 2019-07-01 ENCOUNTER — ANESTHESIA EVENT (OUTPATIENT)
Dept: OBGYN | Facility: CLINIC | Age: 24
End: 2019-07-01
Payer: MEDICAID

## 2019-07-01 ENCOUNTER — ANESTHESIA (OUTPATIENT)
Dept: OBGYN | Facility: CLINIC | Age: 24
End: 2019-07-01
Payer: MEDICAID

## 2019-07-01 DIAGNOSIS — Z98.891 S/P C-SECTION: Primary | ICD-10-CM

## 2019-07-01 DIAGNOSIS — Z98.891 S/P CESAREAN SECTION: ICD-10-CM

## 2019-07-01 LAB
ABO + RH BLD: NORMAL
ABO + RH BLD: NORMAL
BASOPHILS # BLD AUTO: 0 10E9/L (ref 0–0.2)
BASOPHILS NFR BLD AUTO: 0.2 %
BLD GP AB SCN SERPL QL: NORMAL
BLOOD BANK CMNT PATIENT-IMP: NORMAL
DIFFERENTIAL METHOD BLD: ABNORMAL
EOSINOPHIL # BLD AUTO: 0.1 10E9/L (ref 0–0.7)
EOSINOPHIL NFR BLD AUTO: 0.7 %
ERYTHROCYTE [DISTWIDTH] IN BLOOD BY AUTOMATED COUNT: 14.5 % (ref 10–15)
HCT VFR BLD AUTO: 32.2 % (ref 35–47)
HGB BLD-MCNC: 9.9 G/DL (ref 11.7–15.7)
IMM GRANULOCYTES # BLD: 0.1 10E9/L (ref 0–0.4)
IMM GRANULOCYTES NFR BLD: 0.7 %
LYMPHOCYTES # BLD AUTO: 2.3 10E9/L (ref 0.8–5.3)
LYMPHOCYTES NFR BLD AUTO: 19.9 %
MCH RBC QN AUTO: 26.5 PG (ref 26.5–33)
MCHC RBC AUTO-ENTMCNC: 30.7 G/DL (ref 31.5–36.5)
MCV RBC AUTO: 86 FL (ref 78–100)
MONOCYTES # BLD AUTO: 0.6 10E9/L (ref 0–1.3)
MONOCYTES NFR BLD AUTO: 5.6 %
NEUTROPHILS # BLD AUTO: 8.3 10E9/L (ref 1.6–8.3)
NEUTROPHILS NFR BLD AUTO: 72.9 %
NRBC # BLD AUTO: 0 10*3/UL
NRBC BLD AUTO-RTO: 0 /100
PLATELET # BLD AUTO: 304 10E9/L (ref 150–450)
RBC # BLD AUTO: 3.73 10E12/L (ref 3.8–5.2)
SPECIMEN EXP DATE BLD: NORMAL
T PALLIDUM AB SER QL: NONREACTIVE
WBC # BLD AUTO: 11.4 10E9/L (ref 4–11)

## 2019-07-01 PROCEDURE — 36000059 ZZH SURGERY LEVEL 3 EA 15 ADDTL MIN UMMC: Performed by: OBSTETRICS & GYNECOLOGY

## 2019-07-01 PROCEDURE — 86900 BLOOD TYPING SEROLOGIC ABO: CPT | Performed by: STUDENT IN AN ORGANIZED HEALTH CARE EDUCATION/TRAINING PROGRAM

## 2019-07-01 PROCEDURE — 25000132 ZZH RX MED GY IP 250 OP 250 PS 637: Performed by: STUDENT IN AN ORGANIZED HEALTH CARE EDUCATION/TRAINING PROGRAM

## 2019-07-01 PROCEDURE — 25000128 H RX IP 250 OP 636: Performed by: STUDENT IN AN ORGANIZED HEALTH CARE EDUCATION/TRAINING PROGRAM

## 2019-07-01 PROCEDURE — 40000170 ZZH STATISTIC PRE-PROCEDURE ASSESSMENT II: Performed by: OBSTETRICS & GYNECOLOGY

## 2019-07-01 PROCEDURE — 88307 TISSUE EXAM BY PATHOLOGIST: CPT | Performed by: OBSTETRICS & GYNECOLOGY

## 2019-07-01 PROCEDURE — 27210794 ZZH OR GENERAL SUPPLY STERILE: Performed by: OBSTETRICS & GYNECOLOGY

## 2019-07-01 PROCEDURE — C9290 INJ, BUPIVACAINE LIPOSOME: HCPCS | Performed by: STUDENT IN AN ORGANIZED HEALTH CARE EDUCATION/TRAINING PROGRAM

## 2019-07-01 PROCEDURE — 36000057 ZZH SURGERY LEVEL 3 1ST 30 MIN - UMMC: Performed by: OBSTETRICS & GYNECOLOGY

## 2019-07-01 PROCEDURE — 40000977 ZZH STATISTIC ATTENDANCE AT DELIVERY

## 2019-07-01 PROCEDURE — 12000001 ZZH R&B MED SURG/OB UMMC

## 2019-07-01 PROCEDURE — 25000566 ZZH SEVOFLURANE, EA 15 MIN: Performed by: OBSTETRICS & GYNECOLOGY

## 2019-07-01 PROCEDURE — 86901 BLOOD TYPING SEROLOGIC RH(D): CPT | Performed by: STUDENT IN AN ORGANIZED HEALTH CARE EDUCATION/TRAINING PROGRAM

## 2019-07-01 PROCEDURE — 37000009 ZZH ANESTHESIA TECHNICAL FEE, EACH ADDTL 15 MIN: Performed by: OBSTETRICS & GYNECOLOGY

## 2019-07-01 PROCEDURE — 25800030 ZZH RX IP 258 OP 636: Performed by: STUDENT IN AN ORGANIZED HEALTH CARE EDUCATION/TRAINING PROGRAM

## 2019-07-01 PROCEDURE — C1765 ADHESION BARRIER: HCPCS | Performed by: OBSTETRICS & GYNECOLOGY

## 2019-07-01 PROCEDURE — 37000008 ZZH ANESTHESIA TECHNICAL FEE, 1ST 30 MIN: Performed by: OBSTETRICS & GYNECOLOGY

## 2019-07-01 PROCEDURE — 86780 TREPONEMA PALLIDUM: CPT | Performed by: STUDENT IN AN ORGANIZED HEALTH CARE EDUCATION/TRAINING PROGRAM

## 2019-07-01 PROCEDURE — 27110028 ZZH OR GENERAL SUPPLY NON-STERILE: Performed by: OBSTETRICS & GYNECOLOGY

## 2019-07-01 PROCEDURE — 88307 TISSUE EXAM BY PATHOLOGIST: CPT | Mod: 26 | Performed by: OBSTETRICS & GYNECOLOGY

## 2019-07-01 PROCEDURE — 36415 COLL VENOUS BLD VENIPUNCTURE: CPT | Performed by: STUDENT IN AN ORGANIZED HEALTH CARE EDUCATION/TRAINING PROGRAM

## 2019-07-01 PROCEDURE — 71000014 ZZH RECOVERY PHASE 1 LEVEL 2 FIRST HR: Performed by: OBSTETRICS & GYNECOLOGY

## 2019-07-01 PROCEDURE — 85025 COMPLETE CBC W/AUTO DIFF WBC: CPT | Performed by: STUDENT IN AN ORGANIZED HEALTH CARE EDUCATION/TRAINING PROGRAM

## 2019-07-01 PROCEDURE — 25000125 ZZHC RX 250: Performed by: ANESTHESIOLOGY

## 2019-07-01 PROCEDURE — 86850 RBC ANTIBODY SCREEN: CPT | Performed by: STUDENT IN AN ORGANIZED HEALTH CARE EDUCATION/TRAINING PROGRAM

## 2019-07-01 PROCEDURE — 40000275 ZZH STATISTIC RCP TIME EA 10 MIN

## 2019-07-01 PROCEDURE — 25000125 ZZHC RX 250: Performed by: STUDENT IN AN ORGANIZED HEALTH CARE EDUCATION/TRAINING PROGRAM

## 2019-07-01 RX ORDER — LIDOCAINE HYDROCHLORIDE AND EPINEPHRINE BITARTRATE 20; .01 MG/ML; MG/ML
INJECTION, SOLUTION SUBCUTANEOUS PRN
Status: DISCONTINUED | OUTPATIENT
Start: 2019-07-01 | End: 2019-07-01

## 2019-07-01 RX ORDER — SODIUM CHLORIDE, SODIUM LACTATE, POTASSIUM CHLORIDE, CALCIUM CHLORIDE 600; 310; 30; 20 MG/100ML; MG/100ML; MG/100ML; MG/100ML
INJECTION, SOLUTION INTRAVENOUS CONTINUOUS
Status: DISCONTINUED | OUTPATIENT
Start: 2019-07-01 | End: 2019-07-04 | Stop reason: HOSPADM

## 2019-07-01 RX ORDER — ACETAMINOPHEN 650 MG/1
650 SUPPOSITORY RECTAL ONCE
Status: DISCONTINUED | OUTPATIENT
Start: 2019-07-01 | End: 2019-07-01 | Stop reason: HOSPADM

## 2019-07-01 RX ORDER — AMOXICILLIN 250 MG
1 CAPSULE ORAL 2 TIMES DAILY PRN
Status: DISCONTINUED | OUTPATIENT
Start: 2019-07-01 | End: 2019-07-04 | Stop reason: HOSPADM

## 2019-07-01 RX ORDER — CEFAZOLIN SODIUM 2 G/100ML
2 INJECTION, SOLUTION INTRAVENOUS
Status: DISCONTINUED | OUTPATIENT
Start: 2019-07-01 | End: 2019-07-01 | Stop reason: HOSPADM

## 2019-07-01 RX ORDER — CITRIC ACID/SODIUM CITRATE 334-500MG
30 SOLUTION, ORAL ORAL ONCE
Status: DISCONTINUED | OUTPATIENT
Start: 2019-07-01 | End: 2019-07-01

## 2019-07-01 RX ORDER — NALBUPHINE HYDROCHLORIDE 10 MG/ML
2.5-5 INJECTION, SOLUTION INTRAMUSCULAR; INTRAVENOUS; SUBCUTANEOUS EVERY 6 HOURS PRN
Status: DISCONTINUED | OUTPATIENT
Start: 2019-07-01 | End: 2019-07-01

## 2019-07-01 RX ORDER — CEFAZOLIN SODIUM 1 G/3ML
1 INJECTION, POWDER, FOR SOLUTION INTRAMUSCULAR; INTRAVENOUS SEE ADMIN INSTRUCTIONS
Status: DISCONTINUED | OUTPATIENT
Start: 2019-07-01 | End: 2019-07-01 | Stop reason: HOSPADM

## 2019-07-01 RX ORDER — NALOXONE HYDROCHLORIDE 0.4 MG/ML
.1-.4 INJECTION, SOLUTION INTRAMUSCULAR; INTRAVENOUS; SUBCUTANEOUS
Status: DISCONTINUED | OUTPATIENT
Start: 2019-07-01 | End: 2019-07-04 | Stop reason: HOSPADM

## 2019-07-01 RX ORDER — EPHEDRINE SULFATE 50 MG/ML
5 INJECTION, SOLUTION INTRAMUSCULAR; INTRAVENOUS; SUBCUTANEOUS
Status: DISCONTINUED | OUTPATIENT
Start: 2019-07-01 | End: 2019-07-01

## 2019-07-01 RX ORDER — FENTANYL CITRATE 50 UG/ML
INJECTION, SOLUTION INTRAMUSCULAR; INTRAVENOUS PRN
Status: DISCONTINUED | OUTPATIENT
Start: 2019-07-01 | End: 2019-07-01

## 2019-07-01 RX ORDER — BISACODYL 10 MG
10 SUPPOSITORY, RECTAL RECTAL DAILY PRN
Status: DISCONTINUED | OUTPATIENT
Start: 2019-07-03 | End: 2019-07-04 | Stop reason: HOSPADM

## 2019-07-01 RX ORDER — FLUMAZENIL 0.1 MG/ML
0.2 INJECTION, SOLUTION INTRAVENOUS
Status: CANCELLED | OUTPATIENT
Start: 2019-07-01

## 2019-07-01 RX ORDER — ONDANSETRON 2 MG/ML
4 INJECTION INTRAMUSCULAR; INTRAVENOUS EVERY 30 MIN PRN
Status: DISCONTINUED | OUTPATIENT
Start: 2019-07-01 | End: 2019-07-01

## 2019-07-01 RX ORDER — CARBOPROST TROMETHAMINE 250 UG/ML
250 INJECTION, SOLUTION INTRAMUSCULAR
Status: DISCONTINUED | OUTPATIENT
Start: 2019-07-01 | End: 2019-07-04 | Stop reason: HOSPADM

## 2019-07-01 RX ORDER — NALOXONE HYDROCHLORIDE 0.4 MG/ML
.1-.4 INJECTION, SOLUTION INTRAMUSCULAR; INTRAVENOUS; SUBCUTANEOUS
Status: DISCONTINUED | OUTPATIENT
Start: 2019-07-01 | End: 2019-07-01

## 2019-07-01 RX ORDER — MEPERIDINE HYDROCHLORIDE 25 MG/ML
12.5 INJECTION INTRAMUSCULAR; INTRAVENOUS; SUBCUTANEOUS
Status: DISCONTINUED | OUTPATIENT
Start: 2019-07-01 | End: 2019-07-04 | Stop reason: HOSPADM

## 2019-07-01 RX ORDER — DEXTROSE, SODIUM CHLORIDE, SODIUM LACTATE, POTASSIUM CHLORIDE, AND CALCIUM CHLORIDE 5; .6; .31; .03; .02 G/100ML; G/100ML; G/100ML; G/100ML; G/100ML
INJECTION, SOLUTION INTRAVENOUS CONTINUOUS
Status: DISCONTINUED | OUTPATIENT
Start: 2019-07-01 | End: 2019-07-04 | Stop reason: HOSPADM

## 2019-07-01 RX ORDER — CITRIC ACID/SODIUM CITRATE 334-500MG
30 SOLUTION, ORAL ORAL
Status: COMPLETED | OUTPATIENT
Start: 2019-07-01 | End: 2019-07-01

## 2019-07-01 RX ORDER — OXYTOCIN/0.9 % SODIUM CHLORIDE 30/500 ML
100 PLASTIC BAG, INJECTION (ML) INTRAVENOUS CONTINUOUS
Status: DISCONTINUED | OUTPATIENT
Start: 2019-07-01 | End: 2019-07-04 | Stop reason: HOSPADM

## 2019-07-01 RX ORDER — OXYTOCIN 10 [USP'U]/ML
10 INJECTION, SOLUTION INTRAMUSCULAR; INTRAVENOUS
Status: DISCONTINUED | OUTPATIENT
Start: 2019-07-01 | End: 2019-07-04 | Stop reason: HOSPADM

## 2019-07-01 RX ORDER — OXYTOCIN/0.9 % SODIUM CHLORIDE 30/500 ML
340 PLASTIC BAG, INJECTION (ML) INTRAVENOUS CONTINUOUS PRN
Status: DISCONTINUED | OUTPATIENT
Start: 2019-07-01 | End: 2019-07-04 | Stop reason: HOSPADM

## 2019-07-01 RX ORDER — IBUPROFEN 800 MG/1
800 TABLET, FILM COATED ORAL EVERY 6 HOURS PRN
Status: DISCONTINUED | OUTPATIENT
Start: 2019-07-02 | End: 2019-07-02

## 2019-07-01 RX ORDER — LIDOCAINE 40 MG/G
CREAM TOPICAL
Status: DISCONTINUED | OUTPATIENT
Start: 2019-07-01 | End: 2019-07-01 | Stop reason: HOSPADM

## 2019-07-01 RX ORDER — HYDROCORTISONE 2.5 %
CREAM (GRAM) TOPICAL 3 TIMES DAILY PRN
Status: DISCONTINUED | OUTPATIENT
Start: 2019-07-01 | End: 2019-07-04 | Stop reason: HOSPADM

## 2019-07-01 RX ORDER — PHENYLEPHRINE HCL IN 0.9% NACL 1 MG/10 ML
SYRINGE (ML) INTRAVENOUS CONTINUOUS PRN
Status: DISCONTINUED | OUTPATIENT
Start: 2019-07-01 | End: 2019-07-01

## 2019-07-01 RX ORDER — HYDROCODONE BITARTRATE AND ACETAMINOPHEN 5; 325 MG/1; MG/1
1-2 TABLET ORAL EVERY 4 HOURS PRN
Status: DISCONTINUED | OUTPATIENT
Start: 2019-07-01 | End: 2019-07-04 | Stop reason: HOSPADM

## 2019-07-01 RX ORDER — MISOPROSTOL 200 UG/1
400 TABLET ORAL
Status: DISCONTINUED | OUTPATIENT
Start: 2019-07-01 | End: 2019-07-04 | Stop reason: HOSPADM

## 2019-07-01 RX ORDER — ONDANSETRON 2 MG/ML
4 INJECTION INTRAMUSCULAR; INTRAVENOUS EVERY 6 HOURS PRN
Status: DISCONTINUED | OUTPATIENT
Start: 2019-07-01 | End: 2019-07-04 | Stop reason: HOSPADM

## 2019-07-01 RX ORDER — MORPHINE SULFATE 1 MG/ML
100 INJECTION, SOLUTION EPIDURAL; INTRATHECAL; INTRAVENOUS ONCE
Status: DISCONTINUED | OUTPATIENT
Start: 2019-07-01 | End: 2019-07-01

## 2019-07-01 RX ORDER — EPHEDRINE SULFATE 50 MG/ML
INJECTION, SOLUTION INTRAMUSCULAR; INTRAVENOUS; SUBCUTANEOUS PRN
Status: DISCONTINUED | OUTPATIENT
Start: 2019-07-01 | End: 2019-07-01

## 2019-07-01 RX ORDER — ACETAMINOPHEN 325 MG/1
975 TABLET ORAL EVERY 8 HOURS
Status: DISCONTINUED | OUTPATIENT
Start: 2019-07-01 | End: 2019-07-01

## 2019-07-01 RX ORDER — LIDOCAINE 40 MG/G
CREAM TOPICAL
Status: DISCONTINUED | OUTPATIENT
Start: 2019-07-01 | End: 2019-07-04 | Stop reason: HOSPADM

## 2019-07-01 RX ORDER — ONDANSETRON 2 MG/ML
INJECTION INTRAMUSCULAR; INTRAVENOUS PRN
Status: DISCONTINUED | OUTPATIENT
Start: 2019-07-01 | End: 2019-07-01

## 2019-07-01 RX ORDER — ACETAMINOPHEN 325 MG/1
650 TABLET ORAL EVERY 4 HOURS PRN
Status: DISCONTINUED | OUTPATIENT
Start: 2019-07-01 | End: 2019-07-04 | Stop reason: HOSPADM

## 2019-07-01 RX ORDER — KETOROLAC TROMETHAMINE 30 MG/ML
30 INJECTION, SOLUTION INTRAMUSCULAR; INTRAVENOUS EVERY 6 HOURS
Status: COMPLETED | OUTPATIENT
Start: 2019-07-01 | End: 2019-07-02

## 2019-07-01 RX ORDER — FENTANYL CITRATE 50 UG/ML
25-50 INJECTION, SOLUTION INTRAMUSCULAR; INTRAVENOUS
Status: CANCELLED | OUTPATIENT
Start: 2019-07-01

## 2019-07-01 RX ORDER — NALOXONE HYDROCHLORIDE 0.4 MG/ML
.1-.4 INJECTION, SOLUTION INTRAMUSCULAR; INTRAVENOUS; SUBCUTANEOUS
Status: CANCELLED | OUTPATIENT
Start: 2019-07-01

## 2019-07-01 RX ORDER — ACETAMINOPHEN 325 MG/1
650 TABLET ORAL EVERY 4 HOURS PRN
Status: DISCONTINUED | OUTPATIENT
Start: 2019-07-04 | End: 2019-07-01

## 2019-07-01 RX ORDER — ONDANSETRON 4 MG/1
4 TABLET, ORALLY DISINTEGRATING ORAL EVERY 30 MIN PRN
Status: DISCONTINUED | OUTPATIENT
Start: 2019-07-01 | End: 2019-07-04 | Stop reason: HOSPADM

## 2019-07-01 RX ORDER — BUPIVACAINE HYDROCHLORIDE 7.5 MG/ML
1.6 INJECTION, SOLUTION EPIDURAL; RETROBULBAR ONCE
Status: DISCONTINUED | OUTPATIENT
Start: 2019-07-01 | End: 2019-07-01

## 2019-07-01 RX ORDER — AMOXICILLIN 250 MG
2 CAPSULE ORAL 2 TIMES DAILY PRN
Status: DISCONTINUED | OUTPATIENT
Start: 2019-07-01 | End: 2019-07-04 | Stop reason: HOSPADM

## 2019-07-01 RX ORDER — MORPHINE SULFATE 1 MG/ML
INJECTION, SOLUTION EPIDURAL; INTRATHECAL; INTRAVENOUS PRN
Status: DISCONTINUED | OUTPATIENT
Start: 2019-07-01 | End: 2019-07-01

## 2019-07-01 RX ORDER — LIDOCAINE 40 MG/G
CREAM TOPICAL
Status: DISCONTINUED | OUTPATIENT
Start: 2019-07-01 | End: 2019-07-01

## 2019-07-01 RX ORDER — BUPIVACAINE HYDROCHLORIDE 7.5 MG/ML
INJECTION, SOLUTION INTRASPINAL PRN
Status: DISCONTINUED | OUTPATIENT
Start: 2019-07-01 | End: 2019-07-01

## 2019-07-01 RX ORDER — SODIUM CHLORIDE, SODIUM LACTATE, POTASSIUM CHLORIDE, CALCIUM CHLORIDE 600; 310; 30; 20 MG/100ML; MG/100ML; MG/100ML; MG/100ML
INJECTION, SOLUTION INTRAVENOUS CONTINUOUS
Status: DISCONTINUED | OUTPATIENT
Start: 2019-07-01 | End: 2019-07-01 | Stop reason: HOSPADM

## 2019-07-01 RX ORDER — OXYCODONE HYDROCHLORIDE 5 MG/1
5-10 TABLET ORAL
Status: DISCONTINUED | OUTPATIENT
Start: 2019-07-01 | End: 2019-07-01

## 2019-07-01 RX ORDER — LIDOCAINE HYDROCHLORIDE 20 MG/ML
INJECTION, SOLUTION INFILTRATION; PERINEURAL PRN
Status: DISCONTINUED | OUTPATIENT
Start: 2019-07-01 | End: 2019-07-01

## 2019-07-01 RX ORDER — LANOLIN 100 %
OINTMENT (GRAM) TOPICAL
Status: DISCONTINUED | OUTPATIENT
Start: 2019-07-01 | End: 2019-07-04 | Stop reason: HOSPADM

## 2019-07-01 RX ORDER — HYDROMORPHONE HYDROCHLORIDE 1 MG/ML
.3-.5 INJECTION, SOLUTION INTRAMUSCULAR; INTRAVENOUS; SUBCUTANEOUS EVERY 10 MIN PRN
Status: DISCONTINUED | OUTPATIENT
Start: 2019-07-01 | End: 2019-07-04 | Stop reason: HOSPADM

## 2019-07-01 RX ORDER — FENTANYL CITRATE 50 UG/ML
10 INJECTION, SOLUTION INTRAMUSCULAR; INTRAVENOUS ONCE
Status: DISCONTINUED | OUTPATIENT
Start: 2019-07-01 | End: 2019-07-01

## 2019-07-01 RX ORDER — BUPIVACAINE HYDROCHLORIDE AND EPINEPHRINE 2.5; 5 MG/ML; UG/ML
INJECTION, SOLUTION INFILTRATION; PERINEURAL PRN
Status: DISCONTINUED | OUTPATIENT
Start: 2019-07-01 | End: 2019-07-01

## 2019-07-01 RX ORDER — SIMETHICONE 80 MG
80 TABLET,CHEWABLE ORAL 4 TIMES DAILY PRN
Status: DISCONTINUED | OUTPATIENT
Start: 2019-07-01 | End: 2019-07-04 | Stop reason: HOSPADM

## 2019-07-01 RX ORDER — FENTANYL CITRATE 50 UG/ML
25-50 INJECTION, SOLUTION INTRAMUSCULAR; INTRAVENOUS
Status: DISCONTINUED | OUTPATIENT
Start: 2019-07-01 | End: 2019-07-01 | Stop reason: HOSPADM

## 2019-07-01 RX ORDER — OXYTOCIN/0.9 % SODIUM CHLORIDE 30/500 ML
PLASTIC BAG, INJECTION (ML) INTRAVENOUS CONTINUOUS PRN
Status: DISCONTINUED | OUTPATIENT
Start: 2019-07-01 | End: 2019-07-01

## 2019-07-01 RX ORDER — HYDROCODONE BITARTRATE AND ACETAMINOPHEN 5; 325 MG/1; MG/1
1 TABLET ORAL EVERY 6 HOURS PRN
Status: DISCONTINUED | OUTPATIENT
Start: 2019-07-01 | End: 2019-07-01

## 2019-07-01 RX ADMIN — SODIUM CHLORIDE, POTASSIUM CHLORIDE, SODIUM LACTATE AND CALCIUM CHLORIDE: 600; 310; 30; 20 INJECTION, SOLUTION INTRAVENOUS at 12:34

## 2019-07-01 RX ADMIN — Medication 5 MG: at 11:42

## 2019-07-01 RX ADMIN — Medication 0.5 MCG/MIN: at 11:16

## 2019-07-01 RX ADMIN — Medication 5 ML: at 11:56

## 2019-07-01 RX ADMIN — KETOROLAC TROMETHAMINE 30 MG: 30 INJECTION, SOLUTION INTRAMUSCULAR at 18:00

## 2019-07-01 RX ADMIN — OXYTOCIN-SODIUM CHLORIDE 0.9% IV SOLN 30 UNIT/500ML 300 ML/HR: 30-0.9/5 SOLUTION at 11:43

## 2019-07-01 RX ADMIN — SODIUM CHLORIDE, POTASSIUM CHLORIDE, SODIUM LACTATE AND CALCIUM CHLORIDE: 600; 310; 30; 20 INJECTION, SOLUTION INTRAVENOUS at 11:09

## 2019-07-01 RX ADMIN — BUPIVACAINE HYDROCHLORIDE AND EPINEPHRINE BITARTRATE 20 ML: 2.5; .005 INJECTION, SOLUTION INFILTRATION; PERINEURAL at 13:00

## 2019-07-01 RX ADMIN — ACETAMINOPHEN 975 MG: 325 TABLET, FILM COATED ORAL at 14:02

## 2019-07-01 RX ADMIN — BUPIVACAINE 20 ML: 13.3 INJECTION, SUSPENSION, LIPOSOMAL INFILTRATION at 13:00

## 2019-07-01 RX ADMIN — OXYCODONE HYDROCHLORIDE 10 MG: 5 TABLET ORAL at 15:33

## 2019-07-01 RX ADMIN — LIDOCAINE HYDROCHLORIDE 5 ML: 20 INJECTION, SOLUTION INFILTRATION; PERINEURAL at 12:01

## 2019-07-01 RX ADMIN — Medication 5 ML: at 11:52

## 2019-07-01 RX ADMIN — SODIUM CITRATE AND CITRIC ACID MONOHYDRATE 30 ML: 500; 334 SOLUTION ORAL at 10:46

## 2019-07-01 RX ADMIN — HYDROCODONE BITARTRATE AND ACETAMINOPHEN 2 TABLET: 5; 325 TABLET ORAL at 23:01

## 2019-07-01 RX ADMIN — FENTANYL CITRATE 15 MCG: 50 INJECTION, SOLUTION INTRAMUSCULAR; INTRAVENOUS at 11:15

## 2019-07-01 RX ADMIN — OXYTOCIN-SODIUM CHLORIDE 0.9% IV SOLN 30 UNIT/500ML: 30-0.9/5 SOLUTION at 14:27

## 2019-07-01 RX ADMIN — BUPIVACAINE HYDROCHLORIDE IN DEXTROSE 2 ML: 7.5 INJECTION, SOLUTION SUBARACHNOID at 11:15

## 2019-07-01 RX ADMIN — FENTANYL CITRATE 25 MCG: 50 INJECTION INTRAMUSCULAR; INTRAVENOUS at 14:04

## 2019-07-01 RX ADMIN — ONDANSETRON 4 MG: 2 INJECTION INTRAMUSCULAR; INTRAVENOUS at 11:47

## 2019-07-01 RX ADMIN — SENNOSIDES AND DOCUSATE SODIUM 1 TABLET: 8.6; 5 TABLET ORAL at 19:17

## 2019-07-01 RX ADMIN — HYDROCODONE BITARTRATE AND ACETAMINOPHEN 2 TABLET: 5; 325 TABLET ORAL at 19:17

## 2019-07-01 RX ADMIN — MORPHINE SULFATE 10 MG: 1 INJECTION, SOLUTION EPIDURAL; INTRATHECAL; INTRAVENOUS at 11:15

## 2019-07-01 RX ADMIN — SODIUM CHLORIDE, POTASSIUM CHLORIDE, SODIUM LACTATE AND CALCIUM CHLORIDE 1000 ML: 600; 310; 30; 20 INJECTION, SOLUTION INTRAVENOUS at 10:03

## 2019-07-01 ASSESSMENT — LIFESTYLE VARIABLES: TOBACCO_USE: 1

## 2019-07-01 NOTE — ADDENDUM NOTE
Addendum  created 07/01/19 3819 by Calvin Gr MD    Flowsheet data copied forward, Intraprocedure Flowsheets edited

## 2019-07-01 NOTE — PLAN OF CARE
Pt is here for 1030 scheduled repeat c/s.   38.0 wks.  Pt has Ehler's Dunlos Syndrome, PAD, POTS, ADHD, anxiety - not on meds.  Pt is here with her , Yeyo.  Prep completed.  NSAIDs can cause pt GI upset.

## 2019-07-01 NOTE — ANESTHESIA CARE TRANSFER NOTE
Patient: Anjelica Holloway    Procedure(s):  Repeat  Section    Diagnosis: Previous  Section  Diagnosis Additional Information: No value filed.    Anesthesia Type:   No value filed.     Note:  Airway :Room Air  Patient transferred to:Labor and Delivery  Handoff Report: Identifed the Patient, Identified the Reponsible Provider, Reviewed the pertinent medical history, Discussed the surgical course, Reviewed Intra-OP anesthesia mangement and issues during anesthesia, Set expectations for post-procedure period and Allowed opportunity for questions and acknowledgement of understanding      Vitals: (Last set prior to Anesthesia Care Transfer)    CRNA VITALS  2019 1208 - 2019 1240      2019             Pulse:  75    SpO2:  99 %    Resp Rate (observed):  1  (Abnormal)                 Electronically Signed By: Calvin Brewster MD  2019  12:40 PM

## 2019-07-01 NOTE — PLAN OF CARE
Transferred to [facility] at 1500 along with parents, spouse, infant, and other children.  Oriented to room and plan of care for the evening.  Initial teaching done. On arrival, requested oxicodone for discomfort, as she prefers not to take NSAIDS due to gastric upset. Hot packs also provided. Call light and telephone within reach. Instructed not to get out of bed without staff assistance.

## 2019-07-01 NOTE — ADDENDUM NOTE
Addendum  created 07/01/19 3297 by Calvin Gr MD    Child order released for a procedure order, Intraprocedure Blocks edited, Intraprocedure Flowsheets edited, Sign clinical note

## 2019-07-01 NOTE — PROVIDER NOTIFICATION
07/01/19 1647   Provider Notification   Provider Name/Title Dr Mcmahon   Method of Notification Electronic Page   Request Evaluate-Remote   Notification Reason Medication Request  (patient requests vicodin rather than oxicodone)

## 2019-07-01 NOTE — PROVIDER NOTIFICATION
07/01/19 1332   Provider Notification   Provider Name/Title dr gonzalez   Method of Notification Electronic Page   asking if okay to receive toradol while in the pacu.

## 2019-07-01 NOTE — H&P
[facility]  OB History and Physical      Anjelica Smith MRN# [id number]   Age: 24 year old YOB: 1995     HPI:  Ms. Anjelica Smith is a 24 year old  at 38w0d by 14w1d US, who presents for scheduled repeat  section.  She denies contractions, vaginal bleeding, and loss of fluid.   + normal fetal movement.    Pregnancy Complications:  -  Short interval pregnancy, last delivered 18  - Bill Danlos syndrome w/ bilateral lower extremity malformations. On sildenafil BID for vascular spasms, symptoms improved during pregnancy.   - Chronic pain   - Anxiety, no meds  - Tobacco use in pregnancy, no current use  - H/o prior  section x2    Prenatal Labs:   Lab Results   Component Value Date    ABO A 2019    RH Pos 2019    AS Neg 2019    HEPBANG Nonreactive 01/15/2019    CHPCRT Negative 2019    GCPCRT Negative 2019    TREPAB Negative 2018    RUBELLAABIGG positive 2014    HGB 10.2 (L) 2019       GBS Status:   Lab Results   Component Value Date    GBS Negative 2019       Ultrasounds  19   Anatomy normal  Cardiac activity present.  bpm.  Fetal movements present.  Presentation cephalic.  Placenta anterior, no previa .  Umbilical cord 3 vessel cord.  Amniotic fluid MVP 7.9 cm.  EFW 78%ile     OB History  OB History    Para Term  AB Living   3 2 2 0 0 2   SAB TAB Ectopic Multiple Live Births   0 0 0 0 2      # Outcome Date GA Lbr Lewis/2nd Weight Sex Delivery Anes PTL Lv   3 Current            2 Term 18 38w0d  3.49 kg (7 lb 11.1 oz) F  Spinal N KEYON      Name: WILIAN SMITH      Apgar1: 9  Apgar5: 9   1 Term 05/20/15 38w0d  3.204 kg (7 lb 1 oz) F CS-LTranv  N KEYON      Apgar1: 9  Apgar5: 9       PMHx:   - Bill Danlos syndrome   - ADHD  - Peripheral artery disease   - Vascular anomalies  PSHx:   Appendectomy  Cholecystectomy    x2  Meds: Sildenafil   Allergies:  NSAIDs -  gastric ulcers    FmHx: History reviewed. No pertinent family history.  SocHx: Has used tobacco at beginning of pregnancy. She denies any alcohol or other drug use during this pregnancy.    ROS:   Complete 10-point ROS negative except as noted in HPI.She denies headache, blurry vision, chest pain, shortness of breath, RUQ pain, nausea, vomiting, dysuria, hematuria or extremity edema.    PE:  Vit:   Patient Vitals for the past 4 hrs:   BP Temp Temp src Pulse Resp   19 0906 117/55 98.3  F (36.8  C) Oral 81 16      Gen: Well-appearing, NAD, comfortable   CV: rrr, no mrg   Pulm: Ctab, no wheezes or crackles   Abd: Soft, gravid, non-tender  Ext: Trace LE edema b/l    Pres:  vtx by BSUS  EFW:  7.5# by Leopold's  Memb: Intact              FHT: Baseline 125, moderate variability, present accelerations, no decelerations   [place]: Rare    Assessment  Ms. Anjelica Holloway is a 24 year old , at 38w0d by 14w1d US, who presents for scheduled repeat  section.    Plan  Admit to L&D.    #RLTCS:  - Standard admission labs  - NPO/IVF  - Anesthesia per MDA, per consult note considering epidural vs spinal   - Ancef, SCDs for ppx   - Risks of repeat  section including bleeding, injury to surrounding structures and infection discussed with patient. Patient agreed to proceed and written consent obtained.     #FWB: Category I FHT.  Continue EFM and toco  - GBS neg  - EFW 7.5#, vtx by BSUS, placenta anterior    #PNC: Rh pos, Rubella equivocal - plan for MMR postpartum, GCT nml, anatomy nml  - contraception: declines tubal at time of , plans for Mirena    The patient was discussed with Dr. Fishman who is in agreement with the treatment plan.    Sheridan Mcmahon MD  OBGYN PGY-2  6:25 AM 2019  OB/GYN Staff -- Pt seen and examined by me. Agree with note as above.  MD Rupesh

## 2019-07-01 NOTE — ANESTHESIA PREPROCEDURE EVALUATION
Anesthesia Pre-Procedure Evaluation    Patient: Anjelica Holloway   MRN:     [id number] Gender:   female   Age:    24 year old :      1995        Preoperative Diagnosis: Previous  Section   Procedure(s):  Repeat  Section     Past Medical History:   Diagnosis Date     ADHD (attention deficit hyperactivity disorder)      Ehler's-Danlos syndrome 2013     Problem list name updated by automated process. Provider to review and confirm     Headaches      Joint hyperextensibility of multiple sites     suspect Ehler-Danlos but genetics testing not conclusive      Meckel's diverticulum     rupture of Meckel's diverticulum s/p bowel resection, approx 4 inches removed     PAD (peripheral artery disease) (H) 2012    Non-atherosclerotic PAD due to unknown arterial occlusive disease, presumed Bill-Danlos syndrome, with superimposed vasospasm      Peptic ulcer disease     secondary to prolonged NSAIDs use for headache     POTS (postural orthostatic tachycardia syndrome)      Vascular abnormality 2013     Vascular anomalies, 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, and hypoplastic right posterior tibular arteries      Past Surgical History:   Procedure Laterality Date     ------------OTHER-------------  rupture Meckel diverticulum with removal of necroic bowel    Removal of      APPENDECTOMY        SECTION N/A 2015    Procedure:  SECTION;  Surgeon: Zulma Chavez MD;  Location: [facility]      SECTION N/A 2018    Procedure:  SECTION;  Repeat  Section ;  Surgeon: Jayne Smith MD;  Location: [facility]     CHOLECYSTECTOMY            Anesthesia Evaluation     .             ROS/MED HX    ENT/Pulmonary:     (+)tobacco use (quit within last 6 months), Past use , . .    Neurologic:       Cardiovascular:         METS/Exercise Tolerance:     Hematologic:         Musculoskeletal: Comment:  Bill danlos syndrome. EDS classic vs type III with bilateral lower extremity vascular malformations 4/09 CT angio ([facility]): Atretic anterior tibial, essentially absent posterior tibial with prominent peroneal arteries that extent to the medial aspect of the ankle bilaterally.  Thus, there appears to be essentially a one-vessel runoff to both feet.  8/09 CT angio ([facility]): Atretic but patent anterior tibial arteries bilaterally, absent left posterior tibial and atretic right posterior tibial arteries with prominent peroneal arteries that extend to the medial aspect of the ankle bilaterally. 1/11 MRI ([facility]): 1. Occlusion of both posterior tibial arteries occurring in the midcalf on the right and the upper calf on the left. 2. Hypoplastic bilateral anterior tibial arteries with patent flow into the feet. 3. Prominent peroneal arteries bilaterally. Hypoplastic left anterior tibial artery and absent left posterior tibial artery; hypoplastic right posterior tibial artery with occlusive small vessel disease.   (+)  other musculoskeletal (on Sildenafil BID for symptomatic relief, due to no symptoms patient no longer on this medication )- Vascular spasms      GI/Hepatic:         Renal/Genitourinary:         Endo:         Psychiatric:     (+) psychiatric history anxiety      Infectious Disease:         Malignancy:         Other:                     [facility] AN PHYSICAL EXAM    Lab Results   Component Value Date    WBC 10.8 04/25/2019    HGB 10.2 (L) 04/25/2019    HCT 32.3 (L) 04/25/2019     04/25/2019     02/02/2013    POTASSIUM 4.0 02/02/2013    CHLORIDE 107 02/02/2013    CO2 23 02/02/2013    BUN 17 02/02/2013    CR 0.64 02/02/2013    GLC 85 02/02/2013    ERICA 8.7 02/02/2013    ALBUMIN 3.5 (L) 02/02/2013    PROTTOTAL 6.0 (L) 02/02/2013    ALT 13 02/02/2013    AST 17 02/02/2013    ALKPHOS 39 (L) 02/02/2013    BILITOTAL 0.5 02/02/2013    TSH 1.52 01/15/2019    HCG Negative 02/01/2013       Preop Vitals  BP Readings  "from Last 3 Encounters:   06/18/19 119/53   06/04/19 118/71   05/23/19 107/56    Pulse Readings from Last 3 Encounters:   06/18/19 94   06/04/19 90   05/23/19 84      Resp Readings from Last 3 Encounters:   05/23/19 18   03/21/19 20   07/26/18 16    SpO2 Readings from Last 3 Encounters:   05/23/19 98%   03/21/19 98%   02/19/19 99%      Temp Readings from Last 1 Encounters:   07/26/18 37.1  C (98.7  F) (Oral)    Ht Readings from Last 1 Encounters:   07/23/18 1.702 m (5' 7\")      Wt Readings from Last 1 Encounters:   06/18/19 77.7 kg (171 lb 6.4 oz)    Estimated body mass index is 26.85 kg/m  as calculated from the following:    Height as of 7/23/18: 1.702 m (5' 7\").    Weight as of 6/18/19: 77.7 kg (171 lb 6.4 oz).     LDA:  Peripheral IV 02/01/13 Right;Lateral Lower forearm (Active)   Number of days: 2341       Peripheral IV 02/02/13 Left Upper forearm (Active)   Number of days: 2340            Assessment:   ASA SCORE: 3       Documentation: H&P complete; Preop Testing complete; Consents complete   Proceeding: Proceed without further delay  Tobacco Use:  NO Active use of Tobacco/UNKNOWN Tobacco use status     Plan:   Anes. Type:  Regional; MAC     RA Details:  Labor/OB Procedure; SS     RA-Location/Type: Spinal   Pre-Induction: None     Drips/Meds-Preparation: Oxytocin   Induction:  Not applicable   Airway: Native Airway   Access/Monitoring: PIV; 2nd PIV   Maintenance: N/a   Emergence: Not Applicable   Logistics: Observation/Admission     Postop Pain/Sedation Strategy:  Standard-Options: Block SS; IV Ketorolac     PONV Management:  Adult Risk Factors: Female, Non-Smoker  Prevention: Ondansetron                         Calvin Brewster MD  "

## 2019-07-01 NOTE — ANESTHESIA POSTPROCEDURE EVALUATION
Anesthesia POST Procedure Evaluation    Patient: Anjelica Holloway   MRN:     [id number] Gender:   female   Age:    24 year old :      1995        Preoperative Diagnosis: Previous  Section   Procedure(s):  Repeat  Section   Postop Comments: No value filed.       Anesthesia Type:  Regional, MAC  No value filed.    Reportable Event: NO     PAIN: Uncomplicated   Sign Out status: Comfortable, Well controlled pain     PONV: No PONV   Sign Out status:  No Nausea or Vomiting     Neuro/Psych: Uneventful perioperative course   Sign Out Status: Preoperative baseline; Age appropriate mentation     Airway/Resp.: Uneventful perioperative course   Sign Out Status: Non labored breathing, age appropriate RR; Resp. Status within EXPECTED Parameters     CV: Uneventful perioperative course   Sign Out status: Appropriate BP and perfusion indices; Appropriate HR/Rhythm     Disposition:   Sign Out in:  PACU  Disposition:  Floor  Recovery Course: Uneventful  Follow-Up: Not required           Last Anesthesia Record Vitals:  CRNA VITALS  2019 1208 - 2019 1305      2019             Pulse:  75    SpO2:  99 %    Resp Rate (observed):  1  (Abnormal)           Last PACU Vitals:  No vitals data found for the desired time range.        Electronically Signed By: Manpreet Hairston DO, 2019, 1:05 PM

## 2019-07-01 NOTE — OP NOTE
[facility]  Full Operative Progress Note     Surgery Date:  2019    Surgeon:  Little Fishman MD    Assistants:  Sheridan Mcmahon MD, PGY-2    Pre-op Diagnosis:    -  Short interval pregnancy  - Bill Danlos syndrome w/ bilateral lower extremity malformations   - Chronic pain   - Anxiety  - Tobacco use in pregnancy  - H/o prior  section x2     Post-op Diagnosis:    - Same   - Liveborn male infant   - Extensive intraabdominal adhesive disease    Procedure:  Repeat low-transverse  section with two layer uterine closure via Pfannenstiel incision    Anesthesia: Combined spinal/epidural    EBL:  234 mL    IVF:  1100 mL crystalloid    UOP:  300 mL clear urine at the end of the case    Drains: Costa Catheter     Specimens:  Placenta, cord blood, cord gases    Complications: None apparent    Indications:   Anjelica Holloway is a 24 year old  at 38w0d admitted for scheduled repeat  section.  The risks, benefits, and alternatives of  section were discussed with the patient, and she agreed to proceed.     Findings:   1. Dense rectofascial adhesions. Dense adhesions of the upper 1/3 of the anterior uterus to the anterior abdominal wall. Defect in the anterior uterus made with lysis of adhesions. Lower uterine segment unable to be identified due to adhesive disease, high transverse hysterotomy made in the active segment of the uterus. Recommend future deliveries between 36-37 weeks. Advised against subsequent pregnancy due to level of scarring   2. Clear amniotic fluid  3. Liveborn infant in ROSE MARIE presentation. Apgars 4 and 7 at 1 and 5 minutes. Weight pending.  4. Arterial cord pH 7.22, base deficit 7.1. Venous cord pH 7.23, base deficit 5.4.  5. Normal right fallopian tube and ovary. Left fallopian tube and ovary not able to be visualized due to adhesive disease.     Procedure Details:   The patient was brought to the OR, where adequate combined spinal and  epidural anesthesia was administered.  She was placed in the dorsal supine position with a slight leftward tilt. She was prepped and draped in the usual sterile fashion. A surgical time out was performed. A pfannenstiel skin incision was made with the scalpel, and carried down to the underlying fascia with sharp and blunt dissection. The fascia was incised in the midline, and the incision was extended laterally with the Richard scissors. The superior aspect of the fascia was grasped with the Kocher clamps and dissected off of the underlying rectus muscles with blunt and sharp dissection. Attention was then turned to the inferior aspect of the fascia, which was similarly dissected off of the underlying rectus muscles. The rectus muscles were  in the midline, and the peritoneum was entered bluntly, and the opening was extended with digital pressure. The bladder blade was placed.  A transverse hysterotomy was made with the scalpel in the active uterine segment as the lower uterine segment could not be identified due to adhesions. The incision was extended with digital pressure. The infant was noted to be in the ROSE MARIE position. The head was noted to be floating and fundal pressure applied. The head was not able to be delivered easily and therefore a small portion of the rectus muscle was transected bilaterally. The shoulders delivered easily.  No nuchal cord was noted. The cord was doubly clamped and cut, and the infant was handed off to the awaiting nursery staff. A segment of cord was cut and held. The placenta was delivered with gentle traction on the umbilical cord and uterine massage. The uterus was cleared of all clots and debris. Uterine tone was noted to be firm with 20 units of pitocin given through the running IV and uterine massage.  The hysterotomy was closed with a running locked suture of 0 Vicryl. Attention was turned to the anterior uterus where a bleeding defect was noted in the serosa and a small  depth of myometrium. The defect was repaired with a running locked suture of 0-Vicryl. The hysterotomy was then imbricated using an 0 Monocryl suture. The hysterotomy was noted to be hemostatic. The posterior cul-de-sac was cleared of all clots and debris.  The pericolic gutters were cleared of all clots and debris. The hysterotomy was reexamined and noted to be hemostatic. The fascia and rectus muscles were examined and areas of oozing were controlled with electrocautery. Two pieces of Intercede were placed, one over the uterus and one between rectus and fascia. The fascia was closed with a running 0 Vicryl suture. The subcutaneous tissue was irrigated and areas of oozing were controlled with electrocautery. The subcutaneous tissue was less than 2 cm in thickness, and was therefore not closed. The skin was closed with 4-0 Monocryl and covered with a sterile dressing.    All sponge, needle, and instrument counts were correct. The patient tolerated the procedure well, and was transferred to recovery in stable condition. Dr. Fishman was present and scrubbed for the entirety of the procedure.     Sheridan Mcmahon  OB/GYN Resident, PGY-2  7/1/2019 6:44 AM    Little Fishman MD

## 2019-07-01 NOTE — PROVIDER NOTIFICATION
07/01/19 1415   Provider Notification   Provider Name/Title DR ALONSO   Method of Notification Electronic Page   NEED PLACETA PATH ORDER.

## 2019-07-01 NOTE — ANESTHESIA PROCEDURE NOTES
Peripheral Nerve Block Procedure Note    Staff:     Anesthesiologist:  Manpreet Hairston DO  Location: PACU  Procedure Start/Stop TImes:      7/1/2019 12:52 PM     7/1/2019 12:57 PM    patient identified, IV checked, site marked, risks and benefits discussed, informed consent, monitors and equipment checked, pre-op evaluation, at physician/surgeon's request and post-op pain management      Correct Patient: Yes      Correct Position: Yes      Correct Site: Yes      Correct Procedure: Yes      Correct Laterality:  Yes    Site Marked:  Yes  Procedure details:     Procedure:  TAP    ASA:  2    Diagnosis:  Post op pain    Laterality:  Bilateral    Position:  Supine    Sterile Prep: chloraprep, mask and sterile gloves      Local skin infiltration:  None    Needle:  Short bevel    Needle gauge:  21    Needle length (inches):  4    Ultrasound: Yes      Ultrasound used to identify targeted nerve, plexus, or vascular structure and placed a needle adjacent to it      Permanent Image entered into patiient's record      Abnormal pain on injection: No      Blood Aspirated: No      Paresthesias:  No    Bleeding at site: No      Bolus via:  Needle    Infusion Method:  Single Shot    Complications:  None  Assessment/Narrative:      Informed consent obtained.  All risks and benefits of the nerve block discussed with the patient.  All questions answered and all parties agreed with the plan.   Block was placed at the surgeon's request for post operative pain control.

## 2019-07-01 NOTE — PROGRESS NOTES
Transferred to [facility] in stable condition.  Manageable right shoulder pain.  Given tyelenol and fentanyl and warm packs while in the PACU.  Pt declined toradol as NSAIDs cause GI upset.  Had tap block placed on arrival to the PACU.  Breast fed  well with minimal assistance with positioning.  Yeyo and children/grandparents in PACU and supportive.  Total QBL from surgery and pacu= 358.  Placenta to pathology.  Cord gases sent to nicu lab.  Cord blood to lab.  Bedside report and  ID bands checked and matched with Jossy TAYLOR.

## 2019-07-01 NOTE — PROVIDER NOTIFICATION
07/01/19 0859   Provider Notification   Provider Name/Title Dr Mcmahon   Method of Notification In Department   Request Evaluate in Person   Notification Reason Patient Arrived   1030 scheduled repeat c/s arrived.

## 2019-07-01 NOTE — ANESTHESIA PROCEDURE NOTES
Spinal/LP Procedure Note    Spinal Block  Staff:     Anesthesiologist:  Manpreet Hairston DO    Resident/CRNA:  Calvin Gr MD    Spinal/LP performed by resident/CRNA in presence of a teaching physician.    Location: OB and OR  Procedure Start/Stop Times:     patient identified, IV checked, site marked, risks and benefits discussed, informed consent, monitors and equipment checked, pre-op evaluation, at physician/surgeon's request and post-op pain management      Correct Patient: Yes      Correct Position: Yes      Correct Site: Yes      Correct Procedure: Yes      Correct Laterality:  Yes    Site Marked:  Yes  Procedure:     Procedure:  Intrathecal    ASA:  3    Position:  Sitting    Insertion site:  L3-4    Approach:  Midline    Needle gauge (G):  20    Local Skin Infiltration:  1% lidocaine    amount (ml):  3    Needle Length (in):  3.5    Introducer used: Yes      Introducer gauge:  20 G    Attempts:  1    Redirects:  1    CSF:  Clear    Paresthesias:  No  Assessment/Narrative:     Sensory Level:  T4

## 2019-07-02 LAB — HGB BLD-MCNC: 8.4 G/DL (ref 11.7–15.7)

## 2019-07-02 PROCEDURE — 12000001 ZZH R&B MED SURG/OB UMMC

## 2019-07-02 PROCEDURE — 36415 COLL VENOUS BLD VENIPUNCTURE: CPT | Performed by: STUDENT IN AN ORGANIZED HEALTH CARE EDUCATION/TRAINING PROGRAM

## 2019-07-02 PROCEDURE — 85018 HEMOGLOBIN: CPT | Performed by: STUDENT IN AN ORGANIZED HEALTH CARE EDUCATION/TRAINING PROGRAM

## 2019-07-02 PROCEDURE — 25000132 ZZH RX MED GY IP 250 OP 250 PS 637: Performed by: STUDENT IN AN ORGANIZED HEALTH CARE EDUCATION/TRAINING PROGRAM

## 2019-07-02 PROCEDURE — 25000128 H RX IP 250 OP 636: Performed by: STUDENT IN AN ORGANIZED HEALTH CARE EDUCATION/TRAINING PROGRAM

## 2019-07-02 RX ORDER — IBUPROFEN 800 MG/1
800 TABLET, FILM COATED ORAL EVERY 6 HOURS PRN
Qty: 30 TABLET | Refills: 1 | Status: SHIPPED | OUTPATIENT
Start: 2019-07-02 | End: 2019-07-04

## 2019-07-02 RX ORDER — AMOXICILLIN 250 MG
1 CAPSULE ORAL 2 TIMES DAILY PRN
Qty: 30 TABLET | Refills: 1 | Status: SHIPPED | OUTPATIENT
Start: 2019-07-02 | End: 2020-11-05

## 2019-07-02 RX ORDER — ACETAMINOPHEN 325 MG/1
650 TABLET ORAL EVERY 6 HOURS PRN
Qty: 30 TABLET | Refills: 0 | Status: SHIPPED | OUTPATIENT
Start: 2019-07-02 | End: 2019-07-04

## 2019-07-02 RX ORDER — KETOROLAC TROMETHAMINE 30 MG/ML
30 INJECTION, SOLUTION INTRAMUSCULAR; INTRAVENOUS EVERY 6 HOURS
Status: COMPLETED | OUTPATIENT
Start: 2019-07-02 | End: 2019-07-03

## 2019-07-02 RX ORDER — OXYCODONE HYDROCHLORIDE 5 MG/1
5 TABLET ORAL ONCE
Status: COMPLETED | OUTPATIENT
Start: 2019-07-02 | End: 2019-07-02

## 2019-07-02 RX ADMIN — KETOROLAC TROMETHAMINE 30 MG: 30 INJECTION, SOLUTION INTRAMUSCULAR at 00:37

## 2019-07-02 RX ADMIN — SENNOSIDES AND DOCUSATE SODIUM 1 TABLET: 8.6; 5 TABLET ORAL at 19:42

## 2019-07-02 RX ADMIN — HYDROCODONE BITARTRATE AND ACETAMINOPHEN 2 TABLET: 5; 325 TABLET ORAL at 22:38

## 2019-07-02 RX ADMIN — HYDROCODONE BITARTRATE AND ACETAMINOPHEN 2 TABLET: 5; 325 TABLET ORAL at 18:40

## 2019-07-02 RX ADMIN — OXYCODONE HYDROCHLORIDE 5 MG: 5 TABLET ORAL at 12:31

## 2019-07-02 RX ADMIN — HYDROCODONE BITARTRATE AND ACETAMINOPHEN 2 TABLET: 5; 325 TABLET ORAL at 03:22

## 2019-07-02 RX ADMIN — SENNOSIDES AND DOCUSATE SODIUM 2 TABLET: 8.6; 5 TABLET ORAL at 09:00

## 2019-07-02 RX ADMIN — HYDROCODONE BITARTRATE AND ACETAMINOPHEN 2 TABLET: 5; 325 TABLET ORAL at 07:15

## 2019-07-02 RX ADMIN — KETOROLAC TROMETHAMINE 30 MG: 30 INJECTION, SOLUTION INTRAMUSCULAR at 14:34

## 2019-07-02 RX ADMIN — SIMETHICONE CHEW TAB 80 MG 80 MG: 80 TABLET ORAL at 00:39

## 2019-07-02 RX ADMIN — HYDROCODONE BITARTRATE AND ACETAMINOPHEN 2 TABLET: 5; 325 TABLET ORAL at 14:29

## 2019-07-02 RX ADMIN — KETOROLAC TROMETHAMINE 30 MG: 30 INJECTION, SOLUTION INTRAMUSCULAR at 20:44

## 2019-07-02 RX ADMIN — KETOROLAC TROMETHAMINE 30 MG: 30 INJECTION, SOLUTION INTRAMUSCULAR at 07:15

## 2019-07-02 NOTE — PROGRESS NOTES
Post Partum Progress Note  PPD#1    Subjective:  She is resting comfortably in bed this morning. Pain is improving and well controlled on current medication regimen. She is tolerating PO intake. Lochia present and similar to a period.  She is voiding without difficulty. She has passed flatus and has not had a BM. She is ambulating without dizziness or difficulty.  She denies headache, changes in vision, nausea/vomiting, chest pain, shortness of breath, RUQ pain, or worsening edema.  Plans to breast feed.    Objective:  Vitals:    19 1742 19 1919 19 2152 19 0028   BP: 100/63 101/62 109/59 105/58   Cuff Size:       Pulse: 79 83 79    Resp: 18 16 18    Temp: 98.3  F (36.8  C)  98.7  F (37.1  C) 98.7  F (37.1  C)   TempSrc: Oral  Oral Oral   SpO2: 100% 97% 96% 97%       General: NAD, resting comfortably  CV: Regular rate, well perfused.   Pulm: Normal respiratory effort.  Abd: Soft, non-tender, non-distended. Fundus is firm and 1 cm below the umbilicus.    Incision: bandage in place incision is clean, dry, intact  Ext: Trace lower extremity edema bilaterally. No calf tenderness.    Assessment/Plan:  Anjelica Holloway is a 24 year old  female who is POD#1 s/p RHTCS. Pregnancy notable for ehler's Danlos syndrome, chronic pain, anxiety, tobacco use.     - Encourage routine post-operative goals including ambulation and incentive spirometry  - PNC: Rh positive. Rubella immune. No intervention indicated.  - Pain: controlled on oral medications. History of chronic pain.   - Heme: Hgb 10.2>> 9.9  Will discharge home with iron.  - GI: continue anti-emetics and stool softeners as needed.  - : Voiding spontaneously.  - Infant: Stable in room  - Feeding: Plans on breastfeeding.  - BC: will discuss tomorrow    Ehler's Danlos syndrome:   - Sildenafil    Anxiety: no meds. Mood stable    Discussed that with the high transverse  section that with any future pregnancies she should have  scheduled repeat CS at 36-37 weeks.       Discharge to home on POD#2-3      Alena Avelar MD  Obstetrics and Gyncology, PGY-2  July 2, 2019 , 1:29 AM     Appreciate Dr. Avelar's note above, patient also seen and examined by me. I agree with the note above.   Little Hawk MD

## 2019-07-02 NOTE — PROVIDER NOTIFICATION
07/02/19 1204   Provider Notification   Provider Name/Title G2 Salome   Method of Notification Electronic Page   Request Evaluate-Remote   Notification Reason Other   Pt would like to take 2 norco now. If I give it, the total acetaminophen dose will be 4225g in 24 hours (starting at 1400 yest). Pt cannot wait until 1400. Can I give it?

## 2019-07-02 NOTE — DISCHARGE SUMMARY
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 On sildenafil BID for vascular spasms, symptoms improved during pregnancy.   - Chronic pain   - Anxiety, no meds  - Tobacco use in pregnancy, no current use  - H/o prior  section x2          Discharge Diagnosis:     Same, delivered             Procedures:     Procedure(s): Repeat high transverse  section with two layer closure via Pfannenstiel skin incision  Combined spinal/epidural  TAPS block                Medications Prior to Admission:     Medications Prior to Admission   Medication Sig Dispense Refill Last Dose     ferrous sulfate (IRON) 325 (65 Fe) MG tablet Take 1 tablet (325 mg) by mouth daily (with breakfast) 90 tablet 0 Past Week at Unknown time     Prenatal Vit-Fe Fumarate-FA (PRENATAL MULTIVITAMIN PLUS IRON) 27-0.8 MG TABS per tablet Take 1 tablet by mouth daily   Past Week at Unknown time     sildenafil (REVATIO/VIAGRA) 20 MG tablet Take 1 tablet by mouth 3 times daily. 90 tablet 3 Past Week at Unknown time     calcium-vitamin D-vitamin K (VIACTIV) 500-500-40 MG-UNT-MCG CHEW Take 1 tablet by mouth daily.   More than a month at Unknown time     Capsaicin-Menthol 0.025-5 % PTCH Externally apply 1 patch topically daily as needed (Patient not taking: Reported on 2019) 2 patch 0 Not Taking     Cholecalciferol (VITAMIN D3 PO) Take 1,000 Units by mouth daily.   More than a month at Unknown time     cyclobenzaprine (FLEXERIL) 10 MG tablet Take 1 tablet (10 mg) by mouth 3 times daily as needed for muscle spasms (Patient not taking:  Reported on 5/23/2019) 42 tablet 3 Not Taking     cyclobenzaprine (FLEXERIL) 5 MG tablet Take 1 tablet (5 mg) by mouth nightly as needed for muscle spasms (Patient not taking: Reported on 5/23/2019) 20 tablet 0 Not Taking     ibuprofen (ADVIL/MOTRIN) 600 MG tablet Take 1 tablet (600 mg) by mouth every 6 hours as needed for moderate pain (Patient not taking: Reported on 5/23/2019) 50 tablet 0 Not Taking     Lidocaine HCl 3 % GEL Externally apply topically daily as needed (Patient not taking: Reported on 5/23/2019) 30 mL 0 Not Taking     methylphenidate (RITALIN LA) 10 MG CP24 Take 30 mg by mouth 2 tabs of 10mg in A.M and 1  10mg in P.M   Not Taking     oxyCODONE-acetaminophen (PERCOCET) 5-325 MG per tablet Take 1-2 tablets by mouth every 4 hours as needed for moderate to severe pain (Patient not taking: Reported on 5/23/2019) 20 tablet 0 Not Taking     PARoxetine (PAXIL) 10 MG tablet Take 1 tablet (10 mg) by mouth every morning 60 tablet 1 Unknown at Unknown time     senna-docusate (SENOKOT-S;PERICOLACE) 8.6-50 MG per tablet Take 2 tablets by mouth 2 times daily as needed for constipation 60 tablet 0 Unknown at Unknown time             Discharge Medications:        Review of your medicines      START taking      Dose / Directions   acetaminophen 325 MG tablet  Commonly known as:  TYLENOL      Dose:  650 mg  Take 2 tablets (650 mg) by mouth every 6 hours as needed for other (multimodal surgical pain management along with NSAIDS and opioid medication as indicated based on pain control and physical function.)  Quantity:  30 tablet  Refills:  0        CONTINUE these medicines which may have CHANGED, or have new prescriptions. If we are uncertain of the size of tablets/capsules you have at home, strength may be listed as something that might have changed.      Dose / Directions   * ibuprofen 600 MG tablet  Commonly known as:  ADVIL/MOTRIN  This may have changed:  Another medication with the same name was added. Make sure  you understand how and when to take each.  Used for:  S/P  section      Dose:  600 mg  Take 1 tablet (600 mg) by mouth every 6 hours as needed for moderate pain  Quantity:  50 tablet  Refills:  0     * ibuprofen 800 MG tablet  Commonly known as:  ADVIL/MOTRIN  This may have changed:  You were already taking a medication with the same name, and this prescription was added. Make sure you understand how and when to take each.      Dose:  800 mg  Take 1 tablet (800 mg) by mouth every 6 hours as needed for other (cramping)  Quantity:  30 tablet  Refills:  1     * senna-docusate 8.6-50 MG tablet  Commonly known as:  SENOKOT-S/PERICOLACE  This may have changed:  Another medication with the same name was added. Make sure you understand how and when to take each.  Used for:  S/P  section      Dose:  2 tablet  Take 2 tablets by mouth 2 times daily as needed for constipation  Quantity:  60 tablet  Refills:  0     * senna-docusate 8.6-50 MG tablet  Commonly known as:  SENOKOT-S/PERICOLACE  This may have changed:  You were already taking a medication with the same name, and this prescription was added. Make sure you understand how and when to take each.      Dose:  1 tablet  Take 1 tablet by mouth 2 times daily as needed for constipation  Quantity:  30 tablet  Refills:  1         * This list has 4 medication(s) that are the same as other medications prescribed for you. Read the directions carefully, and ask your doctor or other care provider to review them with you.            CONTINUE these medicines which have NOT CHANGED      Dose / Directions   calcium-vitamin D-vitamin K 500-500-40 MG-UNT-MCG Chew  Commonly known as:  VIACTIV      Dose:  1 tablet  Take 1 tablet by mouth daily.  Refills:  0     Capsaicin-Menthol 0.025-5 % Ptch  Used for:  S/P  section      Dose:  1 patch  Externally apply 1 patch topically daily as needed  Quantity:  2 patch  Refills:  0     * cyclobenzaprine 10 MG tablet  Commonly  known as:  FLEXERIL  Used for:  Back pain, unspecified back location, unspecified back pain laterality, unspecified chronicity      Dose:  10 mg  Take 1 tablet (10 mg) by mouth 3 times daily as needed for muscle spasms  Quantity:  42 tablet  Refills:  3     * cyclobenzaprine 5 MG tablet  Commonly known as:  FLEXERIL  Used for:  Ehler's-Danlos syndrome      Dose:  5 mg  Take 1 tablet (5 mg) by mouth nightly as needed for muscle spasms  Quantity:  20 tablet  Refills:  0     ferrous sulfate 325 (65 Fe) MG tablet  Commonly known as:  FEROSUL  Used for:  Other iron deficiency anemia      Dose:  325 mg  Take 1 tablet (325 mg) by mouth daily (with breakfast)  Quantity:  90 tablet  Refills:  0     Lidocaine HCl 3 % Gel  Used for:  S/P  section      Externally apply topically daily as needed  Quantity:  30 mL  Refills:  0     oxyCODONE-acetaminophen 5-325 MG tablet  Commonly known as:  PERCOCET  Used for:  S/P  section      Dose:  1-2 tablet  Take 1-2 tablets by mouth every 4 hours as needed for moderate to severe pain  Quantity:  20 tablet  Refills:  0     PARoxetine 10 MG tablet  Commonly known as:  PAXIL  Used for:  Generalized anxiety disorder      Dose:  10 mg  Take 1 tablet (10 mg) by mouth every morning  Quantity:  60 tablet  Refills:  1     prenatal multivitamin w/iron 27-0.8 MG tablet      Dose:  1 tablet  Take 1 tablet by mouth daily  Refills:  0     RITALIN LA 10 MG 24 hr capsule  Generic drug:  methylphenidate      Dose:  30 mg  Take 30 mg by mouth 2 tabs of 10mg in A.M and 1  10mg in P.M  Refills:  0     sildenafil 20 MG tablet  Commonly known as:  REVATIO  Used for:  Vascular abnormality, PAD (peripheral artery disease) (H)      Dose:  20 mg  Take 1 tablet by mouth 3 times daily.  Quantity:  90 tablet  Refills:  3     VITAMIN D3 PO      Dose:  1000 Units  Take 1,000 Units by mouth daily.  Refills:  0         * This list has 2 medication(s) that are the same as other medications prescribed for  you. Read the directions carefully, and ask your doctor or other care provider to review them with you.               Where to get your medicines      These medications were sent to [place], [place] - [address]  [address] [facility] [address], [facility] [address]    Phone:  [phone]     acetaminophen 325 MG tablet    ibuprofen 800 MG tablet    senna-docusate 8.6-50 MG tablet               Consultations:   None          Brief Admission History:   Ms. Anjelica Holloway is a 24 year old now  who initially presented at 38w0d for scheduled repeat . The risks, benefits, and alternatives of  section were discussed with the patient, and she agreed to proceed.           Intraoperative course   The procedure was complicated by dense adhesions.   mL.  See operative report for details.     1. Dense rectofascial adhesions. Dense adhesions of the upper 1/3 of the anterior uterus to the anterior abdominal wall. Defect in the anterior uterus made with lysis of adhesions. Lower uterine segment unable to be identified due to adhesive disease, high transverse hysterotomy made in the active segment of the uterus. Recommend future deliveries between 36-37 weeks. Advised against subsequent pregnancy due to level of scarring   2. Clear amniotic fluid  3. Liveborn infant in ROSE MARIE presentation. Apgars 4 and 7 at 1 and 5 minutes. Weight pending.  4. Arterial cord pH 7.22, base deficit 7.1. Venous cord pH 7.23, base deficit 5.4.  5. Normal right fallopian tube and ovary. Left fallopian tube and ovary not able to be visualized due to adhesive disease.        Postpartum Course   The patient's hospital course was unremarkable.  She recovered as anticipated and experienced no post-operative complications. On discharge, her pain was well controlled. Vaginal bleeding is similar to peak menstrual flow.  Voiding without difficulty.  Ambulating well and tolerating a normal diet.  No fever or  significant wound drainage.  Breastfeeding well.  Infant is stable.   She was discharged on post-partum day #3.    Post-partum hemoglobin:   Hemoglobin   Date Value Ref Range Status   07/01/2019 9.9 (L) 11.7 - 15.7 g/dL Final             Discharge Instructions and Follow-Up:     Discharge diet: Regular   Discharge activity: No lifting greater than 20 lbs, pushing, pulling, or other strenuous activity for 6 weeks. Pelvic rest for 6 weeks including no sexual intercourse, tampons, or douching. No driving until you can slam on the breaks without pain or while on narcotic pain medications.    Discharge follow-up: Follow up with primary OB for routine postpartum visit in 6 weeks   Wound care: Keep incision clean and dry           Discharge Disposition:     Discharged to home      Alena Avelar MD  Obstetrics & Gynecology, PGY-2  7/4/2019 7:51 AM    OB/GYN Staff -- Pt seen and examined by me. Agree with note as above.  MD Rupesh

## 2019-07-02 NOTE — PLAN OF CARE
Post surgical VSS- see flow sheet. Fundus firm, lochia minimal. Breastfeeding with minimal assistance. Demonstrated hand expression to patient, and she was able to do return demonstration. Discussed ways to maximize milk production, as she felt she had supply issues last baby. IV saline locked, as oral intake is adequate, and urine is clear and pale yellow. Plan: up to ambulate before bed, and remove presley catheter if ambulation goes well. States good pain control with hydrocodone and toradol.

## 2019-07-02 NOTE — PROGRESS NOTES
Post  Anesthesia Follow Up Note    Patient: Anjelica Holloway    Patient location: Postpartum floor.    Chief complaint: Acute postoperative pain managment    Procedure(s) Performed:   SECTION    Anesthesia type: Spinal Block and transversus abdominus plane (TAP) nerve block        Subjective:   The patient reports good pain control.  Pain Intensity: 3/10.  Patient reports mild pruritus, denies weakness, paresthesia.  Denies numbness, tingling lips, tinnitus, metallic taste, difficulties breathing nausea, vomiting, or difficulty voiding. She is able to ambulate and tolerates regular diet.        Objective:  Respiratory Function (RR / SpO2 / Airway Patency): Satisfactory    Cardiac Function (HR / Rhythm / BP): Satisfactory    Strength and sensation lower extremities: Strength 5/5 and grossly symmetric bilateral LE    Site of spinal/epidural insertion: clean and intact, mild tenderness, swelling or erythema    Last Vitals: /55   Pulse 79   Temp 36.7  C (98  F) (Oral)   Resp 18   LMP 10/15/2018   SpO2 97%   Breastfeeding? Unknown       Assessment and Plan:   Anjelica Holloway is a 24 year old female POD #1 s/p   SECTION with spinal 10mg morphine, 0.15 mg fentanyl IT and single shot TAP nerve block injections bupivacaine 0.75% with epinephrine 1:200,000 2 mL, then liposome bupivacaine (Exparel) long-acting 1.3% 20mL given on 19 for postoperative analgesia.  Pt is ambulating with no difficulty, no weakness or paresthesias.  No evidence of adverse side effects associated with spinal and nerve block injections. Pt is receiving adequate incisional pain control.  Anticipate up to 72 hours of incisional pain control.  Anticipate patient will require opioid/nonopioid analgesics for visceral and muscle pain not controlled with local anesthetic.      - NO other local anesthetic use within 96 hours of liposome bupivacaine (Exparel) long acting  - patient received verbal and written instructions  about liposome bupivacaine   - please call if questions or concerns  - discussed plan with attending anesthesiologist      Calvin Gr M.D.  [facility]  7/2/2019 6:46 AM    If questions or concerns, please contact OB Anesthesiologist  Phone [phone]

## 2019-07-02 NOTE — PLAN OF CARE
Vital signs stable and postpartum checks within normal limits. Pt is up with assist this morning to the bathroom to void. Pt is voiding with no problem, but need to empty her bladder every 2-3 hours. Breastfeeding is going well with minimal assist latching /positioning.complains of sharp pain at incision. Pt showered. Pt medicated with Toradol, Roxicodone once and Hydrocodone for pain control. Encouraged to empty her bladder every 2-3 hours and to pump at least 4-6 times in 24 hours due to low breast milk supply per pt.  Assisted with breastfeeding. Continue cares, check latch and assist with breastfeeding as needed.

## 2019-07-02 NOTE — PLAN OF CARE
VSS. Pain managed with Vicodin and ibuprofen. Voided post presley removal. Increased pain when getting up out of bed, continue to offer pain medications.  at bedside.

## 2019-07-03 PROCEDURE — 25000132 ZZH RX MED GY IP 250 OP 250 PS 637: Performed by: STUDENT IN AN ORGANIZED HEALTH CARE EDUCATION/TRAINING PROGRAM

## 2019-07-03 PROCEDURE — 12000001 ZZH R&B MED SURG/OB UMMC

## 2019-07-03 PROCEDURE — 25000132 ZZH RX MED GY IP 250 OP 250 PS 637: Performed by: OBSTETRICS & GYNECOLOGY

## 2019-07-03 PROCEDURE — 25000128 H RX IP 250 OP 636: Performed by: STUDENT IN AN ORGANIZED HEALTH CARE EDUCATION/TRAINING PROGRAM

## 2019-07-03 RX ORDER — CYCLOBENZAPRINE HCL 10 MG
10 TABLET ORAL 3 TIMES DAILY PRN
Status: DISCONTINUED | OUTPATIENT
Start: 2019-07-03 | End: 2019-07-04 | Stop reason: HOSPADM

## 2019-07-03 RX ADMIN — CYCLOBENZAPRINE HYDROCHLORIDE 10 MG: 10 TABLET, FILM COATED ORAL at 14:03

## 2019-07-03 RX ADMIN — CYCLOBENZAPRINE HYDROCHLORIDE 10 MG: 10 TABLET, FILM COATED ORAL at 22:13

## 2019-07-03 RX ADMIN — KETOROLAC TROMETHAMINE 30 MG: 30 INJECTION, SOLUTION INTRAMUSCULAR at 02:56

## 2019-07-03 RX ADMIN — HYDROCODONE BITARTRATE AND ACETAMINOPHEN 2 TABLET: 5; 325 TABLET ORAL at 12:41

## 2019-07-03 RX ADMIN — HYDROCODONE BITARTRATE AND ACETAMINOPHEN 2 TABLET: 5; 325 TABLET ORAL at 20:59

## 2019-07-03 RX ADMIN — HYDROCODONE BITARTRATE AND ACETAMINOPHEN 2 TABLET: 5; 325 TABLET ORAL at 16:49

## 2019-07-03 RX ADMIN — SENNOSIDES AND DOCUSATE SODIUM 2 TABLET: 8.6; 5 TABLET ORAL at 20:59

## 2019-07-03 RX ADMIN — SIMETHICONE CHEW TAB 80 MG 80 MG: 80 TABLET ORAL at 20:59

## 2019-07-03 RX ADMIN — SENNOSIDES AND DOCUSATE SODIUM 2 TABLET: 8.6; 5 TABLET ORAL at 07:51

## 2019-07-03 RX ADMIN — KETOROLAC TROMETHAMINE 30 MG: 30 INJECTION, SOLUTION INTRAMUSCULAR at 09:12

## 2019-07-03 RX ADMIN — HYDROCODONE BITARTRATE AND ACETAMINOPHEN 2 TABLET: 5; 325 TABLET ORAL at 02:56

## 2019-07-03 RX ADMIN — HYDROCODONE BITARTRATE AND ACETAMINOPHEN 2 TABLET: 5; 325 TABLET ORAL at 07:51

## 2019-07-03 NOTE — PLAN OF CARE
Pt is stable. VSS. Postpartum checks within normal limits. Pt is up in the room to the bathroom with no problem. IV saline lock is discontinued. Breastfeeding is going well and pt have started pumping with encouragement.  Pt wants to supplement baby with formula, but has good amount of colostrum. Nipples are in tact. Pain is managed with Toradol and Hydrocodone, but pt will be on Flexeril now instead of Toradol. Checked latch and flange baby's lip. Continue cares.

## 2019-07-03 NOTE — PROGRESS NOTES
Post Partum Progress Note  PPD#2    Subjective:  She is resting comfortably in bed this morning. Pain is improving and well controlled on current medication regimen. She is tolerating PO intake. Lochia decreasing.  She is voiding without difficulty. She has passed flatus and has not had a BM. She is ambulating without dizziness or difficulty.  Breastfeeding.    Objective:  Vitals:    19 0441 19 0859 19 1614 19 2350   BP: 101/55 110/61 97/58 108/63   Pulse:  79 86    Resp:  16 18    Temp: 98  F (36.7  C) 98.5  F (36.9  C) 98  F (36.7  C) 98.1  F (36.7  C)   TempSrc: Oral Oral Oral Oral   SpO2:  99%         General: NAD, resting comfortably  CV: Regular rate, well perfused.   Pulm: Normal respiratory effort.  Abd: Soft, non-tender, non-distended. Fundus is firm and 1 cm below the umbilicus.    Incision: bandage in place incision is clean, dry, intact  Ext: Trace lower extremity edema bilaterally. No calf tenderness.    Assessment/Plan:  Anjelica Holloway is a 24 year old  female who is POD#2 s/p RHTCS. Pregnancy notable for ehler's Danlos syndrome, chronic pain, anxiety, tobacco use.     - Encourage routine post-operative goals including ambulation and incentive spirometry  - PNC: Rh positive. Rubella immune. No intervention indicated.  - Pain: controlled on oral medications. History of chronic pain.   - Heme: Hgb 10.2>> 9.9  Will discharge home with iron.  - GI: continue anti-emetics and stool softeners as needed.  - : Voiding spontaneously.  - Infant: Stable in room  - Feeding: Plans on breastfeeding.  - BC: mirena IUD    Ehler's Danlos syndrome:   - Sildenafil    Anxiety: no meds. Mood stable    S/p discussion that with the high transverse  section that with any future pregnancies she should have scheduled repeat CS at 36-37 weeks.       Discharge to home tomorrow.    Charity Vasquez MD  Ob/Gyn PGY-3    [facility] staff:  Appreciate note by Dr. Vasquez.  I  have seen and examined the patient without the resident. I have reviewed, edited, and agree with the note.        Zulma Chavez MD, FACOG  7/3/2019  2:34 PM

## 2019-07-03 NOTE — PLAN OF CARE
Patient states hydrocodone and toradol are adequately managing pain. Showered and removed dressing over incision which looks good and intact with no drainage. States she is passing gas but no bowel movement yet. Encouraged ambulating and drinking hot fluids. Independent with breastfeeding. Latch checked and assisted with helping baby get wider latch to help with slight tenderness. Attentive and bonding with baby.

## 2019-07-03 NOTE — PLAN OF CARE
VSS. Pain managed with toradol and Vicodin. States discomfort when getting in and out of bed. Attentive to  and feeding cues. Does not feel ready for discharge. Up with SBA to Bathroom.

## 2019-07-04 VITALS
SYSTOLIC BLOOD PRESSURE: 100 MMHG | OXYGEN SATURATION: 99 % | HEART RATE: 83 BPM | TEMPERATURE: 98.1 F | RESPIRATION RATE: 18 BRPM | DIASTOLIC BLOOD PRESSURE: 69 MMHG

## 2019-07-04 PROCEDURE — 25000128 H RX IP 250 OP 636: Performed by: STUDENT IN AN ORGANIZED HEALTH CARE EDUCATION/TRAINING PROGRAM

## 2019-07-04 PROCEDURE — 25000132 ZZH RX MED GY IP 250 OP 250 PS 637: Performed by: STUDENT IN AN ORGANIZED HEALTH CARE EDUCATION/TRAINING PROGRAM

## 2019-07-04 PROCEDURE — 25000132 ZZH RX MED GY IP 250 OP 250 PS 637: Performed by: OBSTETRICS & GYNECOLOGY

## 2019-07-04 PROCEDURE — 90707 MMR VACCINE SC: CPT | Performed by: STUDENT IN AN ORGANIZED HEALTH CARE EDUCATION/TRAINING PROGRAM

## 2019-07-04 RX ORDER — CELECOXIB 50 MG/1
50 CAPSULE ORAL 2 TIMES DAILY
Qty: 30 CAPSULE | Refills: 0 | Status: SHIPPED | OUTPATIENT
Start: 2019-07-04 | End: 2019-07-04

## 2019-07-04 RX ORDER — IBUPROFEN 600 MG/1
600 TABLET, FILM COATED ORAL EVERY 6 HOURS PRN
Qty: 15 TABLET | Refills: 0 | Status: SHIPPED | OUTPATIENT
Start: 2019-07-04 | End: 2020-11-05

## 2019-07-04 RX ORDER — HYDROCODONE BITARTRATE AND ACETAMINOPHEN 5; 325 MG/1; MG/1
1 TABLET ORAL EVERY 6 HOURS PRN
Qty: 20 TABLET | Refills: 0 | Status: SHIPPED | OUTPATIENT
Start: 2019-07-04 | End: 2019-07-07

## 2019-07-04 RX ADMIN — SENNOSIDES AND DOCUSATE SODIUM 2 TABLET: 8.6; 5 TABLET ORAL at 07:37

## 2019-07-04 RX ADMIN — HYDROCODONE BITARTRATE AND ACETAMINOPHEN 2 TABLET: 5; 325 TABLET ORAL at 09:34

## 2019-07-04 RX ADMIN — HYDROCODONE BITARTRATE AND ACETAMINOPHEN 2 TABLET: 5; 325 TABLET ORAL at 13:14

## 2019-07-04 RX ADMIN — HYDROCODONE BITARTRATE AND ACETAMINOPHEN 2 TABLET: 5; 325 TABLET ORAL at 05:19

## 2019-07-04 RX ADMIN — HYDROCODONE BITARTRATE AND ACETAMINOPHEN 2 TABLET: 5; 325 TABLET ORAL at 01:02

## 2019-07-04 RX ADMIN — CYCLOBENZAPRINE HYDROCHLORIDE 10 MG: 10 TABLET, FILM COATED ORAL at 04:15

## 2019-07-04 RX ADMIN — MEASLES, MUMPS, AND RUBELLA VIRUS VACCINE LIVE 0.5 ML: 1000; 12500; 1000 INJECTION, POWDER, LYOPHILIZED, FOR SUSPENSION SUBCUTANEOUS at 07:46

## 2019-07-04 NOTE — PROVIDER NOTIFICATION
07/03/19 2051   Provider Notification   Provider Name/Title Dr Mcmahon   Method of Notification Electronic Page   Request Evaluate-Remote   Notification Reason Medication Request   Pt is requesting additional medication or dose change for pain control, says that the Norco and flexeril are not keeping her as comfortable as she was with the toradol.  Thank you.

## 2019-07-04 NOTE — PROGRESS NOTES
Post Partum Progress Note  PPD#3    Subjective:  She is resting comfortably in bed this morning. Pain is improving and well controlled on current medication regimen. She is tolerating PO intake. Lochia decreasing.  She is voiding without difficulty. She has passed flatus and has not had a BM. She is ambulating without dizziness or difficulty.  Breastfeeding.     Objective:  Vitals:    19 2350 19 0749 19 1734 19 0011   BP: 108/63 103/69 103/75 106/58   Pulse: 85 82  97   Resp: 16 16 16 16   Temp: 98.1  F (36.7  C) 98.4  F (36.9  C) 98.3  F (36.8  C) 98.3  F (36.8  C)   TempSrc: Oral Oral Oral Oral   SpO2:           General: NAD, resting comfortably  CV: Regular rate, well perfused.   Pulm: Normal respiratory effort.  Abd: Soft, non-tender, non-distended. Fundus is firm and 1 cm below the umbilicus.    Incision: bandage in place incision is clean, dry, intact  Ext: Trace lower extremity edema bilaterally. No calf tenderness.    Assessment/Plan:  Anjelica Holloway is a 24 year old  female who is POD#3 s/p RHTCS. Pregnancy notable for ehler's Danlos syndrome, chronic pain, anxiety, tobacco use.     - Encourage routine post-operative goals including ambulation and incentive spirometry  - PNC: Rh positive. Rubella immune. No intervention indicated.  - Pain: controlled on oral medications. History of chronic pain.   - Heme: Hgb 10.2>> 9.9  Will discharge home with iron.  - GI: continue anti-emetics and stool softeners as needed.  - : Voiding spontaneously.  - Infant: Stable in room  - Feeding: Plans on breastfeeding.  - BC: mirena IUD    Ehler's Danlos syndrome:   - Sildenafil    Anxiety: no meds. Mood stable    S/p discussion that with the high transverse  section that with any future pregnancies she should have scheduled repeat CS at 36-37 weeks.       Discharge to home today    Alena Avelar MD  Obstetrics & Gynecology, PGY-2  2019 7:50 AM  OB/GYN Staff -- Pt seen and  examined by me. Agree with note as above.  MD Rupesh

## 2019-07-04 NOTE — PLAN OF CARE
Patient's postpartum assessment WDL, vital signs stable. Fundus firm and midline, lochia WDL. Incision has steri strips and is WDL, no drainage. Had BM yesterday and is voiding without difficulty. Taking vicodin and flexeril for pain management, discussed pain regimen for home with OB this morning. MMR administered. Bonding well with infant. Breastfeeds well on cue, latch-on verified. Patient states her breasts feel full and then empty after feedings. Also supplementing infant with 10-15cc of formula via bottle per choice. Discharging to home with infant.

## 2019-07-04 NOTE — PLAN OF CARE
VSS and postpartum assessments WDL.  Up ad angela with steady gait.  Independent with cares.  Bonding well with infant.  Breastfeeding on cue independently with good latch observed, also supplementing infant with EBM and formula for weight loss of 8.9% and history of low milk supply.  Pain managed with Norco, flexeril and simethicone per MAR.  , Yeyo present and supportive.  Will continue with postpartum cares and education per plan of care.

## 2019-07-04 NOTE — PLAN OF CARE
Patients vitals have been stable. Postpartum assessment WDL. Declines headache, dizziness, and blurred vision. States that she is voiding without difficulties. Is taking norco and flexeril for pain. Fundus is firm with scant amount of bleeding. Incision is closed with steri strips, open to air with no drainage. Will continue to monitor for adequate pain control.

## 2019-07-04 NOTE — PROVIDER NOTIFICATION
07/04/19 0733   Provider Notification   Provider Name/Title G2 Salome   Method of Notification Electronic Page   Request Evaluate-Remote   Notification Reason Medication Request   Do you want pt to get MMR prior to d/c? If so, can you order? Thanks!

## 2019-07-08 LAB — COPATH REPORT: NORMAL

## 2020-11-05 ENCOUNTER — OFFICE VISIT (OUTPATIENT)
Dept: CARDIOLOGY | Facility: CLINIC | Age: 25
End: 2020-11-05
Payer: COMMERCIAL

## 2020-11-05 VITALS
BODY MASS INDEX: 20.31 KG/M2 | OXYGEN SATURATION: 95 % | DIASTOLIC BLOOD PRESSURE: 72 MMHG | SYSTOLIC BLOOD PRESSURE: 111 MMHG | HEART RATE: 95 BPM | WEIGHT: 129.4 LBS | HEIGHT: 67 IN

## 2020-11-05 DIAGNOSIS — Q79.60 EHLERS-DANLOS SYNDROME: Primary | ICD-10-CM

## 2020-11-05 DIAGNOSIS — I99.9 VASCULAR ABNORMALITY: ICD-10-CM

## 2020-11-05 DIAGNOSIS — I73.9 PAD (PERIPHERAL ARTERY DISEASE) (H): ICD-10-CM

## 2020-11-05 PROCEDURE — 99205 OFFICE O/P NEW HI 60 MIN: CPT | Performed by: INTERNAL MEDICINE

## 2020-11-05 RX ORDER — HYDROCODONE BITARTRATE AND ACETAMINOPHEN 5; 325 MG/1; MG/1
TABLET ORAL
Status: ON HOLD | COMMUNITY
Start: 2019-09-09 | End: 2022-05-17

## 2020-11-05 ASSESSMENT — MIFFLIN-ST. JEOR: SCORE: 1359.58

## 2020-11-05 NOTE — PROGRESS NOTES
Vascular Cardiology Consultation      HPI: This is a pleasant 25-year-old female who was a patient of the late Dr. 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  In the past she has had scanning of her head chest abdomen pelvis and lower extremities to evaluate for further involvement of vascular EDS. She is on chronic sildenafil at 20 mg daily. She is here today with her mom who also likely carries diagnosis of EDS.     Prior history: last seen in 2017 during when she was pregnant. She reported she had an issue with medication refills and thus went several months without any sildenafil.  She was on sildenafil 20 mg 3 times daily.  As a result her left foot got very swollen November significant redness and also some mild skin breakdown without development of ulceration of her fourth left toe.  Once she restarted her sildenafil her symptoms improved and the skin breakdown had been healing.  She is very careful with protecting her feet from the cold.    Since I last saw her she had 2 additional children, last one complicated by difficult C section due to adhesions. No abnormal bleeding or wound healing. Her children are going to wait until at least 5 for EDS screening.      Review of systems is negative for chest pain palpi tations nausea vomiting diaphoresis, abdominal pain, diarrhea, orthopnea, lower extremity swelling, PND. She does have headaches ongoing. She has ongoing purple discoloration, no wounds. She has worsening leg pain however both at rest and with exertion, since her pregnancies. Her mother here with her would also like to establish care in clinic, and has never had vascular testing  before.      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  At present she has no open ulcerations but she has significant pain that seems progressive since her last pregnancies. She is also now on oral contraceptive pills. We discussed that there are many hormonal changes with pregnancy that can affect angiogenesis, volume changes, blood pressure changes, and she is also on OCPs that can cause vasoconstriction and increases thrombotic risk.  Would like to re-evaluate her lower extremity and pelvic vasculature since she has had her last two C sections, one of which was somewhat complex.      We will continue sildenafil 20 mg 3 times daily which seems to offer her symptomatic relief and improve blood flow to her foot.  She has no other vascular involvement on initial screen, such as aortic disease or large vessel aneurysms/dissections. However she has congenital vascular anomalies that may or may not be syndromic versus a separate disease process.  Aggressive preventive measures will continue, such as warm socks at all times and avoiding prolonged cold exposures. Would consider additional vasodilator (norvasc, or more potent cilastazol) however we may be limited by blood pressure. Will obtain repeat CT first and go from there.    Summary:  1. ABIs  2. CT angiogram abdomen/pelvis with lower extremity run off TO THE FOOT  3. Continue 20 mg sildenafil tid   4. Will reach out to OBGYN to inquire about additional options for OCPs, IUD in the past was not placed due to her scar tissue   5. For headaches would like to reimage her head/neck vessels post pregnancy (progesterone can induce dilation and arterial changes in predisposed patients) and her last scan was over 5 years  ago  6. Will help her mother establish care in my clinic as well     Lizz Gutierrez MD MSc  [facility]         PAST MEDICAL HISTORY  Past Medical History:   Diagnosis Date     ADHD (attention deficit hyperactivity disorder)      Anxiety      Ehler's-Danlos syndrome 2013     Problem list name updated by automated process. Provider to review and confirm     Headaches      Joint hyperextensibility of multiple sites     suspect Ehler-Danlos but genetics testing not conclusive      Meckel's diverticulum     rupture of Meckel's diverticulum s/p bowel resection, approx 4 inches removed     PAD (peripheral artery disease) (H) 2012    Non-atherosclerotic PAD due to unknown arterial occlusive disease, presumed Bill-Danlos syndrome, with superimposed vasospasm      Peptic ulcer disease     secondary to prolonged NSAIDs use for headache     POTS (postural orthostatic tachycardia syndrome)      Vascular abnormality 2013     Vascular anomalies, 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, and hypoplastic right posterior tibular arteries       CURRENT MEDICATIONS  Current Outpatient Medications   Medication Sig Dispense Refill     HYDROcodone-acetaminophen (NORCO) 5-325 MG tablet TAKE ONE OR TWO TABLETS BY MOUTH EVERY SIX HOURS AS NEEDED FOR PAIN       methylphenidate (RITALIN LA) 10 MG CP24 Take 30 mg by mouth 2 tabs of 10mg in A.M and 1  10mg in P.M       PARoxetine (PAXIL) 10 MG tablet Take 1 tablet (10 mg) by mouth every morning 60 tablet 1     sildenafil (REVATIO/VIAGRA) 20 MG tablet Take 1 tablet by mouth 3 times daily. 90 tablet 3       PAST SURGICAL HISTORY:  Past Surgical History:   Procedure Laterality Date     ------------OTHER-------------  rupture Meckel diverticulum with removal of necroic bowel    Removal of      APPENDECTOMY        SECTION N/A 2015    Procedure:  SECTION;  Surgeon: Zulma Chavez MD;   Location: [facility]      SECTION N/A 2018    Procedure:  SECTION;  Repeat  Section ;  Surgeon: Jayne Smith MD;  Location: [facility]      SECTION N/A 2019    Procedure: Repeat  Section;  Surgeon: Little Fishman MD;  Location: [facility]     cesearan section      x2 2018     CHOLECYSTECTOMY         ALLERGIES     Allergies   Allergen Reactions     Nsaids GI Disturbance       FAMILY HISTORY  History reviewed. No pertinent family history.        SOCIAL HISTORY  Social History     Socioeconomic History     Marital status:      Spouse name: Not on file     Number of children: Not on file     Years of education: Not on file     Highest education level: Not on file   Occupational History     Not on file   Social Needs     Financial resource strain: Not on file     Food insecurity     Worry: Not on file     Inability: Not on file     Transportation needs     Medical: Not on file     Non-medical: Not on file   Tobacco Use     Smoking status: Current Every Day Smoker     Packs/day: 0.50     Types: Cigarettes     Smokeless tobacco: Never Used     Tobacco comment: Smoker prior to pregnancy   Substance and Sexual Activity     Alcohol use: No     Comment: (prior to pregnancy)     Drug use: No     Sexual activity: Yes     Partners: Male   Lifestyle     Physical activity     Days per week: Not on file     Minutes per session: Not on file     Stress: Not on file   Relationships     Social connections     Talks on phone: Not on file     Gets together: Not on file     Attends [employer] service: Not on file     Active member of club or organization: Not on file     Attends meetings of clubs or organizations: Not on file     Relationship status: Not on file     Intimate partner violence     Fear of current or ex partner: Not on file     Emotionally abused: Not on file     Physically abused: Not on file     Forced sexual activity: Not on file   Other Topics Concern  "    Parent/sibling w/ CABG, MI or angioplasty before 65F 55M? Not Asked   Social History Narrative     Not on file       ROS:   Constitutional: No fever, chills, or sweats. No weight gain/loss   ENT: No visual disturbance, ear ache, epistaxis, sore throat  Allergies/Immunologic: Negative  Respiratory: No cough, hemoptysia  Cardiovascular: As per HPI  GI: No nausea, vomiting, hematemesis, melena, or hematochezia  : No urinary frequency, dysuria, or hematuria  Integument: Negative  Psychiatric: Negative  Neuro: Negative  Endocrinology: Negative   Musculoskeletal: Negative  Vascular: No walking impairment, +claudication, +ischemic rest pain, no nonhealing wounds    EXAM:  /72 (BP Location: Left arm, Patient Position: Sitting, Cuff Size: Adult Regular)   Pulse 95   Ht 1.702 m (5' 7\")   Wt 58.7 kg (129 lb 6.4 oz)   SpO2 95%   BMI 20.27 kg/m    In general, the patient is a pleasant female in no apparent distress.    HEENT: NC/AT.  PERRLA.  EOMI.  Sclerae white, not injected.    Neck: Carotids +2/2 bilaterally without bruits.  No jugular venous distension.   Heart: RRR. Normal S1, S2 splits physiologically. No murmur, rub, click, or gallop. The PMI is in the 5th ICS in the midclavicular line. There is no heave.    Lungs: CTA.  No ronchi, wheezes, rales.    Abdomen: Soft, nontender, nondistended.   Extremities: No clubbing, +acrocyanosis, no edema.  No wounds. No varicose veins signs of chronic venous insufficiency.   Vascular: No bruits are noted. 2+ carotid, radial, 1+ PT, diminished DP     Labs:  LIPID RESULTS:  No results found for: CHOL, HDL, LDL, TRIG, CHOLHDLRATIO, NHDL    LIVER ENZYME RESULTS:  Lab Results   Component Value Date    AST 17 02/02/2013    ALT 13 02/02/2013       CBC RESULTS:  Lab Results   Component Value Date    WBC 11.4 (H) 07/01/2019    RBC 3.73 (L) 07/01/2019    HGB 8.4 (L) 07/02/2019    HCT 32.2 (L) 07/01/2019    MCV 86 07/01/2019    MCH 26.5 07/01/2019    MCHC 30.7 (L) 07/01/2019    " RDW 14.5 07/01/2019     07/01/2019       BMP RESULTS:  Lab Results   Component Value Date     02/02/2013    POTASSIUM 4.0 02/02/2013    CHLORIDE 107 02/02/2013    CO2 23 02/02/2013    ANIONGAP 8 02/02/2013    GLC 85 02/02/2013    BUN 17 02/02/2013    CR 0.64 02/02/2013    GFRESTIMATED >90 02/02/2013    GFRESTBLACK >90 02/02/2013    ERICA 8.7 02/02/2013        A1C RESULTS:  No results found for: A1C      Procedures:     4/1/2011   COMPARISON:    No similar studies.     TECHNIQUE:    Two-dimensional  non-contrast time of flight angiography  of the neck and head was performed. Following this, contrast-enhanced  three-dimensional angiography was performed of the neck and head.     FINDINGS:    The common and internal carotid arteries are patent  bilaterally, without significant stenosis. Both vertebral arteries are  patent as well, with the V3 segments bilaterally not well seen, likely  due to artifact. Intracranially, the anterior cerebral, middle  cerebral, and posterior cerebral arteries bilaterally are patent.     IMPRESSION:    Normal examination. No stenosis, dissection, or  abnormal dilatation.     MRA of the pelvis and peripheral vessels dated 1/21/2011.     Clinical information: None available.     Technique: Arterial 2D TOF images were obtained through the pelvis and  lower extremities. Mask images were obtained at the pelvis, thighs,  knees, and legs. Post gadolinium rapid acquisitions were obtained at  each level. A second injection and multiple rapid FLASH acquisitions,  subtracted and non-subtracted were obtained separately in the lower  abdomen and pelvis. Source images were reviewed as well as 3D and  multi-planar reconstructions.      Findings:     Aorta: The heart is normal caliber without dissection bearing. Celiac,  superior mesenteric artery, and inferior mesenteric artery are widely  patent. Single renal arteries present bilaterally.     Right lower extremity:     The right common and  external iliac artery are widely patent as are  the common femoral, deep femoral, femoral, and popliteal arteries. The  right anterior tibial artery is patent at least the level of the ankle  joint but is hypoplastic throughout its course. The tibioperoneal  trunk is widely patent and normal caliber. The posterior tibial artery  terminates approximately 9 cm from the bifurcation with creation of  multiple collaterals. Posterior tibial occlusion occurs 21 cm above  the ankle joint line. The peroneal trunk is prominent with patent flow  into the foot.     Left lower extremity:     The left common and external iliac are widely patent as on the common  femoral, deep femoral, femoral common popliteal arteries. The left  anterior tibial artery is patent at least to the level of the ankle  joint but is hypoplastic throughout its course. The tibioperoneal  trunk is widely patent and normal caliber. The posterior tibial artery  terminates approximately 2.5 cm distal to the tibioperoneal  bifurcation and 25 cm proximal to the ankle mortise. The posterior  tibial artery is prominent and widely patent with flow visualized into  the foot.     Abdomen and pelvis:       A 4.3 x 4 cm hypointense nonenhancing lesion is present in the left  adnexa either representing a cyst or endometrioma. Remainder of the  abdominal and pelvic organs are unremarkable.     Impression:     1. Occlusion of both posterior tibial arteries occurring in the  midcalf on the right and the upper calf on the left.  2. Hypoplastic bilateral anterior tibial arteries with patent flow  into the feet.  3. Prominent peroneal arteries bilaterally.  4. Nonenhancing hypointense lesion in the left adnexa. While this  adnexal lesion likely represents a benign entity such as functional  cyst, it is incompletely characterized. Ultrasound could be performed.        1/21/2011  FINDINGS:     Upper extremity:   Right upper extremity: LON-0.96     Left upper extremity:  LON-0.96     Essentially normal morphology waveforms in the upper extremities.     Lower extremity:  Right lower extremity:  Ankle/brachial index 1.04. Digit/brachial  index 0.59  Significant segmental pressure drop in the first digit as compared to  the ankle.     Left lower extremity: Ankle/brachial index 1.00. Digit/brachial index  0.61  Significant segmental pressure drop in the first digit as compared to  the ankle.     Asymmetric blunted waveforms in the right dorsalis pedis artery.     IMPRESSION:  1. Normal resting ankle/brachial indices.  2. Normal upper extremity wrist/brachial indices.  3. Abnormal bilateral decreased first digit/brachial indices. May  represent delayed reconstituted flow versus peripheral small artery  disease given MRA same date demonstrated occluded posterior tibial  arteries and narrowed anterior tibial arteries with blunted dorsalis  pedis waveforms.

## 2020-11-05 NOTE — LETTER
11/5/2020    Zackery Bowen MD  [facility] [address] [facility] [address]    RE: Anjelica E Jewel       Dear Colleague,    I had the pleasure of seeing Anjelica E Jewel in the [facility].      HPI: This is a pleasant 25-year-old female who was a patient of the late Dr. 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  In the past she has had scanning of her head chest abdomen pelvis and lower extremities to evaluate for further involvement of vascular EDS. She is on chronic sildenafil at 20 mg daily. She is here today with her mom who also likely carries diagnosis of EDS.     Prior history: last seen in 2017 during when she was pregnant. She reported she had an issue with medication refills and thus went several months without any sildenafil.  She was on sildenafil 20 mg 3 times daily.  As a result her left foot got very swollen November significant redness and also some mild skin breakdown without development of ulceration of her fourth left toe.  Once she restarted her sildenafil her symptoms improved and the skin breakdown had been healing.  She is very careful with protecting her feet from the cold.    Since I last saw her she had 2 additional children, last one complicated by difficult C section due to adhesions. No abnormal bleeding or wound healing. Her children are going to wait until at least 5 for EDS screening.      Review of systems is negative for chest pain palpi tations nausea vomiting diaphoresis, abdominal pain, diarrhea, orthopnea, lower extremity swelling, PND. She does have headaches ongoing. She has ongoing purple discoloration, no wounds.  She has worsening leg pain however both at rest and with exertion, since her pregnancies. Her mother here with her would also like to establish care in clinic, and has never had vascular testing before.      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  At present she has no open ulcerations but she has significant pain that seems progressive since her last pregnancies. She is also now on oral contraceptive pills. We discussed that there are many hormonal changes with pregnancy that can affect angiogenesis, volume changes, blood pressure changes, and she is also on OCPs that can cause vasoconstriction and increases thrombotic risk.  Would like to re-evaluate her lower extremity and pelvic vasculature since she has had her last two C sections, one of which was somewhat complex.      We will continue sildenafil 20 mg 3 times daily which seems to offer her symptomatic relief and improve blood flow to her foot.  She has no other vascular involvement on initial screen, such as aortic disease or large vessel aneurysms/dissections. However she has congenital vascular anomalies that may or may not be syndromic versus a separate disease process.  Aggressive preventive measures will continue, such as warm socks at all times and avoiding prolonged cold exposures. Would consider additional vasodilator (norvasc, or more potent cilastazol) however we may be limited by blood pressure. Will obtain repeat CT first and go from there.    Summary:  1. ABIs  2. CT angiogram abdomen/pelvis with lower extremity run off TO THE FOOT  3. Continue 20 mg sildenafil tid   4. Will reach out to OBGYN to inquire about additional options for OCPs, IUD in the past was not placed due to her scar tissue   5.  For headaches would like to reimage her head/neck vessels post pregnancy (progesterone can induce dilation and arterial changes in predisposed patients) and her last scan was over 5 years ago  6. Will help her mother establish care in my clinic as well     Lizz Gutierrez MD MSc  [facility]         PAST MEDICAL HISTORY  Past Medical History:   Diagnosis Date     ADHD (attention deficit hyperactivity disorder)      Anxiety      Ehler's-Danlos syndrome 2/1/2013     Problem list name updated by automated process. Provider to review and confirm     Headaches      Joint hyperextensibility of multiple sites     suspect Ehler-Danlos but genetics testing not conclusive      Meckel's diverticulum     rupture of Meckel's diverticulum s/p bowel resection, approx 4 inches removed     PAD (peripheral artery disease) (H) 1/25/2012    Non-atherosclerotic PAD due to unknown arterial occlusive disease, presumed Bill-Danlos syndrome, with superimposed vasospasm      Peptic ulcer disease     secondary to prolonged NSAIDs use for headache     POTS (postural orthostatic tachycardia syndrome)      Vascular abnormality 2/1/2013     Vascular anomalies, 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, and hypoplastic right posterior tibular arteries       CURRENT MEDICATIONS  Current Outpatient Medications   Medication Sig Dispense Refill     HYDROcodone-acetaminophen (NORCO) 5-325 MG tablet TAKE ONE OR TWO TABLETS BY MOUTH EVERY SIX HOURS AS NEEDED FOR PAIN       methylphenidate (RITALIN LA) 10 MG CP24 Take 30 mg by mouth 2 tabs of 10mg in A.M and 1  10mg in P.M       PARoxetine (PAXIL) 10 MG tablet Take 1 tablet (10 mg) by mouth every morning 60 tablet 1     sildenafil (REVATIO/VIAGRA) 20 MG tablet Take 1 tablet by mouth 3 times daily. 90 tablet 3       PAST SURGICAL HISTORY:  Past Surgical History:   Procedure Laterality Date     ------------OTHER-------------  rupture Meckel  diverticulum with removal of necroic bowel    Removal of      APPENDECTOMY        SECTION N/A 2015    Procedure:  SECTION;  Surgeon: Zulma Chavez MD;  Location: [facility]      SECTION N/A 2018    Procedure:  SECTION;  Repeat  Section ;  Surgeon: Jayne Smith MD;  Location: [facility]      SECTION N/A 2019    Procedure: Repeat  Section;  Surgeon: Little Fishman MD;  Location: [facility]     cesearan section      x2 2018     CHOLECYSTECTOMY         ALLERGIES     Allergies   Allergen Reactions     Nsaids GI Disturbance       FAMILY HISTORY  History reviewed. No pertinent family history.        SOCIAL HISTORY  Social History     Socioeconomic History     Marital status:      Spouse name: Not on file     Number of children: Not on file     Years of education: Not on file     Highest education level: Not on file   Occupational History     Not on file   Social Needs     Financial resource strain: Not on file     Food insecurity     Worry: Not on file     Inability: Not on file     Transportation needs     Medical: Not on file     Non-medical: Not on file   Tobacco Use     Smoking status: Current Every Day Smoker     Packs/day: 0.50     Types: Cigarettes     Smokeless tobacco: Never Used     Tobacco comment: Smoker prior to pregnancy   Substance and Sexual Activity     Alcohol use: No     Comment: (prior to pregnancy)     Drug use: No     Sexual activity: Yes     Partners: Male   Lifestyle     Physical activity     Days per week: Not on file     Minutes per session: Not on file     Stress: Not on file   Relationships     Social connections     Talks on phone: Not on file     Gets together: Not on file     Attends Restoration service: Not on file     Active member of club or organization: Not on file     Attends meetings of clubs or organizations: Not on file     Relationship status: Not on file     Intimate partner  "violence     Fear of current or ex partner: Not on file     Emotionally abused: Not on file     Physically abused: Not on file     Forced sexual activity: Not on file   Other Topics Concern     Parent/sibling w/ CABG, MI or angioplasty before 65F 55M? Not Asked   Social History Narrative     Not on file       ROS:   Constitutional: No fever, chills, or sweats. No weight gain/loss   ENT: No visual disturbance, ear ache, epistaxis, sore throat  Allergies/Immunologic: Negative  Respiratory: No cough, hemoptysia  Cardiovascular: As per HPI  GI: No nausea, vomiting, hematemesis, melena, or hematochezia  : No urinary frequency, dysuria, or hematuria  Integument: Negative  Psychiatric: Negative  Neuro: Negative  Endocrinology: Negative   Musculoskeletal: Negative  Vascular: No walking impairment, +claudication, +ischemic rest pain, no nonhealing wounds    EXAM:  /72 (BP Location: Left arm, Patient Position: Sitting, Cuff Size: Adult Regular)   Pulse 95   Ht 1.702 m (5' 7\")   Wt 58.7 kg (129 lb 6.4 oz)   SpO2 95%   BMI 20.27 kg/m    In general, the patient is a pleasant female in no apparent distress.    HEENT: NC/AT.  PERRLA.  EOMI.  Sclerae white, not injected.    Neck: Carotids +2/2 bilaterally without bruits.  No jugular venous distension.   Heart: RRR. Normal S1, S2 splits physiologically. No murmur, rub, click, or gallop. The PMI is in the 5th ICS in the midclavicular line. There is no heave.    Lungs: CTA.  No ronchi, wheezes, rales.    Abdomen: Soft, nontender, nondistended.   Extremities: No clubbing, +acrocyanosis, no edema.  No wounds. No varicose veins signs of chronic venous insufficiency.   Vascular: No bruits are noted. 2+ carotid, radial, 1+ PT, diminished DP     Labs:  LIPID RESULTS:  No results found for: CHOL, HDL, LDL, TRIG, CHOLHDLRATIO, NHDL    LIVER ENZYME RESULTS:  Lab Results   Component Value Date    AST 17 02/02/2013    ALT 13 02/02/2013       CBC RESULTS:  Lab Results   Component " Value Date    WBC 11.4 (H) 07/01/2019    RBC 3.73 (L) 07/01/2019    HGB 8.4 (L) 07/02/2019    HCT 32.2 (L) 07/01/2019    MCV 86 07/01/2019    MCH 26.5 07/01/2019    MCHC 30.7 (L) 07/01/2019    RDW 14.5 07/01/2019     07/01/2019       BMP RESULTS:  Lab Results   Component Value Date     02/02/2013    POTASSIUM 4.0 02/02/2013    CHLORIDE 107 02/02/2013    CO2 23 02/02/2013    ANIONGAP 8 02/02/2013    GLC 85 02/02/2013    BUN 17 02/02/2013    CR 0.64 02/02/2013    GFRESTIMATED >90 02/02/2013    GFRESTBLACK >90 02/02/2013    ERICA 8.7 02/02/2013        A1C RESULTS:  No results found for: A1C      Procedures:     4/1/2011   COMPARISON:    No similar studies.     TECHNIQUE:    Two-dimensional  non-contrast time of flight angiography  of the neck and head was performed. Following this, contrast-enhanced  three-dimensional angiography was performed of the neck and head.     FINDINGS:    The common and internal carotid arteries are patent  bilaterally, without significant stenosis. Both vertebral arteries are  patent as well, with the V3 segments bilaterally not well seen, likely  due to artifact. Intracranially, the anterior cerebral, middle  cerebral, and posterior cerebral arteries bilaterally are patent.     IMPRESSION:    Normal examination. No stenosis, dissection, or  abnormal dilatation.     MRA of the pelvis and peripheral vessels dated 1/21/2011.     Clinical information: None available.     Technique: Arterial 2D TOF images were obtained through the pelvis and  lower extremities. Mask images were obtained at the pelvis, thighs,  knees, and legs. Post gadolinium rapid acquisitions were obtained at  each level. A second injection and multiple rapid FLASH acquisitions,  subtracted and non-subtracted were obtained separately in the lower  abdomen and pelvis. Source images were reviewed as well as 3D and  multi-planar reconstructions.      Findings:     Aorta: The heart is normal caliber without dissection  bearing. Celiac,  superior mesenteric artery, and inferior mesenteric artery are widely  patent. Single renal arteries present bilaterally.     Right lower extremity:     The right common and external iliac artery are widely patent as are  the common femoral, deep femoral, femoral, and popliteal arteries. The  right anterior tibial artery is patent at least the level of the ankle  joint but is hypoplastic throughout its course. The tibioperoneal  trunk is widely patent and normal caliber. The posterior tibial artery  terminates approximately 9 cm from the bifurcation with creation of  multiple collaterals. Posterior tibial occlusion occurs 21 cm above  the ankle joint line. The peroneal trunk is prominent with patent flow  into the foot.     Left lower extremity:     The left common and external iliac are widely patent as on the common  femoral, deep femoral, femoral common popliteal arteries. The left  anterior tibial artery is patent at least to the level of the ankle  joint but is hypoplastic throughout its course. The tibioperoneal  trunk is widely patent and normal caliber. The posterior tibial artery  terminates approximately 2.5 cm distal to the tibioperoneal  bifurcation and 25 cm proximal to the ankle mortise. The posterior  tibial artery is prominent and widely patent with flow visualized into  the foot.     Abdomen and pelvis:       A 4.3 x 4 cm hypointense nonenhancing lesion is present in the left  adnexa either representing a cyst or endometrioma. Remainder of the  abdominal and pelvic organs are unremarkable.     Impression:     1. Occlusion of both posterior tibial arteries occurring in the  midcalf on the right and the upper calf on the left.  2. Hypoplastic bilateral anterior tibial arteries with patent flow  into the feet.  3. Prominent peroneal arteries bilaterally.  4. Nonenhancing hypointense lesion in the left adnexa. While this  adnexal lesion likely represents a benign entity such as  functional  cyst, it is incompletely characterized. Ultrasound could be performed.        1/21/2011  FINDINGS:     Upper extremity:   Right upper extremity: LON-0.96     Left upper extremity: LON-0.96     Essentially normal morphology waveforms in the upper extremities.     Lower extremity:  Right lower extremity:  Ankle/brachial index 1.04. Digit/brachial  index 0.59  Significant segmental pressure drop in the first digit as compared to  the ankle.     Left lower extremity: Ankle/brachial index 1.00. Digit/brachial index  0.61  Significant segmental pressure drop in the first digit as compared to  the ankle.     Asymmetric blunted waveforms in the right dorsalis pedis artery.     IMPRESSION:  1. Normal resting ankle/brachial indices.  2. Normal upper extremity wrist/brachial indices.  3. Abnormal bilateral decreased first digit/brachial indices. May  represent delayed reconstituted flow versus peripheral small artery  disease given MRA same date demonstrated occluded posterior tibial  arteries and narrowed anterior tibial arteries with blunted dorsalis  pedis waveforms.              Thank you for allowing me to participate in the care of your patient.    Sincerely,     Lizz Gutierrez MD     [facility]

## 2020-11-05 NOTE — LETTER
11/5/2020    Zackery Bowen MD  [facility] [address] [facility] [address]    RE: Anjelica Holloway       Dear Colleague,    I had the pleasure of seeing Anjelica E Jewel in the [facility].             Vascular Cardiology Consultation      HPI: This is a pleasant 25-year-old female who was a patient of the late Dr. 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  In the past she has had scanning of her head chest abdomen pelvis and lower extremities to evaluate for further involvement of vascular EDS. She is on chronic sildenafil at 20 mg daily. She is here today with her mom who also likely carries diagnosis of EDS.     Prior history: last seen in 2017 during when she was pregnant. She reported she had an issue with medication refills and thus went several months without any sildenafil.  She was on sildenafil 20 mg 3 times daily.  As a result her left foot got very swollen November significant redness and also some mild skin breakdown without development of ulceration of her fourth left toe.  Once she restarted her sildenafil her symptoms improved and the skin breakdown had been healing.  She is very careful with protecting her feet from the cold.    Since I last saw her she had 2 additional children, last one complicated by difficult C section due to adhesions. No abnormal bleeding or wound healing. Her children are going to wait until at least 5 for EDS screening.      Review of systems is negative for chest pain palpi tations nausea vomiting diaphoresis, abdominal pain, diarrhea, orthopnea, lower extremity swelling, PND. She does have headaches ongoing. She  has ongoing purple discoloration, no wounds. She has worsening leg pain however both at rest and with exertion, since her pregnancies. Her mother here with her would also like to establish care in clinic, and has never had vascular testing before.      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  At present she has no open ulcerations but she has significant pain that seems progressive since her last pregnancies. She is also now on oral contraceptive pills. We discussed that there are many hormonal changes with pregnancy that can affect angiogenesis, volume changes, blood pressure changes, and she is also on OCPs that can cause vasoconstriction and increases thrombotic risk.  Would like to re-evaluate her lower extremity and pelvic vasculature since she has had her last two C sections, one of which was somewhat complex.      We will continue sildenafil 20 mg 3 times daily which seems to offer her symptomatic relief and improve blood flow to her foot.  She has no other vascular involvement on initial screen, such as aortic disease or large vessel aneurysms/dissections. However she has congenital vascular anomalies that may or may not be syndromic versus a separate disease process.  Aggressive preventive measures will continue, such as warm socks at all times and avoiding prolonged cold exposures. Would consider additional vasodilator (norvasc, or more potent cilastazol) however we may be limited by blood pressure. Will obtain repeat CT first and go from there.    Summary:  1. ABIs  2. CT angiogram abdomen/pelvis with lower extremity run off TO THE FOOT  3. Continue 20 mg sildenafil tid   4. Will reach out to OBGYN to inquire about additional options for OCPs, IUD in the  past was not placed due to her scar tissue   5. For headaches would like to reimage her head/neck vessels post pregnancy (progesterone can induce dilation and arterial changes in predisposed patients) and her last scan was over 5 years ago  6. Will help her mother establish care in my clinic as well     Lizz Gutierrez MD MSc  [facility]         PAST MEDICAL HISTORY  Past Medical History:   Diagnosis Date     ADHD (attention deficit hyperactivity disorder)      Anxiety      Ehler's-Danlos syndrome 2/1/2013     Problem list name updated by automated process. Provider to review and confirm     Headaches      Joint hyperextensibility of multiple sites     suspect Ehler-Danlos but genetics testing not conclusive      Meckel's diverticulum     rupture of Meckel's diverticulum s/p bowel resection, approx 4 inches removed     PAD (peripheral artery disease) (H) 1/25/2012    Non-atherosclerotic PAD due to unknown arterial occlusive disease, presumed Bill-Danlos syndrome, with superimposed vasospasm      Peptic ulcer disease     secondary to prolonged NSAIDs use for headache     POTS (postural orthostatic tachycardia syndrome)      Vascular abnormality 2/1/2013     Vascular anomalies, 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, and hypoplastic right posterior tibular arteries       CURRENT MEDICATIONS  Current Outpatient Medications   Medication Sig Dispense Refill     HYDROcodone-acetaminophen (NORCO) 5-325 MG tablet TAKE ONE OR TWO TABLETS BY MOUTH EVERY SIX HOURS AS NEEDED FOR PAIN       methylphenidate (RITALIN LA) 10 MG CP24 Take 30 mg by mouth 2 tabs of 10mg in A.M and 1  10mg in P.M       PARoxetine (PAXIL) 10 MG tablet Take 1 tablet (10 mg) by mouth every morning 60 tablet 1     sildenafil (REVATIO/VIAGRA) 20 MG tablet Take 1 tablet by mouth 3 times daily. 90 tablet 3       PAST SURGICAL HISTORY:  Past Surgical History:   Procedure Laterality Date      ------------OTHER-------------  rupture Meckel diverticulum with removal of necroic bowel    Removal of      APPENDECTOMY        SECTION N/A 2015    Procedure:  SECTION;  Surgeon: Zulma Chavez MD;  Location: [facility] L[facility]      SECTION N/A 2018    Procedure:  SECTION;  Repeat  Section ;  Surgeon: Jayne Smith MD;  Location: [facility] L+[facility]      SECTION N/A 2019    Procedure: Repeat  Section;  Surgeon: Little Fishman MD;  Location: [facility] L+[facility]     cesearan section      x2 2018     CHOLECYSTECTOMY         ALLERGIES     Allergies   Allergen Reactions     Nsaids GI Disturbance       FAMILY HISTORY  History reviewed. No pertinent family history.        SOCIAL HISTORY  Social History     Socioeconomic History     Marital status:      Spouse name: Not on file     Number of children: Not on file     Years of education: Not on file     Highest education level: Not on file   Occupational History     Not on file   Social Needs     Financial resource strain: Not on file     Food insecurity     Worry: Not on file     Inability: Not on file     Transportation needs     Medical: Not on file     Non-medical: Not on file   Tobacco Use     Smoking status: Current Every Day Smoker     Packs/day: 0.50     Types: Cigarettes     Smokeless tobacco: Never Used     Tobacco comment: Smoker prior to pregnancy   Substance and Sexual Activity     Alcohol use: No     Comment: (prior to pregnancy)     Drug use: No     Sexual activity: Yes     Partners: Male   Lifestyle     Physical activity     Days per week: Not on file     Minutes per session: Not on file     Stress: Not on file   Relationships     Social connections     Talks on phone: Not on file     Gets together: Not on file     Attends Scientologist service: Not on file     Active member of club or organization: Not on file     Attends meetings of clubs or organizations: Not on file     Relationship  "status: Not on file     Intimate partner violence     Fear of current or ex partner: Not on file     Emotionally abused: Not on file     Physically abused: Not on file     Forced sexual activity: Not on file   Other Topics Concern     Parent/sibling w/ CABG, MI or angioplasty before 65F 55M? Not Asked   Social History Narrative     Not on file       ROS:   Constitutional: No fever, chills, or sweats. No weight gain/loss   ENT: No visual disturbance, ear ache, epistaxis, sore throat  Allergies/Immunologic: Negative  Respiratory: No cough, hemoptysia  Cardiovascular: As per HPI  GI: No nausea, vomiting, hematemesis, melena, or hematochezia  : No urinary frequency, dysuria, or hematuria  Integument: Negative  Psychiatric: Negative  Neuro: Negative  Endocrinology: Negative   Musculoskeletal: Negative  Vascular: No walking impairment, +claudication, +ischemic rest pain, no nonhealing wounds    EXAM:  /72 (BP Location: Left arm, Patient Position: Sitting, Cuff Size: Adult Regular)   Pulse 95   Ht 1.702 m (5' 7\")   Wt 58.7 kg (129 lb 6.4 oz)   SpO2 95%   BMI 20.27 kg/m    In general, the patient is a pleasant female in no apparent distress.    HEENT: NC/AT.  PERRLA.  EOMI.  Sclerae white, not injected.    Neck: Carotids +2/2 bilaterally without bruits.  No jugular venous distension.   Heart: RRR. Normal S1, S2 splits physiologically. No murmur, rub, click, or gallop. The PMI is in the 5th ICS in the midclavicular line. There is no heave.    Lungs: CTA.  No ronchi, wheezes, rales.    Abdomen: Soft, nontender, nondistended.   Extremities: No clubbing, +acrocyanosis, no edema.  No wounds. No varicose veins signs of chronic venous insufficiency.   Vascular: No bruits are noted. 2+ carotid, radial, 1+ PT, diminished DP     Labs:  LIPID RESULTS:  No results found for: CHOL, HDL, LDL, TRIG, CHOLHDLRATIO, NHDL    LIVER ENZYME RESULTS:  Lab Results   Component Value Date    AST 17 02/02/2013    ALT 13 02/02/2013 "       CBC RESULTS:  Lab Results   Component Value Date    WBC 11.4 (H) 07/01/2019    RBC 3.73 (L) 07/01/2019    HGB 8.4 (L) 07/02/2019    HCT 32.2 (L) 07/01/2019    MCV 86 07/01/2019    MCH 26.5 07/01/2019    MCHC 30.7 (L) 07/01/2019    RDW 14.5 07/01/2019     07/01/2019       BMP RESULTS:  Lab Results   Component Value Date     02/02/2013    POTASSIUM 4.0 02/02/2013    CHLORIDE 107 02/02/2013    CO2 23 02/02/2013    ANIONGAP 8 02/02/2013    GLC 85 02/02/2013    BUN 17 02/02/2013    CR 0.64 02/02/2013    GFRESTIMATED >90 02/02/2013    GFRESTBLACK >90 02/02/2013    ERICA 8.7 02/02/2013        A1C RESULTS:  No results found for: A1C      Procedures:     4/1/2011   COMPARISON:    No similar studies.     TECHNIQUE:    Two-dimensional  non-contrast time of flight angiography  of the neck and head was performed. Following this, contrast-enhanced  three-dimensional angiography was performed of the neck and head.     FINDINGS:    The common and internal carotid arteries are patent  bilaterally, without significant stenosis. Both vertebral arteries are  patent as well, with the V3 segments bilaterally not well seen, likely  due to artifact. Intracranially, the anterior cerebral, middle  cerebral, and posterior cerebral arteries bilaterally are patent.     IMPRESSION:    Normal examination. No stenosis, dissection, or  abnormal dilatation.     MRA of the pelvis and peripheral vessels dated 1/21/2011.     Clinical information: None available.     Technique: Arterial 2D TOF images were obtained through the pelvis and  lower extremities. Mask images were obtained at the pelvis, thighs,  knees, and legs. Post gadolinium rapid acquisitions were obtained at  each level. A second injection and multiple rapid FLASH acquisitions,  subtracted and non-subtracted were obtained separately in the lower  abdomen and pelvis. Source images were reviewed as well as 3D and  multi-planar reconstructions.      Findings:     Aorta: The  heart is normal caliber without dissection bearing. Celiac,  superior mesenteric artery, and inferior mesenteric artery are widely  patent. Single renal arteries present bilaterally.     Right lower extremity:     The right common and external iliac artery are widely patent as are  the common femoral, deep femoral, femoral, and popliteal arteries. The  right anterior tibial artery is patent at least the level of the ankle  joint but is hypoplastic throughout its course. The tibioperoneal  trunk is widely patent and normal caliber. The posterior tibial artery  terminates approximately 9 cm from the bifurcation with creation of  multiple collaterals. Posterior tibial occlusion occurs 21 cm above  the ankle joint line. The peroneal trunk is prominent with patent flow  into the foot.     Left lower extremity:     The left common and external iliac are widely patent as on the common  femoral, deep femoral, femoral common popliteal arteries. The left  anterior tibial artery is patent at least to the level of the ankle  joint but is hypoplastic throughout its course. The tibioperoneal  trunk is widely patent and normal caliber. The posterior tibial artery  terminates approximately 2.5 cm distal to the tibioperoneal  bifurcation and 25 cm proximal to the ankle mortise. The posterior  tibial artery is prominent and widely patent with flow visualized into  the foot.     Abdomen and pelvis:       A 4.3 x 4 cm hypointense nonenhancing lesion is present in the left  adnexa either representing a cyst or endometrioma. Remainder of the  abdominal and pelvic organs are unremarkable.     Impression:     1. Occlusion of both posterior tibial arteries occurring in the  midcalf on the right and the upper calf on the left.  2. Hypoplastic bilateral anterior tibial arteries with patent flow  into the feet.  3. Prominent peroneal arteries bilaterally.  4. Nonenhancing hypointense lesion in the left adnexa. While this  adnexal lesion likely  represents a benign entity such as functional  cyst, it is incompletely characterized. Ultrasound could be performed.        1/21/2011  FINDINGS:     Upper extremity:   Right upper extremity: LON-0.96     Left upper extremity: LON-0.96     Essentially normal morphology waveforms in the upper extremities.     Lower extremity:  Right lower extremity:  Ankle/brachial index 1.04. Digit/brachial  index 0.59  Significant segmental pressure drop in the first digit as compared to  the ankle.     Left lower extremity: Ankle/brachial index 1.00. Digit/brachial index  0.61  Significant segmental pressure drop in the first digit as compared to  the ankle.     Asymmetric blunted waveforms in the right dorsalis pedis artery.     IMPRESSION:  1. Normal resting ankle/brachial indices.  2. Normal upper extremity wrist/brachial indices.  3. Abnormal bilateral decreased first digit/brachial indices. May  represent delayed reconstituted flow versus peripheral small artery  disease given MRA same date demonstrated occluded posterior tibial  arteries and narrowed anterior tibial arteries with blunted dorsalis  pedis waveforms.              Thank you for allowing me to participate in the care of your patient.      Sincerely,     Lizz Gutierrez MD     [facility]    cc:   No referring provider defined for this encounter.

## 2020-11-05 NOTE — PATIENT INSTRUCTIONS
1. MR angiogram of head and neck (two separate orders to be scheduled together)  2. CT angiogram abd/pelvis with leg run off to the foot plus CT angiogram of the legs (two separate orders to be scheduled together)  3. ABIS   4. Follow up 3 months with Dr. Gutierrez to review  5. Increase sildenafil 20 mg three times a day

## 2020-11-10 ENCOUNTER — HOSPITAL ENCOUNTER (OUTPATIENT)
Dept: ULTRASOUND IMAGING | Facility: CLINIC | Age: 25
Discharge: HOME | End: 2020-11-10
Attending: INTERNAL MEDICINE | Admitting: INTERNAL MEDICINE
Payer: COMMERCIAL

## 2020-11-10 ENCOUNTER — TELEPHONE (OUTPATIENT)
Dept: CARDIOLOGY | Facility: CLINIC | Age: 25
End: 2020-11-10

## 2020-11-10 ENCOUNTER — HOSPITAL ENCOUNTER (OUTPATIENT)
Dept: CT IMAGING | Facility: CLINIC | Age: 25
Discharge: HOME | End: 2020-11-10
Attending: INTERNAL MEDICINE | Admitting: INTERNAL MEDICINE
Payer: COMMERCIAL

## 2020-11-10 DIAGNOSIS — Q79.60 EHLERS-DANLOS SYNDROME: ICD-10-CM

## 2020-11-10 DIAGNOSIS — Q79.60 EHLERS-DANLOS SYNDROME: Primary | ICD-10-CM

## 2020-11-10 PROCEDURE — 250N000011 HC RX IP 250 OP 636: Performed by: INTERNAL MEDICINE

## 2020-11-10 PROCEDURE — 93922 UPR/L XTREMITY ART 2 LEVELS: CPT | Mod: 26 | Performed by: INTERNAL MEDICINE

## 2020-11-10 PROCEDURE — 250N000009 HC RX 250: Performed by: INTERNAL MEDICINE

## 2020-11-10 PROCEDURE — 93922 UPR/L XTREMITY ART 2 LEVELS: CPT

## 2020-11-10 PROCEDURE — 75635 CT ANGIO ABDOMINAL ARTERIES: CPT

## 2020-11-10 RX ORDER — IOPAMIDOL 755 MG/ML
500 INJECTION, SOLUTION INTRAVASCULAR ONCE
Status: COMPLETED | OUTPATIENT
Start: 2020-11-10 | End: 2020-11-10

## 2020-11-10 RX ADMIN — IOPAMIDOL 100 ML: 755 INJECTION, SOLUTION INTRAVENOUS at 09:47

## 2020-11-10 RX ADMIN — SODIUM CHLORIDE 80 ML: 9 INJECTION, SOLUTION INTRAVENOUS at 09:47

## 2020-11-10 NOTE — TELEPHONE ENCOUNTER
RN attempted to call patient to review test results. No answer and unable to leave  d/t inbox full. RN will attempt second call at later date.         11/5/20 LON's   IMPRESSION:   Bilateral LON elevated (1.42 right, 1.44 left) with triphasic  waveforms. Elevated leg pressures compared to arm likely due to  augmented transmission of pulses due to connective tissue disorder  (rather than noncompressible vessels).     Likely no hemodynamically significant obstructive PAD.     TBI was normal (0.85) right, mildly reduced (0.62) on left.

## 2020-11-11 NOTE — TELEPHONE ENCOUNTER
Patient returned call.    RN reviewed with her the LON results. Patient verbalized understanding.    CT results pending    RN also provided patient with the direct phone number for the OBGYN referral clinic.    RN also provided patient with [employer] activation code so she could receive her test results and communicate via [employer].    Patient confirmed for RN she will have her mother call to schedule apt and testing recommended by Dr. Gutierrez.    Patient has no further questions at this time and we will update her with CT results when received.

## 2020-11-12 NOTE — TELEPHONE ENCOUNTER
RN attempted to call patient to review test results with her. RN unable to leave VM d/t inbox full. RN will send results to Dr. Gutierrez to review bilateral runoff portion of exam to inquire if further testing needed.     CTA Abd/pelvis with runnoff results  IMPRESSION:  1.  The aorta, major visceral branch vessels, bilateral iliac systems,  and bilateral femoral-popliteal systems are widely patent without  evidence of aneurysm, stenosis, or dissection.  2.  Right lower extremity with dominant peroneal runoff to the right  foot. The anterior tibial artery is diminutive but patent to the  ankle. It is not visualized distally which could represent occlusion  or CTA spatial resolution limitations. The posterior tibial artery is  not visualized beyond the mid calf.  3.  Left lower extremity with dominant peroneal runoff to the left  foot. The anterior tibial artery is diminutive and patent to just  above the ankle. Again not visualized distally either representing  occlusion or diminutive size and technical factors. The posterior  tibial artery is not visualized throughout its course consistent with  occlusion.  4.  Colonic diverticulosis without evidence of diverticulitis.     KLARISSA CARRASCO MD

## 2020-11-16 ENCOUNTER — HOSPITAL ENCOUNTER (OUTPATIENT)
Dept: MRI IMAGING | Facility: CLINIC | Age: 25
End: 2020-11-16
Attending: INTERNAL MEDICINE
Payer: COMMERCIAL

## 2020-11-16 DIAGNOSIS — Q79.60 EHLERS-DANLOS SYNDROME: ICD-10-CM

## 2020-11-16 PROCEDURE — 70549 MR ANGIOGRAPH NECK W/O&W/DYE: CPT

## 2020-11-16 PROCEDURE — 70544 MR ANGIOGRAPHY HEAD W/O DYE: CPT

## 2020-11-16 PROCEDURE — 255N000002 HC RX 255 OP 636: Performed by: INTERNAL MEDICINE

## 2020-11-16 PROCEDURE — A9585 GADOBUTROL INJECTION: HCPCS | Performed by: INTERNAL MEDICINE

## 2020-11-16 RX ORDER — GADOBUTROL 604.72 MG/ML
10 INJECTION INTRAVENOUS ONCE
Status: COMPLETED | OUTPATIENT
Start: 2020-11-16 | End: 2020-11-16

## 2020-11-16 RX ADMIN — GADOBUTROL 10 ML: 604.72 INJECTION INTRAVENOUS at 15:14

## 2020-11-18 ENCOUNTER — CARE COORDINATION (OUTPATIENT)
Dept: CARDIOLOGY | Facility: CLINIC | Age: 25
End: 2020-11-18

## 2020-11-18 NOTE — PROGRESS NOTES
MR ANGIOGRAM OF THE HEAD WITHOUT CONTRAST   11/16/2020 4:55 PM      HISTORY: Vascular EDS, migraines. Bill-Danlos syndrome.  TECHNIQUE:  3D time-of-flight MR angiogram of the head without  contrast.  COMPARISON: MRA 1/7/2015.   FINDINGS: The major intracranial arteries including the proximal  branches of the anterior cerebral, middle cerebral, and posterior  cerebral arteries appear patent without vascular cutoff. No aneurysm  identified. No significant stenosis.                                              IMPRESSION:  Normal MR angiogram of the head.        MRA NECK WITHOUT AND WITH CONTRAST  11/16/2020 4:57 PM      HISTORY: Migraines. Bill-Danlos syndrome.  TECHNIQUE: 2D time-of-flight MR angiogram of the neck without contrast  and 3D MR angiogram of the neck with  10 mL Gadavist. Estimates of  carotid stenoses are made relative to the distal internal carotid  artery diameters except as noted.   COMPARISON: Neck MRA 4/1/2011.   FINDINGS:    Normal origin of the great vessels from the aortic arch.   Right carotid artery: The right common and internal carotid arteries  are patent. No significant stenosis.  Left carotid artery: The left common and internal carotid arteries are  patent. No significant stenosis.  Vertebral arteries: Vertebral arteries appear patent without evidence  of dissection. No significant stenosis.                                                              IMPRESSION:  Normal MR angiogram of the neck.       Normal or unchanged results. Dr. Gutierrez will review and make additional comments if necessary.  Called patient to let her know results. Patient didn't answer, mailbox is currently full.       BRANDON Chavez November 18, 2020 11:34 AM

## 2020-11-19 NOTE — TELEPHONE ENCOUNTER
Order for follow up placed. Message sent to scheduling to call patient to schedule virtual with Dr. Gutierrez to review test results.     Dr. Gutierrez's recommendations    Can I have a quick virtual with Anjelica to discuss?

## 2020-11-20 ENCOUNTER — MYC MEDICAL ADVICE (OUTPATIENT)
Dept: CARDIOLOGY | Facility: CLINIC | Age: 25
End: 2020-11-20

## 2020-12-06 ENCOUNTER — HEALTH MAINTENANCE LETTER (OUTPATIENT)
Age: 25
End: 2020-12-06

## 2021-01-05 ENCOUNTER — TELEPHONE (OUTPATIENT)
Dept: CARDIOLOGY | Facility: CLINIC | Age: 26
End: 2021-01-05

## 2021-01-05 ENCOUNTER — MYC MEDICAL ADVICE (OUTPATIENT)
Dept: CARDIOLOGY | Facility: CLINIC | Age: 26
End: 2021-01-05

## 2021-01-05 DIAGNOSIS — I73.9 PAD (PERIPHERAL ARTERY DISEASE) (H): Primary | ICD-10-CM

## 2021-01-05 NOTE — TELEPHONE ENCOUNTER
Received staff message from scheduling stated that patient is scheduled to see Dr. Gutierrez 1/12/21 but the pt wants to let Brenda know 2 of her toes on her left foot has sore from being so swollen. Tried to call patient to discuss further. No answer. VM box full. Izooble message sent to patient.

## 2021-01-06 RX ORDER — AMLODIPINE BESYLATE 5 MG/1
5 TABLET ORAL DAILY
Qty: 30 TABLET | Refills: 3 | Status: SHIPPED | OUTPATIENT
Start: 2021-01-06 | End: 2021-01-12

## 2021-01-06 NOTE — TELEPHONE ENCOUNTER
RN called patient and left Vm advising patient to callback to discuss Dr. Gutierrez's recommendations.       Dr. Gutierrez's recommendations    Thanks. Please have her get lower extremity PPGs (toe) without cold challenge (resting) before our visit, and let's start amlodipine 5 mg. I assume she is still taking sildenafil 20 tid?

## 2021-01-06 NOTE — TELEPHONE ENCOUNTER
RN attempted to call patient, but no answer and no option to leave VM as VM inbox full. Rn will message patient via [employer].       [employer] message received.    You can give me a call I work at 1230 today until 4 but I m free all morning. Here are pictures of my left foot, I am in extreme amount of pain especially trying to walk.

## 2021-01-06 NOTE — TELEPHONE ENCOUNTER
"Patient returned call and advised RN that her toes normally get bad in the winter time d/t cold, but patient reports this is the worst it has been. Patient reports that she has blisters starting to form on her toes d/t edema. Patient reports she is in lots of pain and wears shoes all day d/t being unable to walk barefoot. Patient reports that her toes feel \"very tight and painful.\" Patient is still currently working, but wondering if Dr. Gutierrez has any recommendations prior to her scheduled f/u apt next week on 1/12/21. RN will send update to Dr. Gutierrez for review and recommendation.             11/10/20 LON's  IMPRESSION:   Bilateral LON elevated (1.42 right, 1.44 left) with triphasic  waveforms. Elevated leg pressures compared to arm likely due to  augmented transmission of pulses due to connective tissue disorder  (rather than noncompressible vessels).     Likely no hemodynamically significant obstructive PAD.     TBI was normal (0.85) right, mildly reduced (0.62) on left.     FINDINGS:     Right:  Arm: 94 mmHg  PT at ankle: 142 mmHg   DP at foot: 128 mmHg    LON: 1.42  TBI: 0.85     Left:  Arm: 100 mmHg   PT at ankle: 144 mmHg   DP at foot: 138 mmHg   LON: 1.38  TBI: 0.62     New LON Diagnostic Criteria:    > 1.4: Non compressible    1.00 - 1.40: Normal    0.91 - 0.99: Borderline    At or below 0.90: Abnormal     Guideline criteria published in Circulation 2011; 124: 2584-4879.     Traditional LON Diagnostic Criteria:    >/=1.3 - non compressible vessels    1.00  -1.29 - Normal    0.91 - 0.99 - Borderline    0.41 - 0.90 - Mild to moderate PAD    0.00 - 0.40 - Severe PAD     Interpretation of Post-exercise ABIs:  1. Absolute drop of 0.15 in LON.  2. Pressure drop of > 20%.       MANUEL JEAN MD    11/10/20 CTA abd/pelvis with runoff  IMPRESSION:  1.  The aorta, major visceral branch vessels, bilateral iliac systems,  and bilateral femoral-popliteal systems are widely patent without  evidence of aneurysm, " stenosis, or dissection.  2.  Right lower extremity with dominant peroneal runoff to the right  foot. The anterior tibial artery is diminutive but patent to the  ankle. It is not visualized distally which could represent occlusion  or CTA spatial resolution limitations. The posterior tibial artery is  not visualized beyond the mid calf.  3.  Left lower extremity with dominant peroneal runoff to the left  foot. The anterior tibial artery is diminutive and patent to just  above the ankle. Again not visualized distally either representing  occlusion or diminutive size and technical factors. The posterior  tibial artery is not visualized throughout its course consistent with  occlusion.  4.  Colonic diverticulosis without evidence of diverticulitis.     KLARISSA CARRASCO MD    11/5/20 visit Dr. 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  At present she has no open ulcerations but she has significant pain that seems progressive since her last pregnancies. She is also now on oral contraceptive pills. We discussed that there are many hormonal changes with pregnancy that can affect angiogenesis, volume changes, blood pressure changes, and she is also on OCPs that can cause vasoconstriction and increases thrombotic risk.  Would like to re-evaluate her lower extremity and pelvic vasculature since she has had her last two C sections, one of which was somewhat complex.      We will continue sildenafil 20 mg 3 times daily which seems to offer her symptomatic relief and improve blood flow to her foot.  She has no other vascular involvement on initial screen, such as aortic disease or large vessel aneurysms/dissections. However she has congenital vascular anomalies that may or  may not be syndromic versus a separate disease process.  Aggressive preventive measures will continue, such as warm socks at all times and avoiding prolonged cold exposures. Would consider additional vasodilator (norvasc, or more potent cilastazol) however we may be limited by blood pressure. Will obtain repeat CT first and go from there.     Summary:  1. ABIs  2. CT angiogram abdomen/pelvis with lower extremity run off TO THE FOOT  3. Continue 20 mg sildenafil tid   4. Will reach out to OBGYN to inquire about additional options for OCPs, IUD in the past was not placed due to her scar tissue   5. For headaches would like to reimage her head/neck vessels post pregnancy (progesterone can induce dilation and arterial changes in predisposed patients) and her last scan was over 5 years ago  6. Will help her mother establish care in my clinic as well      Lizz Gutierrez MD MSc  [facility]

## 2021-01-12 ENCOUNTER — HOSPITAL ENCOUNTER (OUTPATIENT)
Dept: ULTRASOUND IMAGING | Facility: CLINIC | Age: 26
Discharge: HOME | End: 2021-01-12
Attending: INTERNAL MEDICINE | Admitting: INTERNAL MEDICINE
Payer: COMMERCIAL

## 2021-01-12 ENCOUNTER — ANCILLARY PROCEDURE (OUTPATIENT)
Dept: VASCULAR ULTRASOUND | Facility: CLINIC | Age: 26
End: 2021-01-12
Attending: INTERNAL MEDICINE
Payer: COMMERCIAL

## 2021-01-12 ENCOUNTER — OFFICE VISIT (OUTPATIENT)
Dept: CARDIOLOGY | Facility: CLINIC | Age: 26
End: 2021-01-12
Payer: COMMERCIAL

## 2021-01-12 VITALS
BODY MASS INDEX: 19.89 KG/M2 | HEART RATE: 73 BPM | WEIGHT: 127 LBS | OXYGEN SATURATION: 97 % | DIASTOLIC BLOOD PRESSURE: 61 MMHG | SYSTOLIC BLOOD PRESSURE: 103 MMHG

## 2021-01-12 DIAGNOSIS — Q79.60 EHLERS-DANLOS SYNDROME: Primary | ICD-10-CM

## 2021-01-12 DIAGNOSIS — I73.9 PAD (PERIPHERAL ARTERY DISEASE) (H): ICD-10-CM

## 2021-01-12 DIAGNOSIS — Q79.60 EHLERS-DANLOS SYNDROME: ICD-10-CM

## 2021-01-12 PROCEDURE — 93922 UPR/L XTREMITY ART 2 LEVELS: CPT

## 2021-01-12 PROCEDURE — 93970 EXTREMITY STUDY: CPT | Performed by: INTERNAL MEDICINE

## 2021-01-12 PROCEDURE — 93922 UPR/L XTREMITY ART 2 LEVELS: CPT | Mod: 26 | Performed by: INTERNAL MEDICINE

## 2021-01-12 PROCEDURE — 99214 OFFICE O/P EST MOD 30 MIN: CPT | Mod: 25 | Performed by: INTERNAL MEDICINE

## 2021-01-12 RX ORDER — AMLODIPINE BESYLATE 5 MG/1
5 TABLET ORAL 2 TIMES DAILY
Qty: 30 TABLET | Refills: 3 | Status: ON HOLD | OUTPATIENT
Start: 2021-01-12 | End: 2022-05-17

## 2021-01-12 NOTE — PATIENT INSTRUCTIONS
1. Compression stockings: wear in the morning to the evening  2. Vein competency study   3. Start amlodipine 10 mg a day in one week after a week of compression, if blood pressure and symptoms tolerate  4. Continue sildenafil 20 mg three times a day   5. Follow up in 2 months with Dr. Gutierrez

## 2021-01-12 NOTE — PROGRESS NOTES
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  In the past she has had scanning of her head chest abdomen pelvis and lower extremities to evaluate for further involvement of vascular EDS. She is on chronic sildenafil at 20 mg tid.      I follow her for her severe peripheral occlusive disease. She had repeat CT testing in November that showed stability of her anatomy, still with single vessel run off to her foot. ABIs are normal bilaterally suggesting adequate flow to ankle. She had vascular studies today including PPGs that showed 1st digit abnormality on the right and left with 3rd and 5th abnormality. This is consistent with her symptoms she's having including ulcerations and blue discoloration. I started her on amlodipine 5 mg daily with improvement. She also has been having swelling for a week and is using compression stockings which also seems to be improving some. She has never had vein competency study before. She is very careful with protecting her feet from the cold and wears heated boots and blankets at night.      She has 3 children, last one complicated by difficult C section due to adhesions. No abnormal bleeding or wound healing. Her children are going to wait until at least 5 for EDS screening.      Review of systems is negative for chest pain palpitations nausea vomiting diaphoresis, abdominal pain, diarrhea, orthopnea, lower extremity swelling, PND. She does have headaches ongoing. Also with some lightheadedness.        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 ABIs are normal suggesting normal arterial supply to the ankle but ongoing issues with toe supply and this is reflective in her PPGs and TBIs.   Given new onset of swelling I suspect element of CVI which she is at risk for with EDS. She has not had a vein study and thus this is warranted as well. I am pleased there is some improvement with compression stockings and we discussed wearing these lifelong to prevent progression. Of note her MRA head/neck was normal for any abnormality in the setting of new headaches.       Summary:    1. Compression stockings: wear in the morning to the evening  2. Vein competency study   3. Start amlodipine 5 mg twice a day, in one week after a week of compression, if blood pressure and symptoms tolerate  4. Continue sildenafil 20 mg three times a day   5. Follow up in 2 months with Dr. Brenda Gutierrez MD [facility]             PAST MEDICAL HISTORY  Past Medical History:   Diagnosis Date     ADHD (attention deficit hyperactivity disorder)      Anxiety      Ehler's-Danlos syndrome 2/1/2013     Problem list name updated by automated process. Provider to review and confirm     Headaches      Joint hyperextensibility of multiple sites     suspect Ehler-Danlos but genetics testing not conclusive      Meckel's diverticulum     rupture of Meckel's diverticulum s/p bowel resection, approx 4 inches removed     PAD (peripheral artery disease) (H) 1/25/2012    Non-atherosclerotic PAD due to unknown arterial occlusive disease, presumed Bill-Danlos syndrome, with superimposed vasospasm      Peptic ulcer disease     secondary to prolonged NSAIDs use for headache     POTS (postural orthostatic tachycardia syndrome)      Vascular abnormality 2/1/2013     Vascular anomalies, congenital     hypoplastic anterior  tibular arteries and absent left posterior tibular arteries, and hypoplastic right posterior tibular arteries       CURRENT MEDICATIONS  Current Outpatient Medications   Medication Sig Dispense Refill     amLODIPine (NORVASC) 5 MG tablet Take 1 tablet (5 mg) by mouth daily 30 tablet 3     HYDROcodone-acetaminophen (NORCO) 5-325 MG tablet TAKE ONE OR TWO TABLETS BY MOUTH EVERY SIX HOURS AS NEEDED FOR PAIN       methylphenidate (RITALIN LA) 10 MG CP24 Take 30 mg by mouth 2 tabs of 10mg in A.M and 1  10mg in P.M       PARoxetine (PAXIL) 10 MG tablet Take 1 tablet (10 mg) by mouth every morning 60 tablet 1     sildenafil (REVATIO/VIAGRA) 20 MG tablet Take 1 tablet by mouth 3 times daily. 90 tablet 3       PAST SURGICAL HISTORY:  Past Surgical History:   Procedure Laterality Date     ------------OTHER-------------  rupture Meckel diverticulum with removal of necroic bowel    Removal of      APPENDECTOMY        SECTION N/A 2015    Procedure:  SECTION;  Surgeon: Zulma Chavez MD;  Location: [facility]      SECTION N/A 2018    Procedure:  SECTION;  Repeat  Section ;  Surgeon: Jayne Smith MD;  Location: [facility]      SECTION N/A 2019    Procedure: Repeat  Section;  Surgeon: Little Fishman MD;  Location: [facility]     cesearan section      x2 2018     CHOLECYSTECTOMY         ALLERGIES     Allergies   Allergen Reactions     Nsaids GI Disturbance       FAMILY HISTORY  History reviewed. No pertinent family history.    SOCIAL HISTORY  Social History     Socioeconomic History     Marital status:      Spouse name: Not on file     Number of children: Not on file     Years of education: Not on file     Highest education level: Not on file   Occupational History     Not on file   Social Needs     Financial resource strain: Not on file     Food insecurity     Worry: Not on file     Inability: Not on file     Transportation needs      Medical: Not on file     Non-medical: Not on file   Tobacco Use     Smoking status: Current Every Day Smoker     Packs/day: 0.50     Types: Cigarettes     Smokeless tobacco: Never Used     Tobacco comment: Smoker prior to pregnancy   Substance and Sexual Activity     Alcohol use: No     Comment: (prior to pregnancy)     Drug use: No     Sexual activity: Yes     Partners: Male   Lifestyle     Physical activity     Days per week: Not on file     Minutes per session: Not on file     Stress: Not on file   Relationships     Social connections     Talks on phone: Not on file     Gets together: Not on file     Attends Mandaen service: Not on file     Active member of club or organization: Not on file     Attends meetings of clubs or organizations: Not on file     Relationship status: Not on file     Intimate partner violence     Fear of current or ex partner: Not on file     Emotionally abused: Not on file     Physically abused: Not on file     Forced sexual activity: Not on file   Other Topics Concern     Parent/sibling w/ CABG, MI or angioplasty before 65F 55M? Not Asked   Social History Narrative     Not on file       ROS:   Constitutional: No fever, chills, or sweats. No weight gain/loss   ENT: No visual disturbance, ear ache, epistaxis, sore throat  Allergies/Immunologic: Negative  Respiratory: No cough, hemoptysia  Cardiovascular: As per HPI  GI: No nausea, vomiting, hematemesis, melena, or hematochezia  : No urinary frequency, dysuria, or hematuria  Integument: Negative  Psychiatric: Negative  Neuro: Negative  Endocrinology: Negative   Musculoskeletal: Negative  Vascular: See HPI     EXAM:  /61   Pulse 73   Wt 57.6 kg (127 lb)   SpO2 97%   BMI 19.89 kg/m    In general, the patient is a pleasant female in no apparent distress.    HEENT: NC/AT.  PERRLA.  EOMI.  Sclerae white, not injected.  Nares clear.  Pharynx without erythema or exudate.  Dentition intact.    Neck: No adenopathy.  No thyromegaly.  Carotids +2/2 bilaterally without bruits.  No jugular venous distension.   Heart: RRR. Normal S1, S2 splits physiologically.  Lungs: CTA.  No ronchi, wheezes, rales.  No dullness to percussion.   Abdomen: Soft, nontender, nondistended. No organomegaly. No AAA.  No bruits.   Extremities: No clubbing, cyanosis, 1+ edema.  No wounds.   Vascular: No bruits are noted. 1+ DP pulses. She has 1st and 3rd left digit blue discoloration. !st toe on the right.     Labs:  LIPID RESULTS:  No results found for: CHOL, HDL, LDL, TRIG, CHOLHDLRATIO, NHDL    LIVER ENZYME RESULTS:  Lab Results   Component Value Date    AST 17 02/02/2013    ALT 13 02/02/2013       CBC RESULTS:  Lab Results   Component Value Date    WBC 11.4 (H) 07/01/2019    RBC 3.73 (L) 07/01/2019    HGB 8.4 (L) 07/02/2019    HCT 32.2 (L) 07/01/2019    MCV 86 07/01/2019    MCH 26.5 07/01/2019    MCHC 30.7 (L) 07/01/2019    RDW 14.5 07/01/2019     07/01/2019       BMP RESULTS:  Lab Results   Component Value Date     02/02/2013    POTASSIUM 4.0 02/02/2013    CHLORIDE 107 02/02/2013    CO2 23 02/02/2013    ANIONGAP 8 02/02/2013    GLC 85 02/02/2013    BUN 17 02/02/2013    CR 0.64 02/02/2013    GFRESTIMATED >90 02/02/2013    GFRESTBLACK >90 02/02/2013    ERICA 8.7 02/02/2013        A1C RESULTS:  No results found for: A1C

## 2021-01-12 NOTE — LETTER
1/12/2021    Zackery Bowen MD  No address on file    RE: Anjelica E Jewel       Dear Colleague,    I had the pleasure of seeing Anjelica E Jewel in the [facility].        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  In the past she has had scanning of her head chest abdomen pelvis and lower extremities to evaluate for further involvement of vascular EDS. She is on chronic sildenafil at 20 mg tid.      I follow her for her severe peripheral occlusive disease. She had repeat CT testing in November that showed stability of her anatomy, still with single vessel run off to her foot. ABIs are normal bilaterally suggesting adequate flow to ankle. She had vascular studies today including PPGs that showed 1st digit abnormality on the right and left with 3rd and 5th abnormality. This is consistent with her symptoms she's having including ulcerations and blue discoloration. I started her on amlodipine 5 mg daily with improvement. She also has been having swelling for a week and is using compression stockings which also seems to be improving some. She has never had vein competency study before. She is very careful with protecting her feet from the cold and wears heated boots and blankets at night.      She has 3 children, last one complicated by difficult C section due to adhesions. No abnormal bleeding or wound healing. Her children are going to wait until at least 5 for EDS screening.      Review of systems is negative for chest pain palpitations nausea vomiting diaphoresis, abdominal pain, diarrhea, orthopnea, lower extremity swelling, PND.  She does have headaches ongoing. Also with some lightheadedness.        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 ABIs are normal suggesting normal arterial supply to the ankle but ongoing issues with toe supply and this is reflective in her PPGs and TBIs.   Given new onset of swelling I suspect element of CVI which she is at risk for with EDS. She has not had a vein study and thus this is warranted as well. I am pleased there is some improvement with compression stockings and we discussed wearing these lifelong to prevent progression. Of note her MRA head/neck was normal for any abnormality in the setting of new headaches.       Summary:    1. Compression stockings: wear in the morning to the evening  2. Vein competency study   3. Start amlodipine 5 mg twice a day, in one week after a week of compression, if blood pressure and symptoms tolerate  4. Continue sildenafil 20 mg three times a day   5. Follow up in 2 months with Dr. Brenda Gutierrez MD [facility]             PAST MEDICAL HISTORY  Past Medical History:   Diagnosis Date     ADHD (attention deficit hyperactivity disorder)      Anxiety      Ehler's-Danlos syndrome 2/1/2013     Problem list name updated by automated process. Provider to review and confirm     Headaches      Joint hyperextensibility of multiple sites     suspect Ehler-Danlos but genetics testing not conclusive      Meckel's diverticulum     rupture of Meckel's diverticulum s/p bowel resection, approx 4 inches removed     PAD (peripheral artery disease) (H) 1/25/2012    Non-atherosclerotic PAD due to unknown arterial occlusive disease, presumed Bill-Danlos syndrome, with superimposed vasospasm      Peptic ulcer disease      secondary to prolonged NSAIDs use for headache     POTS (postural orthostatic tachycardia syndrome)      Vascular abnormality 2013     Vascular anomalies, 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, and hypoplastic right posterior tibular arteries       CURRENT MEDICATIONS  Current Outpatient Medications   Medication Sig Dispense Refill     amLODIPine (NORVASC) 5 MG tablet Take 1 tablet (5 mg) by mouth daily 30 tablet 3     HYDROcodone-acetaminophen (NORCO) 5-325 MG tablet TAKE ONE OR TWO TABLETS BY MOUTH EVERY SIX HOURS AS NEEDED FOR PAIN       methylphenidate (RITALIN LA) 10 MG CP24 Take 30 mg by mouth 2 tabs of 10mg in A.M and 1  10mg in P.M       PARoxetine (PAXIL) 10 MG tablet Take 1 tablet (10 mg) by mouth every morning 60 tablet 1     sildenafil (REVATIO/VIAGRA) 20 MG tablet Take 1 tablet by mouth 3 times daily. 90 tablet 3       PAST SURGICAL HISTORY:  Past Surgical History:   Procedure Laterality Date     ------------OTHER-------------  rupture Meckel diverticulum with removal of necroic bowel    Removal of      APPENDECTOMY        SECTION N/A 2015    Procedure:  SECTION;  Surgeon: Zulma Chavez MD;  [facility]: [facility]      SECTION N/A 2018    Procedure:  SECTION;  Repeat  Section ;  Surgeon: Jayne Smith MD;  [facility]      SECTION N/A 2019    Procedure: Repeat  Section;  Surgeon: Little Fishman MD;  [facility]     cesearan section      x2 2018     CHOLECYSTECTOMY         ALLERGIES     Allergies   Allergen Reactions     Nsaids GI Disturbance       FAMILY HISTORY  History reviewed. No pertinent family history.    SOCIAL HISTORY  Social History     Socioeconomic History     Marital status:      Spouse name: Not on file     Number of children: Not on file     Years of education: Not on file     Highest education level: Not on  file   Occupational History     Not on file   Social Needs     Financial resource strain: Not on file     Food insecurity     Worry: Not on file     Inability: Not on file     Transportation needs     Medical: Not on file     Non-medical: Not on file   Tobacco Use     Smoking status: Current Every Day Smoker     Packs/day: 0.50     Types: Cigarettes     Smokeless tobacco: Never Used     Tobacco comment: Smoker prior to pregnancy   Substance and Sexual Activity     Alcohol use: No     Comment: (prior to pregnancy)     Drug use: No     Sexual activity: Yes     Partners: Male   Lifestyle     Physical activity     Days per week: Not on file     Minutes per session: Not on file     Stress: Not on file   Relationships     Social connections     Talks on phone: Not on file     Gets together: Not on file     Attends Hinduism service: Not on file     Active member of club or organization: Not on file     Attends meetings of clubs or organizations: Not on file     Relationship status: Not on file     Intimate partner violence     Fear of current or ex partner: Not on file     Emotionally abused: Not on file     Physically abused: Not on file     Forced sexual activity: Not on file   Other Topics Concern     Parent/sibling w/ CABG, MI or angioplasty before 65F 55M? Not Asked   Social History Narrative     Not on file       ROS:   Constitutional: No fever, chills, or sweats. No weight gain/loss   ENT: No visual disturbance, ear ache, epistaxis, sore throat  Allergies/Immunologic: Negative  Respiratory: No cough, hemoptysia  Cardiovascular: As per HPI  GI: No nausea, vomiting, hematemesis, melena, or hematochezia  : No urinary frequency, dysuria, or hematuria  Integument: Negative  Psychiatric: Negative  Neuro: Negative  Endocrinology: Negative   Musculoskeletal: Negative  Vascular: See HPI     EXAM:  /61   Pulse 73   Wt 57.6 kg (127 lb)   SpO2 97%   BMI 19.89 kg/m    In general, the patient is a pleasant female in  no apparent distress.    HEENT: NC/AT.  PERRLA.  EOMI.  Sclerae white, not injected.  Nares clear.  Pharynx without erythema or exudate.  Dentition intact.    Neck: No adenopathy.  No thyromegaly. Carotids +2/2 bilaterally without bruits.  No jugular venous distension.   Heart: RRR. Normal S1, S2 splits physiologically.  Lungs: CTA.  No ronchi, wheezes, rales.  No dullness to percussion.   Abdomen: Soft, nontender, nondistended. No organomegaly. No AAA.  No bruits.   Extremities: No clubbing, cyanosis, 1+ edema.  No wounds.   Vascular: No bruits are noted. 1+ DP pulses. She has 1st and 3rd left digit blue discoloration. !st toe on the right.     Labs:  LIPID RESULTS:  No results found for: CHOL, HDL, LDL, TRIG, CHOLHDLRATIO, NHDL    LIVER ENZYME RESULTS:  Lab Results   Component Value Date    AST 17 02/02/2013    ALT 13 02/02/2013       CBC RESULTS:  Lab Results   Component Value Date    WBC 11.4 (H) 07/01/2019    RBC 3.73 (L) 07/01/2019    HGB 8.4 (L) 07/02/2019    HCT 32.2 (L) 07/01/2019    MCV 86 07/01/2019    MCH 26.5 07/01/2019    MCHC 30.7 (L) 07/01/2019    RDW 14.5 07/01/2019     07/01/2019       BMP RESULTS:  Lab Results   Component Value Date     02/02/2013    POTASSIUM 4.0 02/02/2013    CHLORIDE 107 02/02/2013    CO2 23 02/02/2013    ANIONGAP 8 02/02/2013    GLC 85 02/02/2013    BUN 17 02/02/2013    CR 0.64 02/02/2013    GFRESTIMATED >90 02/02/2013    GFRESTBLACK >90 02/02/2013    ERICA 8.7 02/02/2013        A1C RESULTS:  No results found for: A1C          Thank you for allowing me to participate in the care of your patient.      Sincerely,     Lizz Gutierrez MD     [facility]    cc:   No referring provider defined for this encounter.

## 2021-01-13 ENCOUNTER — TELEPHONE (OUTPATIENT)
Dept: CARDIOLOGY | Facility: CLINIC | Age: 26
End: 2021-01-13

## 2021-01-13 DIAGNOSIS — I99.9 VASCULAR ABNORMALITY: ICD-10-CM

## 2021-01-13 DIAGNOSIS — I73.9 PAD (PERIPHERAL ARTERY DISEASE) (H): ICD-10-CM

## 2021-01-13 NOTE — TELEPHONE ENCOUNTER
Results noted 21:    US lower extremity PPGs:    Findings:     Right:       1st Digit PPG: Moderately abnormal   2nd Digit PPG: Mildly abnormal   3rd Digit PPG: Mildly abnormal   4th Digit PPG: Normal   5th Digit PPG: Normal      Left:      1st Digit PPG: Moderately abnormal   2nd Digit PPG: Moderate-severely abnormal   3rd Digit PPG: Moderate-severely abnormal   4th Digit PPG: Moderately abnormal   5th Digit PPG: Moderate-severely abnormal      DAVION GUTIERREZ MD    US venous competency:    Impression:     1. Right lea. No deep or superficial vein thrombus.  1b. No deep or superficial vein reflux.  1c. No incompetent varicose veins or perforators.  1d. Only 1 posterior tibial vein could be identified.     2. Left lea. No deep or superficial vein thrombus.  2b. No deep or superficial vein reflux.  2c. No incompetent varicose veins or perforators.  2d. No posterior tibial veins could be identified.     DAVION GUTIERREZ MD    Will route to Dr. Gutierrez for review.

## 2021-01-13 NOTE — TELEPHONE ENCOUNTER
Lizz Gutierrez MD  You 8 minutes ago (9:28 AM)     Eron Stewart:     We went over results of PPGS together in clinic yesterday. Can release to patient.   Vein competency study is normal. 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 Continue plan of care as outlined in my note. Thanks! If she has any progression of symptoms she should call us and we will change our plan.     Dr. Gutierrez     Message text      Results released to [employer] with Dr. Gutierrez's comments.

## 2021-01-26 ENCOUNTER — MYC MEDICAL ADVICE (OUTPATIENT)
Dept: CARDIOLOGY | Facility: CLINIC | Age: 26
End: 2021-01-26

## 2021-01-26 RX ORDER — SILDENAFIL CITRATE 20 MG/1
TABLET ORAL
Qty: 1 TABLET | Refills: 0 | COMMUNITY
Start: 2021-01-26 | End: 2021-04-26

## 2021-01-26 NOTE — CONFIDENTIAL NOTE
Message from scheduling regarding sore on 4th toe developing in 7 days. Patient called last week and stated that her 4th toe was very swollen/painful. She states that she had to spend a few days elevating her feet and had to call into work due to this. MyChart message regarding 3rd toe with sore on it. Patient states the sore on her 4th toe is about the size of a pea. Patient states that this is the fastest a sore has appeared on her toe.     She's also wondering about receiving documentation regarding a letter to excuse her from work for the days she's had to miss work due to the painful swelling in her feet. Given patient's symptoms, message sent back to scheduling to get an appointment with Dr. Gutierrez. Will route to Dr. Gutierrez and Yvonne Leon for review.        1/12/21 OV w/ Dr. 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 ABIs are normal suggesting normal arterial supply to the ankle but ongoing issues with toe supply and this is reflective in her PPGs and TBIs.   Given new onset of swelling I suspect element of CVI which she is at risk for with EDS. She has not had a vein study and thus this is warranted as well. I am pleased there is some improvement with compression stockings and we discussed wearing these lifelong to prevent progression. Of note her MRA head/neck was normal for any abnormality in the setting of new headaches.        Summary:     1. Compression stockings: wear in the morning to the evening  2. Vein competency study   3. Start amlodipine 5 mg twice a day, in one week after a week of compression, if blood pressure and symptoms tolerate  4. Continue sildenafil 20 mg three times a day   5. Follow up in 2 months with Dr. Brenda Chavez, RN January  26, 2021 12:08 PM

## 2021-01-26 NOTE — TELEPHONE ENCOUNTER
Called patient to go over recommendations. Patient didn't answer, left VM. Will type up letter once we have specific dates she needs excused. [employer] message sent to patient for further information.           BRANDON Chavez January 26, 2021 3:04 PM

## 2021-01-26 NOTE — TELEPHONE ENCOUNTER
Spoke to patient and she agrees to increasing sildenafil 40 mg in the am and keeping the other 2 times at 20 mg.     Discussed the letter patient needed and she said she just needed a letter stating that she has this vascular issue and that she has multiple appointments and imaging. Wondering if patient should look into FMLA. Pt has a phone call with her work HR today and will ask if FMLA would be better than a letter. Pt will update team 4 on what HR would like from us.     Pt would still like Dr. Gutierrez to review the concern of her toe sore and if she should do anything other than increase the am dose of sildenafil. Pt will contact team 4 if it continues to worsen or has any concerns in the mean time.

## 2021-01-26 NOTE — TELEPHONE ENCOUNTER
Eron Leigh-    Please let Anjeilca know that I would like her to go up on the sildenafil to 40 mg in the am and remain on 20 mg the other 2 times per day (discussed with Dr. Alanis). I will be happy to provide a note for her to excuse her from work. Can you please gather more information about her what dates she needed or needs to be off work?     MADELYN Quinonez, CNP

## 2021-02-01 NOTE — CONFIDENTIAL NOTE
Thanks Reese.     The Toe sore was improving somewhat in office, so increase the sildenafil and please see if it continues to get better. We are trying to avoid an inpatient admission for vasodilation by IV but that is always an option if needed.     Best,   Dr. Brenda Rader message sent to patient to continue on increased Sildenafil dose.       BRANDON Chavez February 1, 2021 11:33 AM

## 2021-02-02 NOTE — TELEPHONE ENCOUNTER
MyChart received from patient regarding clarification of toe sores. Will route to Dr. Gutierrez for review.       BRANDON Chavez February 2, 2021 12:15 PM

## 2021-02-03 NOTE — TELEPHONE ENCOUNTER
RN will reply to patient via [employer].       Dr. Gutierrez's response.      Oh I see. Thanks for letting me know. Yes let's call her in a few days to see how it is doing. She can take serial photos if needed to send to us as well.     Dr. Gutierrez    Message text

## 2021-03-08 ENCOUNTER — OFFICE VISIT (OUTPATIENT)
Dept: CARDIOLOGY | Facility: CLINIC | Age: 26
End: 2021-03-08
Payer: COMMERCIAL

## 2021-03-08 VITALS
BODY MASS INDEX: 19.11 KG/M2 | HEART RATE: 84 BPM | SYSTOLIC BLOOD PRESSURE: 108 MMHG | DIASTOLIC BLOOD PRESSURE: 69 MMHG | WEIGHT: 122 LBS

## 2021-03-08 DIAGNOSIS — Q79.60 EHLERS-DANLOS SYNDROME: ICD-10-CM

## 2021-03-08 PROCEDURE — 99214 OFFICE O/P EST MOD 30 MIN: CPT | Performed by: INTERNAL MEDICINE

## 2021-03-08 NOTE — PROGRESS NOTES
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 In the past she has had scanning of her head chest abdomen pelvis and lower extremities to evaluate for further involvement of vascular EDS. She is on chronic sildenafil at 20 mg tid.      I follow her for her severe peripheral occlusive disease. She had repeat CT testing in November that showed stability of her anatomy, still with single vessel run off to her foot. ABIs are normal bilaterally suggesting adequate flow to ankle. She had vascular studies today including PPGs that showed 1st digit abnormality on the right and left with 3rd and 5th abnormality. This is consistent with her symptoms she's having including ulcerations and blue discoloration. I started her on amlodipine 5 mg daily with improvement. She also has been having swelling for a week and is using compression stockings which also seems to be improving some. She has never had vein competency study before. She is very careful with protecting her feet from the cold and wears heated boots and blankets at night.      She has 3 children, last one complicated by difficult C section due to adhesions. No abnormal bleeding or wound healing. Her children are going to wait until at least 5 for EDS screening.      Review of systems is negative for chest pain palpitations nausea vomiting diaphoresis, abdominal pain, diarrhea, orthopnea, lower extremity swelling, PND. She does have headaches ongoing. Also with some lightheadedness.     We reviewed her leg imaging:      Impression:     1. Right lea. No deep or superficial vein thrombus.  1b. No deep or  superficial vein reflux.  1c. No incompetent varicose veins or perforators.  1d. Only 1 posterior tibial vein could be identified.     2. Left lea. No deep or superficial vein thrombus.  2b. No deep or superficial vein reflux.  2c. No incompetent varicose veins or perforators.  2d. No posterior tibial veins could be identified.    Right:       1st Digit PPG: Moderately abnormal   2nd Digit PPG: Mildly abnormal   3rd Digit PPG: Mildly abnormal   4th Digit PPG: Normal   5th Digit PPG: Normal      Left:      1st Digit PPG: Moderately abnormal   2nd Digit PPG: Moderate-severely abnormal   3rd Digit PPG: Moderate-severely abnormal   4th Digit PPG: Moderately abnormal   5th Digit PPG: Moderate-severely abnormal      She was increased on amlodipine to 5 mg twice a day but didn't tolerate due to dizziness. She has increased her sildenafil to 20 mg three times a day. Ulcerations are healing and coloration is improved. This may be due to weather change however. She has no significant toe pain. She is on ROS today describing jaw locking and popping, and also painful intercourse s/p her C section. Asking about bladder prolapse, vaginal prolapse and cystocele which her mother with EDS has encountered post pregnancy. Wondering about urology referral and who to see about her jaw symptoms.       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 ABIs are normal suggesting normal arterial supply to the ankle but ongoing issues with toe supply and this is reflective in her PPGs and TBIs. She is maximized on vasodilator therapy now and doing better but still with purple discoloration but ulcers are healing well.     Summary:     1. Compression stockings: wear in the morning to the  evening  2. Vein competency study was negative   3. amlodipine 5 mg once a day  4. Continue sildenafil 20 mg three times a day   5. Follow up in 4 months with Dr. Gutierrez   6. Urology and OMFS referrals      PAST MEDICAL HISTORY  Past Medical History:   Diagnosis Date     ADHD (attention deficit hyperactivity disorder)      Anxiety      Ehler's-Danlos syndrome 2/1/2013     Problem list name updated by automated process. Provider to review and confirm     Headaches      Joint hyperextensibility of multiple sites     suspect Ehler-Danlos but genetics testing not conclusive      Meckel's diverticulum     rupture of Meckel's diverticulum s/p bowel resection, approx 4 inches removed     PAD (peripheral artery disease) (H) 1/25/2012    Non-atherosclerotic PAD due to unknown arterial occlusive disease, presumed Bill-Danlos syndrome, with superimposed vasospasm      Peptic ulcer disease     secondary to prolonged NSAIDs use for headache     POTS (postural orthostatic tachycardia syndrome)      Vascular abnormality 2/1/2013     Vascular anomalies, 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, and hypoplastic right posterior tibular arteries       CURRENT MEDICATIONS  Current Outpatient Medications   Medication Sig Dispense Refill     amLODIPine (NORVASC) 5 MG tablet Take 1 tablet (5 mg) by mouth 2 times daily 30 tablet 3     HYDROcodone-acetaminophen (NORCO) 5-325 MG tablet TAKE ONE OR TWO TABLETS BY MOUTH EVERY SIX HOURS AS NEEDED FOR PAIN       methylphenidate (RITALIN LA) 10 MG CP24 Take 30 mg by mouth 2 tabs of 10mg in A.M and 1  10mg in P.M       PARoxetine (PAXIL) 10 MG tablet Take 1 tablet (10 mg) by mouth every morning 60 tablet 1     sildenafil (REVATIO) 20 MG tablet Take 40 mg in the am, 20 mg in the afternoon and pm (three times total in a day) 1 tablet 0       PAST SURGICAL HISTORY:  Past Surgical History:   Procedure Laterality Date      ------------OTHER-------------  rupture Meckel diverticulum with removal of necroic bowel    Removal of      APPENDECTOMY        SECTION N/A 2015    Procedure:  SECTION;  Surgeon: Zulma Chavez MD;  Location: [facility]      SECTION N/A 2018    Procedure:  SECTION;  Repeat  Section ;  Surgeon: Jayne Smith MD;  Location: [facility]      SECTION N/A 2019    Procedure: Repeat  Section;  Surgeon: Little Fishman MD;  Location: [facility]     cesearan section      x2 2018     CHOLECYSTECTOMY         ALLERGIES     Allergies   Allergen Reactions     Nsaids GI Disturbance       FAMILY HISTORY  No family history on file.      SOCIAL HISTORY  Social History     Socioeconomic History     Marital status:      Spouse name: Not on file     Number of children: Not on file     Years of education: Not on file     Highest education level: Not on file   Occupational History     Not on file   Social Needs     Financial resource strain: Not on file     Food insecurity     Worry: Not on file     Inability: Not on file     Transportation needs     Medical: Not on file     Non-medical: Not on file   Tobacco Use     Smoking status: Current Every Day Smoker     Packs/day: 0.50     Types: Cigarettes     Smokeless tobacco: Never Used     Tobacco comment: Smoker prior to pregnancy   Substance and Sexual Activity     Alcohol use: No     Comment: (prior to pregnancy)     Drug use: No     Sexual activity: Yes     Partners: Male   Lifestyle     Physical activity     Days per week: Not on file     Minutes per session: Not on file     Stress: Not on file   Relationships     Social connections     Talks on phone: Not on file     Gets together: Not on file     Attends Scientologist service: Not on file     Active member of club or organization: Not on file     Attends meetings of clubs or organizations: Not on file     Relationship status: Not on file      Intimate partner violence     Fear of current or ex partner: Not on file     Emotionally abused: Not on file     Physically abused: Not on file     Forced sexual activity: Not on file   Other Topics Concern     Parent/sibling w/ CABG, MI or angioplasty before 65F 55M? Not Asked   Social History Narrative     Not on file       ROS:   Constitutional: No fever, chills, or sweats. No weight gain/loss   ENT: No visual disturbance, ear ache, epistaxis, sore throat  Allergies/Immunologic: Negative  Respiratory: No cough, hemoptysia  Cardiovascular: As per HPI  GI: No nausea, vomiting, hematemesis, melena, or hematochezia  : No urinary frequency, dysuria, or hematuria  Integument: Negative  Psychiatric: Negative  Neuro: Negative  Endocrinology: Negative   Musculoskeletal: Negative  Vascular: No walking impairment, claudication, ischemic rest pain or nonhealing wounds    EXAM:  /69   Pulse 84   Wt 55.3 kg (122 lb)   BMI 19.11 kg/m    In general, the patient is a pleasant female in no apparent distress.    HEENT: NC/AT.  PERRLA.  EOMI.  Sclerae white, not injected.  Nares clear.  Pharynx without erythema or exudate.  Dentition intact.    Neck: No adenopathy.  No thyromegaly. Carotids +2/2 bilaterally without bruits.  No jugular venous distension.   Heart: RRR. Normal S1, S2 splits physiologically. No murmur, rub, click, or gallop. The PMI is in the 5th ICS in the midclavicular line. There is no heave.    Lungs: CTA.  No ronchi, wheezes, rales.  No dullness to percussion.   Abdomen: Soft, nontender, nondistended. No organomegaly. No AAA.  No bruits.   Extremities: No clubbing. Distal toes are mildly cool to touch, cyanotic, with intact 2+ DP pulses.   Vascular: No bruits are noted.    Labs:  LIPID RESULTS:  No results found for: CHOL, HDL, LDL, TRIG, CHOLHDLRATIO, NHDL    LIVER ENZYME RESULTS:  Lab Results   Component Value Date    AST 17 02/02/2013    ALT 13 02/02/2013       CBC RESULTS:  Lab Results    Component Value Date    WBC 11.4 (H) 07/01/2019    RBC 3.73 (L) 07/01/2019    HGB 8.4 (L) 07/02/2019    HCT 32.2 (L) 07/01/2019    MCV 86 07/01/2019    MCH 26.5 07/01/2019    MCHC 30.7 (L) 07/01/2019    RDW 14.5 07/01/2019     07/01/2019       BMP RESULTS:  Lab Results   Component Value Date     02/02/2013    POTASSIUM 4.0 02/02/2013    CHLORIDE 107 02/02/2013    CO2 23 02/02/2013    ANIONGAP 8 02/02/2013    GLC 85 02/02/2013    BUN 17 02/02/2013    CR 0.64 02/02/2013    GFRESTIMATED >90 02/02/2013    GFRESTBLACK >90 02/02/2013    ERICA 8.7 02/02/2013        A1C RESULTS:  No results found for: A1C

## 2021-03-08 NOTE — PATIENT INSTRUCTIONS
"1. Fax your PA forms to \"[employer]\" Daniel Graft   2. Continue current medications  3. Referral to oral maxillo facial surgery for jaw pain   4. Referral to \"Uro Gyn\" for evaluation of bladder or vaginal prolapse   5. Follow up with Dr. Gutierrez in 4 months     "

## 2021-03-08 NOTE — LETTER
3/8/2021    Zackery Bowen MD  [address] [address]    RE: Anjelica Holloway       Dear Colleague,    I had the pleasure of seeing Anjelica E Jewel in the [facility].      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 In the past she has had scanning of her head chest abdomen pelvis and lower extremities to evaluate for further involvement of vascular EDS. She is on chronic sildenafil at 20 mg tid.      I follow her for her severe peripheral occlusive disease. She had repeat CT testing in November that showed stability of her anatomy, still with single vessel run off to her foot. ABIs are normal bilaterally suggesting adequate flow to ankle. She had vascular studies today including PPGs that showed 1st digit abnormality on the right and left with 3rd and 5th abnormality. This is consistent with her symptoms she's having including ulcerations and blue discoloration. I started her on amlodipine 5 mg daily with improvement. She also has been having swelling for a week and is using compression stockings which also seems to be improving some. She has never had vein competency study before. She is very careful with protecting her feet from the cold and wears heated boots and blankets at night.      She has 3 children, last one complicated by difficult C section due to adhesions. No abnormal bleeding or wound healing. Her children are going to wait until at least 5 for EDS screening.      Review of systems is negative for chest pain palpitations nausea vomiting diaphoresis, abdominal pain,  diarrhea, orthopnea, lower extremity swelling, PND. She does have headaches ongoing. Also with some lightheadedness.     We reviewed her leg imaging:      Impression:     1. Right lea. No deep or superficial vein thrombus.  1b. No deep or superficial vein reflux.  1c. No incompetent varicose veins or perforators.  1d. Only 1 posterior tibial vein could be identified.     2. Left lea. No deep or superficial vein thrombus.  2b. No deep or superficial vein reflux.  2c. No incompetent varicose veins or perforators.  2d. No posterior tibial veins could be identified.    Right:       1st Digit PPG: Moderately abnormal   2nd Digit PPG: Mildly abnormal   3rd Digit PPG: Mildly abnormal   4th Digit PPG: Normal   5th Digit PPG: Normal      Left:      1st Digit PPG: Moderately abnormal   2nd Digit PPG: Moderate-severely abnormal   3rd Digit PPG: Moderate-severely abnormal   4th Digit PPG: Moderately abnormal   5th Digit PPG: Moderate-severely abnormal      She was increased on amlodipine to 5 mg twice a day but didn't tolerate due to dizziness. She has increased her sildenafil to 20 mg three times a day. Ulcerations are healing and coloration is improved. This may be due to weather change however. She has no significant toe pain. She is on ROS today describing jaw locking and popping, and also painful intercourse s/p her C section. Asking about bladder prolapse, vaginal prolapse and cystocele which her mother with EDS has encountered post pregnancy. Wondering about urology referral and who to see about her jaw symptoms.       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 ABIs are normal suggesting normal arterial supply to the ankle but ongoing issues with toe  supply and this is reflective in her PPGs and TBIs. She is maximized on vasodilator therapy now and doing better but still with purple discoloration but ulcers are healing well.     Summary:     1. Compression stockings: wear in the morning to the evening  2. Vein competency study was negative   3. amlodipine 5 mg once a day  4. Continue sildenafil 20 mg three times a day   5. Follow up in 4 months with Dr. Gutierrez   6. Urology and OMFS referrals      PAST MEDICAL HISTORY  Past Medical History:   Diagnosis Date     ADHD (attention deficit hyperactivity disorder)      Anxiety      Ehler's-Danlos syndrome 2/1/2013     Problem list name updated by automated process. Provider to review and confirm     Headaches      Joint hyperextensibility of multiple sites     suspect Ehler-Danlos but genetics testing not conclusive      Meckel's diverticulum     rupture of Meckel's diverticulum s/p bowel resection, approx 4 inches removed     PAD (peripheral artery disease) (H) 1/25/2012    Non-atherosclerotic PAD due to unknown arterial occlusive disease, presumed Bill-Danlos syndrome, with superimposed vasospasm      Peptic ulcer disease     secondary to prolonged NSAIDs use for headache     POTS (postural orthostatic tachycardia syndrome)      Vascular abnormality 2/1/2013     Vascular anomalies, 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, and hypoplastic right posterior tibular arteries       CURRENT MEDICATIONS  Current Outpatient Medications   Medication Sig Dispense Refill     amLODIPine (NORVASC) 5 MG tablet Take 1 tablet (5 mg) by mouth 2 times daily 30 tablet 3     HYDROcodone-acetaminophen (NORCO) 5-325 MG tablet TAKE ONE OR TWO TABLETS BY MOUTH EVERY SIX HOURS AS NEEDED FOR PAIN       methylphenidate (RITALIN LA) 10 MG CP24 Take 30 mg by mouth 2 tabs of 10mg in A.M and 1  10mg in P.M       PARoxetine (PAXIL) 10 MG tablet Take 1 tablet (10 mg) by mouth every morning 60  tablet 1     sildenafil (REVATIO) 20 MG tablet Take 40 mg in the am, 20 mg in the afternoon and pm (three times total in a day) 1 tablet 0       PAST SURGICAL HISTORY:  Past Surgical History:   Procedure Laterality Date     ------------OTHER-------------  rupture Meckel diverticulum with removal of necroic bowel    Removal of      APPENDECTOMY        SECTION N/A 2015    Procedure:  SECTION;  Surgeon: Zulma Chavez MD;  [facility]: [facility]      SECTION N/A 2018    Procedure:  SECTION;  Repeat  Section ;  Surgeon: Jayne Smith MD;  Location: [facility]      SECTION N/A 2019    Procedure: Repeat  Section;  Surgeon: Little Fishman MD;  Location: [facility]     cesearan section      x2 2018     CHOLECYSTECTOMY         ALLERGIES     Allergies   Allergen Reactions     Nsaids GI Disturbance       FAMILY HISTORY  No family history on file.      SOCIAL HISTORY  Social History     Socioeconomic History     Marital status:      Spouse name: Not on file     Number of children: Not on file     Years of education: Not on file     Highest education level: Not on file   Occupational History     Not on file   Social Needs     Financial resource strain: Not on file     Food insecurity     Worry: Not on file     Inability: Not on file     Transportation needs     Medical: Not on file     Non-medical: Not on file   Tobacco Use     Smoking status: Current Every Day Smoker     Packs/day: 0.50     Types: Cigarettes     Smokeless tobacco: Never Used     Tobacco comment: Smoker prior to pregnancy   Substance and Sexual Activity     Alcohol use: No     Comment: (prior to pregnancy)     Drug use: No     Sexual activity: Yes     Partners: Male   Lifestyle     Physical activity     Days per week: Not on file     Minutes per session: Not on file     Stress: Not on file   Relationships     Social connections     Talks on phone: Not on file      Gets together: Not on file     Attends Restorationism service: Not on file     Active member of club or organization: Not on file     Attends meetings of clubs or organizations: Not on file     Relationship status: Not on file     Intimate partner violence     Fear of current or ex partner: Not on file     Emotionally abused: Not on file     Physically abused: Not on file     Forced sexual activity: Not on file   Other Topics Concern     Parent/sibling w/ CABG, MI or angioplasty before 65F 55M? Not Asked   Social History Narrative     Not on file       ROS:   Constitutional: No fever, chills, or sweats. No weight gain/loss   ENT: No visual disturbance, ear ache, epistaxis, sore throat  Allergies/Immunologic: Negative  Respiratory: No cough, hemoptysia  Cardiovascular: As per HPI  GI: No nausea, vomiting, hematemesis, melena, or hematochezia  : No urinary frequency, dysuria, or hematuria  Integument: Negative  Psychiatric: Negative  Neuro: Negative  Endocrinology: Negative   Musculoskeletal: Negative  Vascular: No walking impairment, claudication, ischemic rest pain or nonhealing wounds    EXAM:  /69   Pulse 84   Wt 55.3 kg (122 lb)   BMI 19.11 kg/m    In general, the patient is a pleasant female in no apparent distress.    HEENT: NC/AT.  PERRLA.  EOMI.  Sclerae white, not injected.  Nares clear.  Pharynx without erythema or exudate.  Dentition intact.    Neck: No adenopathy.  No thyromegaly. Carotids +2/2 bilaterally without bruits.  No jugular venous distension.   Heart: RRR. Normal S1, S2 splits physiologically. No murmur, rub, click, or gallop. The PMI is in the 5th ICS in the midclavicular line. There is no heave.    Lungs: CTA.  No ronchi, wheezes, rales.  No dullness to percussion.   Abdomen: Soft, nontender, nondistended. No organomegaly. No AAA.  No bruits.   Extremities: No clubbing. Distal toes are mildly cool to touch, cyanotic, with intact 2+ DP pulses.   Vascular: No bruits are  noted.    Labs:  LIPID RESULTS:  No results found for: CHOL, HDL, LDL, TRIG, CHOLHDLRATIO, NHDL    LIVER ENZYME RESULTS:  Lab Results   Component Value Date    AST 17 02/02/2013    ALT 13 02/02/2013       CBC RESULTS:  Lab Results   Component Value Date    WBC 11.4 (H) 07/01/2019    RBC 3.73 (L) 07/01/2019    HGB 8.4 (L) 07/02/2019    HCT 32.2 (L) 07/01/2019    MCV 86 07/01/2019    MCH 26.5 07/01/2019    MCHC 30.7 (L) 07/01/2019    RDW 14.5 07/01/2019     07/01/2019       BMP RESULTS:  Lab Results   Component Value Date     02/02/2013    POTASSIUM 4.0 02/02/2013    CHLORIDE 107 02/02/2013    CO2 23 02/02/2013    ANIONGAP 8 02/02/2013    GLC 85 02/02/2013    BUN 17 02/02/2013    CR 0.64 02/02/2013    GFRESTIMATED >90 02/02/2013    GFRESTBLACK >90 02/02/2013    ERICA 8.7 02/02/2013        A1C RESULTS:  No results found for: A1C    Thank you for allowing me to participate in the care of your patient.      Sincerely,     Lizz Gutierrez MD     [facility]    cc:   Zackery Bowen MD  [address] [address]

## 2021-03-24 ENCOUNTER — CARE COORDINATION (OUTPATIENT)
Dept: CARDIOLOGY | Facility: CLINIC | Age: 26
End: 2021-03-24

## 2021-03-24 NOTE — PROGRESS NOTES
Called patient to discuss paperwork for Dr. Gutierrez to sign. Patient will be at [facility] tomorrow and can drop off paperwork. Will fill out and mail patient copy of paperwork.       BRANDON Chavez March 24, 2021 10:38 AM

## 2021-04-19 ENCOUNTER — OFFICE VISIT (OUTPATIENT)
Dept: UROLOGY | Facility: CLINIC | Age: 26
End: 2021-04-19
Attending: INTERNAL MEDICINE
Payer: COMMERCIAL

## 2021-04-19 VITALS
BODY MASS INDEX: 18.21 KG/M2 | DIASTOLIC BLOOD PRESSURE: 70 MMHG | SYSTOLIC BLOOD PRESSURE: 117 MMHG | HEIGHT: 67 IN | WEIGHT: 116 LBS | HEART RATE: 94 BPM

## 2021-04-19 DIAGNOSIS — N94.10 DYSPAREUNIA IN FEMALE: Primary | ICD-10-CM

## 2021-04-19 DIAGNOSIS — Q79.60 EHLERS-DANLOS SYNDROME: ICD-10-CM

## 2021-04-19 PROCEDURE — G0463 HOSPITAL OUTPT CLINIC VISIT: HCPCS

## 2021-04-19 PROCEDURE — 99215 OFFICE O/P EST HI 40 MIN: CPT | Performed by: OBSTETRICS & GYNECOLOGY

## 2021-04-19 RX ORDER — LEVONORGESTREL/ETHIN.ESTRADIOL 0.1-0.02MG
1 TABLET ORAL DAILY
Status: ON HOLD | COMMUNITY
Start: 2020-08-28 | End: 2022-05-17

## 2021-04-19 ASSESSMENT — MIFFLIN-ST. JEOR: SCORE: 1298.92

## 2021-04-19 ASSESSMENT — PAIN SCALES - GENERAL: PAINLEVEL: NO PAIN (0)

## 2021-04-19 NOTE — PROGRESS NOTES
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     Sexual function/Pelvic floor pain/GYN:   Has pain with intercourse since her second delivery ( ) with both entry and deep penetration. Not improved by body position. Also has some menstrual cramps. On Aviane for OCP    Urinary Symptoms/Voiding function  Denies bothersome stress or urgency leakage. Denies urinary urgency, frequency, nocturia. Denies hx of recurrent UTI or gross hematuria. Used to have recurrent UTI several years ago but resolved    Pelvic Organ Prolapse Symptoms  Denies vaginal bulge or pressure sensation.    Gastrointestinal Symptoms:  Denies chronic diarrhea, constipation. Denies bothersome fecal or flatal incontinence.         Relevant Medical History:    Diabetes? no  High Blood pressure? no     Recurrent UTIs? no  Sleep Apnea? no  Obesity? no  History of Blood clots? no  Other medical problems: Bill-danlos, PAD    Surgical History:      Past Surgical History:   Procedure Laterality Date     ------------OTHER-------------  rupture Meckel diverticulum with removal of necroic bowel    Removal of      APPENDECTOMY        SECTION N/A 2015    Procedure:  SECTION;  Surgeon: Zulma Chavez MD;  [facility]      SECTION N/A 2018    Procedure:  SECTION;  Repeat  Section ;  Surgeon: Jayne Smith MD;  [facility]      SECTION N/A 2019    Procedure: Repeat  Section;  Surgeon: Little Fishman MD;   [facility]     cesearan section      x2 2018     CHOLECYSTECTOMY       OTHER SURGICAL HISTORY      Bowel resection for ruptured meckel's diverticulum       OB/Gyn History:  OB History    Para Term  AB Living   3 3 3 0 0 3   SAB TAB Ectopic Multiple Live Births   0 0 0 0 3      # Outcome Date GA Lbr Lewis/2nd Weight Sex Delivery Anes PTL Lv   3 Term 19 38w0d  3.26 kg (7 lb 3 oz) M   N KEYON      Name: SARAH,MALE-SIDNEY      Apgar1: 4  Apgar5: 7   2 Term 18 38w0d  3.49 kg (7 lb 11.1 oz) F  Spinal N KEYON      Name: [email or web address] SIDNEY      Apgar1: 9  Apgar5: 9   1 Term 05/20/15 38w0d  3.204 kg (7 lb 1 oz) F CS-LTranv  N KEYON      Apgar1: 9  Apgar5: 9       Medications/Vitamins/Supplements:   Current Outpatient Medications   Medication     amLODIPine (NORVASC) 5 MG tablet     HYDROcodone-acetaminophen (NORCO) 5-325 MG tablet     levonorgestrel-ethinyl estradiol (AVIANE) 0.1-20 MG-MCG tablet     methylphenidate (RITALIN LA) 10 MG CP24     PARoxetine (PAXIL) 10 MG tablet     sildenafil (REVATIO) 20 MG tablet     No current facility-administered medications for this visit.          Medical History:      Past Medical History:   Diagnosis Date     ADHD (attention deficit hyperactivity disorder)      Anxiety      Ehler's-Danlos syndrome 2013     Problem list name updated by automated process. Provider to review and confirm     Headaches      Joint hyperextensibility of multiple sites     suspect Ehler-Danlos but genetics testing not conclusive      Meckel's diverticulum     rupture of Meckel's diverticulum s/p bowel resection, approx 4 inches removed     PAD (peripheral artery disease) (H) 2012    Non-atherosclerotic PAD due to unknown arterial occlusive disease, presumed Bill-Danlos syndrome, with superimposed vasospasm      Peptic ulcer disease     secondary to prolonged NSAIDs use for headache     POTS (postural orthostatic tachycardia syndrome)      Vascular abnormality 2013      Vascular anomalies, 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, and hypoplastic right posterior tibular arteries     ROS  Social History    Social History     Socioeconomic History     Marital status:      Spouse name: Not on file     Number of children: Not on file     Years of education: Not on file     Highest education level: Not on file   Occupational History     Not on file   Social Needs     Financial resource strain: Not on file     Food insecurity     Worry: Not on file     Inability: Not on file     Transportation needs     Medical: Not on file     Non-medical: Not on file   Tobacco Use     Smoking status: Current Every Day Smoker     Packs/day: 0.50     Types: Cigarettes     Smokeless tobacco: Never Used     Tobacco comment: Smoker prior to pregnancy   Substance and Sexual Activity     Alcohol use: No     Comment: (prior to pregnancy)     Drug use: No     Sexual activity: Yes     Partners: Male   Lifestyle     Physical activity     Days per week: Not on file     Minutes per session: Not on file     Stress: Not on file   Relationships     Social connections     Talks on phone: Not on file     Gets together: Not on file     Attends Nondenominational service: Not on file     Active member of club or organization: Not on file     Attends meetings of clubs or organizations: Not on file     Relationship status: Not on file     Intimate partner violence     Fear of current or ex partner: Not on file     Emotionally abused: Not on file     Physically abused: Not on file     Forced sexual activity: Not on file   Other Topics Concern     Parent/sibling w/ CABG, MI or angioplasty before 65F 55M? Not Asked   Social History Narrative     Not on file       Family History  No family history on file.    Allergy    Allergies   Allergen Reactions     Ibuprofen Other (See Comments)     all NASAIDS -  Ulcers     Nsaids GI Disturbance       Current Outpatient Medications  "  Medication     amLODIPine (NORVASC) 5 MG tablet     HYDROcodone-acetaminophen (NORCO) 5-325 MG tablet     methylphenidate (RITALIN LA) 10 MG CP24     PARoxetine (PAXIL) 10 MG tablet     sildenafil (REVATIO) 20 MG tablet     No current facility-administered medications for this visit.        Physical Exam:   /70   Pulse 94   Ht 1.702 m (5' 7.01\")   Wt 52.6 kg (116 lb)   LMP 04/09/2021   BMI 18.16 kg/m      Ht 1.702 m (5' 7.01\")   Wt 52.6 kg (116 lb)   LMP 04/09/2021   BMI 18.16 kg/m   Patient's last menstrual period was 04/09/2021. Body mass index is 18.16 kg/m .    Gen:  is alert, comfortable in no acute distress,   Abdomen: Abdomen is soft, non-tender, non-distended,   Lungs: non-labored breathing.     Pelvic Exam:   Normal external female genitalia. The urethra was normal.    Vagina: well supported. Diffuse levator myalgia in iliococcygeus and obturator internus muscles, no abnormal discharge  Uterus: normal size, non tender. Normal cervix  Ovaries: non palpable, no masses  Vulva: no vestibular pain, no lesion  Rectal: deferred    Pelvic floor strength: 2/5 kegels.    Pelvic floor muscles: Baseline elevater muscle tone, poor relaxation, Diffuse levator myalgia in iliococcygeus and obturator internus muscles    POPQ EXAM FOR PROLAPSE SEVERITY  No prolapse    Voiding trial:  None done    Labs:   Color Urine (no units)   Date Value   05/10/2007 Yellow     Appearance Urine (no units)   Date Value   05/10/2007 Clear     Glucose Urine (mg/dL)   Date Value   05/10/2007 Negative     Bilirubin Urine (no units)   Date Value   05/10/2007 Negative     Ketones Urine (mg/dL)   Date Value   05/10/2007 Negative     Specific Gravity Urine (no units)   Date Value   05/10/2007 1.018     pH Urine (pH)   Date Value   05/10/2007 6.5     Protein Albumin Urine (mg/dL)   Date Value   05/10/2007 Negative     Nitrite Urine (no units)   Date Value   05/10/2007 Negative     Leukocyte Esterase Urine (no units)   Date Value "   05/10/2007 Negative     CBC RESULTS:   Recent Labs   Lab Test 07/02/19  0635 07/01/19  0916   WBC  --  11.4*   RBC  --  3.73*   HGB 8.4* 9.9*   HCT  --  32.2*   MCV  --  86   MCH  --  26.5   MCHC  --  30.7*   RDW  --  14.5   PLT  --  304       Echocardiogram 12/11/21  Interpretation Summary  Global and regional left ventricular function is normal with an EF of 60-65%.   Right ventricular function, chamber size, wall motion, and thickness are   normal. The aorta root is normal.  PatientHeight: 67 in  PatientWeight: 106 lbs  SystolicPressure: 122 mmHg  DiastolicPressure: 62 mmHg  HeartRate: 88 bpm  BSA 1.5 m^2         A/P: Anjelica Holloway is a 25 year old F with    Anjelica was seen today for consult.    Diagnoses and all orders for this visit:    Dyspareunia in female  -     PHYSICAL THERAPY REFERRAL; Future    Ehler's-Danlos syndrome  -     UROLOGY ADULT REFERRAL    No prolapse  Has levator myalgia which explains her dyspareunia and low abdominal cramping      Referral for pelvic PT placed. Lives in [facility] so ext referral     I spent a total of 45 minutes with  Anjelica Holloway  on the date of the encounter in chart review, face to face patient visit, review of tests, documentation and/or discussion with other providers about the issues documented above.     Elvira Mercado MD, [facility]  , Department of OBGYN  Female Pelvic Medicine and Reconstructive Surgery ( Urogynecology)  CC  Patient Care Team:  Zackery Bowen MD as PCP - Teresa Zee RN as Registered Nurse (Perinatology)  Thu Henriquez RN as Registered Nurse  Han Hester RN as Nurse Coordinator (Vascular Medicine)  Lizz Kilpatrick MD as MD (Vascular Medicine)  Lizz Kilpatrick MD as Assigned Heart and Vascular Provider  LIZZ KILPATRICK

## 2021-04-19 NOTE — TELEPHONE ENCOUNTER
PA Initiation    Medication: sildenafil (REVATIO) 20 MG tablet   Insurance Company: [employer] - Phone [phone] Fax [phone]  Pharmacy Filling the Rx: [facility] [address] [place] [address]  Filling Pharmacy Phone: [phone]  Filling Pharmacy Fax: [phone]  Start Date: 4/19/2021

## 2021-04-19 NOTE — PROGRESS NOTES
ELIAZAR received from patient, stating that [employer] no longer has a program for Revatio and she isn't sure what to do next, as she is almost out of the medication. Contacted patient to review further. Patient states that she was notified by [employer] when she called to inquire about her application renewal status that they no longer have an assistance program for [employer]. Patient was previously getting her medication through this program. Patient currently has about 20 tablets left, and she takes 3 tablets a day. RN reviewed with patient that we will contact the pharmacy liaison to see what options are there for this medication.

## 2021-04-19 NOTE — PROGRESS NOTES
"I would first as central PA team to pursue a prior auth through [facility].  Since patient is almost out of medication, I would request they send in the PA as \"urgent.\"  I anticipate this may be denied as patient does not have an FDA indication for the medication, but it may be approved on the basis of continuation of therapy since patient has a long history of being effectively managed on this medication.       If the prior auth is denied, I would definitely try an appeal, in which you can further explain the patient's history and ongoing need for this medication (appeals can also be requested as urgent).     In the meantime, patient could take advantage of [facility] discount mail order pharmacy.  They change $15 for every 30 tablets of sildenafil 20mg.  Physician can e-prescribe to them, and all patient needs to do is create an account and put a payment on file and the medication will be shipped to her.     JUAN A Fine, Pharmacy Technician/Liaison, Discharge Pharmacy [phone]         Will route to PA team to see if we can run an urgent PA.       BRANDON Chavez April 19, 2021 1:51 PM     "

## 2021-04-19 NOTE — LETTER
2021       RE: Anjelica Holloway  [address]  [facility] [address]     Dear Colleague,    Thank you for referring your patient, Anjelica Holloway, to the [facility] [place] at [facility]. Please see a copy of my visit note below.    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     Sexual function/Pelvic floor pain/GYN:   Has pain with intercourse since her second delivery ( ) with both entry and deep penetration. Not improved by body position. Also has some menstrual cramps. On Aviane for OCP    Urinary Symptoms/Voiding function  Denies bothersome stress or urgency leakage. Denies urinary urgency, frequency, nocturia. Denies hx of recurrent UTI or gross hematuria. Used to have recurrent UTI several years ago but resolved    Pelvic Organ Prolapse Symptoms  Denies vaginal bulge or pressure sensation.    Gastrointestinal Symptoms:  Denies chronic diarrhea, constipation. Denies bothersome fecal or flatal incontinence.         Relevant Medical History:    Diabetes? no  High Blood pressure? no     Recurrent UTIs? no  Sleep Apnea? no  Obesity? no  History of Blood clots? no  Other medical problems: Bill-danlos, PAD    Surgical History:      Past Surgical History:   Procedure Laterality Date     ------------OTHER-------------  rupture Meckel diverticulum with removal of necroic bowel    Removal of      APPENDECTOMY        SECTION N/A 2015    Procedure:  SECTION;  Surgeon:  Zulma Chavez MD;  Location: [facility] L[facility]      SECTION N/A 2018    Procedure:  SECTION;  Repeat  Section ;  Surgeon: Jayne Smith MD;  Location: [facility]      SECTION N/A 2019    Procedure: Repeat  Section;  Surgeon: Little Fishman MD;  Location: [facility] L+[facility]     cesearan section      x2 2018     CHOLECYSTECTOMY       OTHER SURGICAL HISTORY      Bowel resection for ruptured meckel's diverticulum       OB/Gyn History:  OB History    Para Term  AB Living   3 3 3 0 0 3   SAB TAB Ectopic Multiple Live Births   0 0 0 0 3      # Outcome Date GA Lbr Lewis/2nd Weight Sex Delivery Anes PTL Lv   3 Term 19 38w0d  3.26 kg (7 lb 3 oz) M   N KEYON      Name: SARAHMALE-SIDNEY      Apgar1: 4  Apgar5: 7   2 Term 18 38w0d  3.49 kg (7 lb 11.1 oz) F  Spinal N KEYON      Name: SAVANNA SMITH1 SIDNEY      Apgar1: 9  Apgar5: 9   1 Term 05/20/15 38w0d  3.204 kg (7 lb 1 oz) F CS-LTranv  N KEYON      Apgar1: 9  Apgar5: 9       Medications/Vitamins/Supplements:   Current Outpatient Medications   Medication     amLODIPine (NORVASC) 5 MG tablet     HYDROcodone-acetaminophen (NORCO) 5-325 MG tablet     levonorgestrel-ethinyl estradiol (AVIANE) 0.1-20 MG-MCG tablet     methylphenidate (RITALIN LA) 10 MG CP24     PARoxetine (PAXIL) 10 MG tablet     sildenafil (REVATIO) 20 MG tablet     No current facility-administered medications for this visit.          Medical History:      Past Medical History:   Diagnosis Date     ADHD (attention deficit hyperactivity disorder)      Anxiety      Ehler's-Danlos syndrome 2013     Problem list name updated by automated process. Provider to review and confirm     Headaches      Joint hyperextensibility of multiple sites     suspect Ehler-Danlos but genetics testing not conclusive      Meckel's diverticulum     rupture of Meckel's diverticulum s/p bowel resection, approx 4 inches removed     PAD (peripheral artery  disease) (H) 1/25/2012    Non-atherosclerotic PAD due to unknown arterial occlusive disease, presumed Bill-Danlos syndrome, with superimposed vasospasm      Peptic ulcer disease     secondary to prolonged NSAIDs use for headache     POTS (postural orthostatic tachycardia syndrome)      Vascular abnormality 2/1/2013     Vascular anomalies, 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, and hypoplastic right posterior tibular arteries     ROS  Social History    Social History     Socioeconomic History     Marital status:      Spouse name: Not on file     Number of children: Not on file     Years of education: Not on file     Highest education level: Not on file   Occupational History     Not on file   Social Needs     Financial resource strain: Not on file     Food insecurity     Worry: Not on file     Inability: Not on file     Transportation needs     Medical: Not on file     Non-medical: Not on file   Tobacco Use     Smoking status: Current Every Day Smoker     Packs/day: 0.50     Types: Cigarettes     Smokeless tobacco: Never Used     Tobacco comment: Smoker prior to pregnancy   Substance and Sexual Activity     Alcohol use: No     Comment: (prior to pregnancy)     Drug use: No     Sexual activity: Yes     Partners: Male   Lifestyle     Physical activity     Days per week: Not on file     Minutes per session: Not on file     Stress: Not on file   Relationships     Social connections     Talks on phone: Not on file     Gets together: Not on file     Attends Muslim service: Not on file     Active member of club or organization: Not on file     Attends meetings of clubs or organizations: Not on file     Relationship status: Not on file     Intimate partner violence     Fear of current or ex partner: Not on file     Emotionally abused: Not on file     Physically abused: Not on file     Forced sexual activity: Not on file   Other Topics Concern      "Parent/sibling w/ CABG, MI or angioplasty before 65F 55M? Not Asked   Social History Narrative     Not on file       Family History  No family history on file.    Allergy    Allergies   Allergen Reactions     Ibuprofen Other (See Comments)     all NASAIDS -  Ulcers     Nsaids GI Disturbance       Current Outpatient Medications   Medication     amLODIPine (NORVASC) 5 MG tablet     HYDROcodone-acetaminophen (NORCO) 5-325 MG tablet     methylphenidate (RITALIN LA) 10 MG CP24     PARoxetine (PAXIL) 10 MG tablet     sildenafil (REVATIO) 20 MG tablet     No current facility-administered medications for this visit.        Physical Exam:   /70   Pulse 94   Ht 1.702 m (5' 7.01\")   Wt 52.6 kg (116 lb)   LMP 04/09/2021   BMI 18.16 kg/m      Ht 1.702 m (5' 7.01\")   Wt 52.6 kg (116 lb)   LMP 04/09/2021   BMI 18.16 kg/m   Patient's last menstrual period was 04/09/2021. Body mass index is 18.16 kg/m .    Gen:  is alert, comfortable in no acute distress,   Abdomen: Abdomen is soft, non-tender, non-distended,   Lungs: non-labored breathing.     Pelvic Exam:   Normal external female genitalia. The urethra was normal.    Vagina: well supported. Diffuse levator myalgia in iliococcygeus and obturator internus muscles, no abnormal discharge  Uterus: normal size, non tender. Normal cervix  Ovaries: non palpable, no masses  Vulva: no vestibular pain, no lesion  Rectal: deferred    Pelvic floor strength: 2/5 kegels.    Pelvic floor muscles: Baseline elevater muscle tone, poor relaxation, Diffuse levator myalgia in iliococcygeus and obturator internus muscles    POPQ EXAM FOR PROLAPSE SEVERITY  No prolapse    Voiding trial:  None done    Labs:   Color Urine (no units)   Date Value   05/10/2007 Yellow     Appearance Urine (no units)   Date Value   05/10/2007 Clear     Glucose Urine (mg/dL)   Date Value   05/10/2007 Negative     Bilirubin Urine (no units)   Date Value   05/10/2007 Negative     Ketones Urine (mg/dL)   Date Value "   05/10/2007 Negative     Specific Gravity Urine (no units)   Date Value   05/10/2007 1.018     pH Urine (pH)   Date Value   05/10/2007 6.5     Protein Albumin Urine (mg/dL)   Date Value   05/10/2007 Negative     Nitrite Urine (no units)   Date Value   05/10/2007 Negative     Leukocyte Esterase Urine (no units)   Date Value   05/10/2007 Negative     CBC RESULTS:   Recent Labs   Lab Test 07/02/19  0635 07/01/19  0916   WBC  --  11.4*   RBC  --  3.73*   HGB 8.4* 9.9*   HCT  --  32.2*   MCV  --  86   MCH  --  26.5   MCHC  --  30.7*   RDW  --  14.5   PLT  --  304       Echocardiogram 12/11/21  Interpretation Summary  Global and regional left ventricular function is normal with an EF of 60-65%.   Right ventricular function, chamber size, wall motion, and thickness are   normal. The aorta root is normal.  PatientHeight: 67 in  PatientWeight: 106 lbs  SystolicPressure: 122 mmHg  DiastolicPressure: 62 mmHg  HeartRate: 88 bpm  BSA 1.5 m^2         A/P: Anjelica Holloway is a 25 year old F with    Anjelica was seen today for consult.    Diagnoses and all orders for this visit:    Dyspareunia in female  -     PHYSICAL THERAPY REFERRAL; Future    Ehler's-Danlos syndrome  -     UROLOGY ADULT REFERRAL    No prolapse  Has levator myalgia which explains her dyspareunia and low abdominal cramping      Referral for pelvic PT placed. Lives in [place] , so ext referral     I spent a total of 45 minutes with  Anjelica Holloway  on the date of the encounter in chart review, face to face patient visit, review of tests, documentation and/or discussion with other providers about the issues documented above.     Elvira Mercado MD, [facility]  , Department of OBGYN  Female Pelvic Medicine and Reconstructive Surgery ( Urogynecology)  CC  Patient Care Team:  Zackery Bowen MD as PCP - Teresa Zee, RN as Registered Nurse (Perinatology)  Thu eHnriquez RN as Registered Nurse  Han Hester RN as Nurse  Coordinator (Vascular Medicine)  Davion Gutierrez MD as MD (Vascular Medicine)  Davion Gutierrez MD as Assigned Heart and Vascular Provider  DAVION GUTIERREZ

## 2021-04-20 ENCOUNTER — MYC MEDICAL ADVICE (OUTPATIENT)
Dept: CARDIOLOGY | Facility: CLINIC | Age: 26
End: 2021-04-20

## 2021-04-20 DIAGNOSIS — I73.9 PAD (PERIPHERAL ARTERY DISEASE) (H): ICD-10-CM

## 2021-04-20 DIAGNOSIS — I99.9 VASCULAR ABNORMALITY: ICD-10-CM

## 2021-04-20 NOTE — TELEPHONE ENCOUNTER
PRIOR AUTHORIZATION DENIED    Medication: sildenafil (REVATIO) 20 MG tablet--DENIED    Denial Date: 4/19/2021    Denial Rational: Denied for diagnosis.  Medication is covered for pulmonary arterial hypertension (PAH)      Appeal Information:

## 2021-04-20 NOTE — TELEPHONE ENCOUNTER
Medication Appeal Initiation    We have initiated an appeal for the requested medication:  Medication: sildenafil (REVATIO) 20 MG tablet--DENIED  Appeal Start Date:  4/20/2021  Insurance Company: [employer] - Phone [phone] Fax [phone]  Comments:

## 2021-04-21 ENCOUNTER — TELEPHONE (OUTPATIENT)
Dept: CARDIOLOGY | Facility: CLINIC | Age: 26
End: 2021-04-21

## 2021-04-21 NOTE — TELEPHONE ENCOUNTER
-Pharmacist looking for Urgent appeal letter for revatio-  I see note in from 4-, will message Team 4 nurses to call pharmacist back ASAP    [phone]    kira cedeno

## 2021-04-22 NOTE — TELEPHONE ENCOUNTER
Contacted [facility] to follow up on appeal request.  Per rep they did not received the request.  Letter was refaxed to [facility] appeals.

## 2021-04-22 NOTE — TELEPHONE ENCOUNTER
Spoke with , pharmacist with [facility], who was inquiring about further information regarding patient's appeal. Answered 's questions to the best of RNs ability regarding patient's diagnoses and cardiac history.  states that since it is an urgent appeal, a decision will be reached today.  will call back with any further questions.

## 2021-04-26 RX ORDER — SILDENAFIL CITRATE 20 MG/1
TABLET ORAL
Qty: 270 TABLET | Refills: 3 | Status: ON HOLD | OUTPATIENT
Start: 2021-04-26 | End: 2022-05-16

## 2021-04-26 NOTE — TELEPHONE ENCOUNTER
MEDICATION APPEAL DENIED    Medication: sildenafil (REVATIO) 20 MG tablet--APPEAL DENIED    Denial Date: 4/23/2021    Denial Rational: Received phone call from [facility] appeals.  Appeal denied for diagnosis.  Diagnosis is an off label use.    Second Level Appeal Information: [facility]     Second level appeals will be managed by the clinic staff and provider. Please contact the [facility] Prior Authorization Team if additional information about the denial is needed.

## 2021-04-26 NOTE — PROGRESS NOTES
RN called [facility] and she advised this RN that she would review with her manager what the next steps would be as she is not seeing a second level appeal process for this particular patient. RN provided [facility] rep with this RN's phone number. Will await callback for next steps.     [facility] member ID#[id number]      MEDICATION APPEAL DENIED     Medication: sildenafil (REVATIO) 20 MG tablet--APPEAL DENIED     Denial Date: 4/23/2021     Denial Rational: Received phone call from [facility].  Appeal denied for diagnosis.  Diagnosis is an off label use.     Second Level Appeal Information: [facility]      Second level appeals will be managed by the clinic staff and provider. Please contact the [facility] Prior Authorization Team if additional information about the denial is needed.

## 2021-05-27 NOTE — PROGRESS NOTES
RN called [facility] and updated them that a dx of Raynauds is appropriate for this patient per Dr. Gutierrez. Agent in pharmacy advised this RN she will resubmit the rx with the attached dx of raynauds and will let us know later today or tomorrow if approved via phone. RN provided pharmacy agent with direct phone number to callback.       Dr. Gutierrez's recommendations      Yes secondary Raynauds

## 2021-06-21 ENCOUNTER — TELEPHONE (OUTPATIENT)
Dept: CARDIOLOGY | Facility: CLINIC | Age: 26
End: 2021-06-21

## 2021-06-21 ENCOUNTER — MYC MEDICAL ADVICE (OUTPATIENT)
Dept: CARDIOLOGY | Facility: CLINIC | Age: 26
End: 2021-06-21

## 2021-06-21 NOTE — TELEPHONE ENCOUNTER
VM received from patient, requesting a call back. Spoke with patient. Patient states that she went out of town recently with her family, and lost her prescription bag at the hotel. Patient is taking hydrocodone-acetaminophen for chronic pain, and her PCP told her they can't fill it until 7/1, so she is out of medication until then. Patient is wondering if Dr. Gutierrez would be willing to prescribe her a temporary supply. Reviewed with patient that as cardiology clinic, we don't fill narcotic prescriptions. Also informed patient that it is likely her insurance company that will not allow the fill until then. Will route to Dr. Gutierrez for review.

## 2021-06-23 NOTE — TELEPHONE ENCOUNTER
Reviewed verbally with Dr. Gutierrez. Dr. Gutierrez is not able to prescribe patient's narcotics due to policy. Dr. Gutierrez offered referral to pain clinic if patient is interested. Will respond to patient via [employer].

## 2021-07-19 ENCOUNTER — VIRTUAL VISIT (OUTPATIENT)
Dept: UROLOGY | Facility: CLINIC | Age: 26
End: 2021-07-19
Attending: OBSTETRICS & GYNECOLOGY
Payer: COMMERCIAL

## 2021-07-19 DIAGNOSIS — R10.2 PELVIC PAIN IN FEMALE: Primary | ICD-10-CM

## 2021-07-19 PROCEDURE — 99207 PR NON-BILLABLE SERV PER CHARTING: CPT | Performed by: OBSTETRICS & GYNECOLOGY

## 2021-07-19 NOTE — LETTER
2021       RE: Anjelica Holloway   [address]  [facility] [address]     Dear Colleague,    Thank you for referring your patient, Anjelica Holloway, to the [facility] [place] at [facility]. Please see a copy of my visit note below.    Patient not available for scheduled telephone follow up . Chart reviewed as below .         HPI:  Anjelica Holloway is a 26 year old female being followed for her dyspareunia/pelvic pain. .  I last saw her on 21 and noted levator myalgia and elevated pelvic floor tone. I referred her for pelvic PT at that time.     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     Sexual function/Pelvic floor pain/GYN:   Has pain with intercourse since her second delivery ( ) with both entry and deep penetration. Not improved by body position. Also has some menstrual cramps. On Aviane for OCP    Urinary Symptoms/Voiding function  Denies bothersome stress or urgency leakage. Denies urinary urgency, frequency, nocturia. Denies hx of recurrent UTI or gross hematuria. Used to have recurrent UTI several years ago but resolved    Pelvic Organ Prolapse Symptoms  Denies vaginal bulge or pressure sensation.    Gastrointestinal Symptoms:  Denies chronic diarrhea, constipation. Denies bothersome fecal or flatal incontinence.       Pelvic Exam:   Normal external female genitalia. The urethra was normal.    Vagina: well supported. Diffuse levator myalgia in iliococcygeus and obturator internus muscles, no abnormal discharge  Uterus: normal size, non tender. Normal cervix  Ovaries: non  palpable, no masses  Vulva: no vestibular pain, no lesion  Rectal: deferred    Pelvic floor strength: 2/5 kegels.    Pelvic floor muscles: Baseline elevater muscle tone, poor relaxation, Diffuse levator myalgia in iliococcygeus and obturator internus muscles    POPQ EXAM FOR PROLAPSE SEVERITY  No prolapse    Voiding trial:  None done           Elvira Mercado MD

## 2021-07-19 NOTE — PROGRESS NOTES
Patient not available for scheduled telephone follow up . Chart reviewed as below .         HPI:  Anjelica Holloway is a 26 year old female being followed for her dyspareunia/pelvic pain. .  I last saw her on 21 and noted levator myalgia and elevated pelvic floor tone. I referred her for pelvic PT at that time.     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     Sexual function/Pelvic floor pain/GYN:   Has pain with intercourse since her second delivery ( ) with both entry and deep penetration. Not improved by body position. Also has some menstrual cramps. On Aviane for OCP    Urinary Symptoms/Voiding function  Denies bothersome stress or urgency leakage. Denies urinary urgency, frequency, nocturia. Denies hx of recurrent UTI or gross hematuria. Used to have recurrent UTI several years ago but resolved    Pelvic Organ Prolapse Symptoms  Denies vaginal bulge or pressure sensation.    Gastrointestinal Symptoms:  Denies chronic diarrhea, constipation. Denies bothersome fecal or flatal incontinence.       Pelvic Exam:   Normal external female genitalia. The urethra was normal.    Vagina: well supported. Diffuse levator myalgia in iliococcygeus and obturator internus muscles, no abnormal discharge  Uterus: normal size, non tender. Normal cervix  Ovaries: non palpable, no masses  Vulva: no vestibular pain, no lesion  Rectal: deferred    Pelvic floor strength: 2/5 kegels.    Pelvic floor muscles: Baseline elevater muscle tone, poor relaxation, Diffuse levator myalgia in iliococcygeus and obturator internus muscles    POPQ EXAM FOR PROLAPSE SEVERITY  No prolapse    Voiding  trial:  None done

## 2021-09-25 ENCOUNTER — HEALTH MAINTENANCE LETTER (OUTPATIENT)
Age: 26
End: 2021-09-25

## 2021-12-07 ENCOUNTER — MYC MEDICAL ADVICE (OUTPATIENT)
Dept: CARDIOLOGY | Facility: CLINIC | Age: 26
End: 2021-12-07
Payer: COMMERCIAL

## 2021-12-07 DIAGNOSIS — Z32.01 PREGNANCY TEST POSITIVE: ICD-10-CM

## 2021-12-07 DIAGNOSIS — Q79.60 EHLERS-DANLOS SYNDROME: Primary | ICD-10-CM

## 2021-12-07 NOTE — TELEPHONE ENCOUNTER
[employer] message received. RN will update patient via [employer] regarding being due for follow up now. RN will send to Dr. Gutierrez to inquire if we should be sending referral for maternal or if should be PMD.         Hi , I m just reaching out to see if you would like me to schedule an appointment with you anytime soon, I remember last time we saw each other you had mentioned making an appointment this winter to check on how my legs/feet are doing. Also just recently found out I am pregnant I get seen up at the [facility] maternal fetal medicine and they need a referral for me to schedule an appointment with them so I was wondering if you or someone from your team could fax them a referral over! If you have any questions feel free to give me a call my cell phone is [phone].     Thanks karen Holloway

## 2021-12-08 NOTE — TELEPHONE ENCOUNTER
Lizz Gutierrez MD  [facility] Heart Team 4 49 minutes ago (3:47 PM)     CF    Hello! Please let Anjelica know that yes I'd love to see her especially during pregnancy. She can be placed in a first available in [place] or virtual when in [facility] or [place]. Referral to go to [facility] is fine as well from us.     Dr. Gutierrez      Patient updated via [employer]. Will need estimated delivery date for M referral. So will wait for patients response prior to placing referral.

## 2021-12-15 DIAGNOSIS — I73.9 PAD (PERIPHERAL ARTERY DISEASE) (H): ICD-10-CM

## 2021-12-15 DIAGNOSIS — O09.90 HIGH-RISK PREGNANCY, UNSPECIFIED TRIMESTER: ICD-10-CM

## 2021-12-15 DIAGNOSIS — Q79.60 EHLERS-DANLOS SYNDROME: Primary | ICD-10-CM

## 2021-12-17 ENCOUNTER — TRANSCRIBE ORDERS (OUTPATIENT)
Dept: MATERNAL FETAL MEDICINE | Facility: CLINIC | Age: 26
End: 2021-12-17
Payer: COMMERCIAL

## 2021-12-17 DIAGNOSIS — O26.90 PREGNANCY RELATED CONDITION, ANTEPARTUM: Primary | ICD-10-CM

## 2021-12-31 ENCOUNTER — PRE VISIT (OUTPATIENT)
Dept: MATERNAL FETAL MEDICINE | Facility: CLINIC | Age: 26
End: 2021-12-31
Payer: COMMERCIAL

## 2021-12-31 PROBLEM — Z98.891 S/P C-SECTION: Status: RESOLVED | Noted: 2019-07-01 | Resolved: 2021-12-31

## 2022-01-06 ENCOUNTER — HOSPITAL ENCOUNTER (OUTPATIENT)
Dept: ULTRASOUND IMAGING | Facility: CLINIC | Age: 27
End: 2022-01-06
Attending: OBSTETRICS & GYNECOLOGY
Payer: COMMERCIAL

## 2022-01-06 ENCOUNTER — OFFICE VISIT (OUTPATIENT)
Dept: MATERNAL FETAL MEDICINE | Facility: CLINIC | Age: 27
End: 2022-01-06
Attending: INTERNAL MEDICINE
Payer: COMMERCIAL

## 2022-01-06 VITALS — BODY MASS INDEX: 20.31 KG/M2 | SYSTOLIC BLOOD PRESSURE: 107 MMHG | WEIGHT: 129.7 LBS | DIASTOLIC BLOOD PRESSURE: 52 MMHG

## 2022-01-06 DIAGNOSIS — Q79.60 EHLERS-DANLOS SYNDROME: ICD-10-CM

## 2022-01-06 DIAGNOSIS — O09.90 HIGH-RISK PREGNANCY, UNSPECIFIED TRIMESTER: Primary | ICD-10-CM

## 2022-01-06 DIAGNOSIS — Q79.62 EHLERS-DANLOS SYNDROME TYPE III: ICD-10-CM

## 2022-01-06 DIAGNOSIS — O09.90 HIGH-RISK PREGNANCY, UNSPECIFIED TRIMESTER: ICD-10-CM

## 2022-01-06 DIAGNOSIS — I73.9 PAD (PERIPHERAL ARTERY DISEASE) (H): ICD-10-CM

## 2022-01-06 LAB
ABO/RH(D): NORMAL
ALBUMIN UR-MCNC: NEGATIVE MG/DL
AMORPH CRY #/AREA URNS HPF: ABNORMAL /HPF
ANTIBODY SCREEN: NEGATIVE
APPEARANCE UR: ABNORMAL
BASOPHILS # BLD AUTO: 0.1 10E3/UL (ref 0–0.2)
BASOPHILS NFR BLD AUTO: 0 %
BILIRUB UR QL STRIP: NEGATIVE
COLOR UR AUTO: ABNORMAL
EOSINOPHIL # BLD AUTO: 0.1 10E3/UL (ref 0–0.7)
EOSINOPHIL NFR BLD AUTO: 1 %
ERYTHROCYTE [DISTWIDTH] IN BLOOD BY AUTOMATED COUNT: 12.3 % (ref 10–15)
GLUCOSE UR STRIP-MCNC: NEGATIVE MG/DL
HCT VFR BLD AUTO: 34.1 % (ref 35–47)
HGB BLD-MCNC: 11.5 G/DL (ref 11.7–15.7)
HGB UR QL STRIP: NEGATIVE
IMM GRANULOCYTES # BLD: 0.1 10E3/UL
IMM GRANULOCYTES NFR BLD: 1 %
KETONES UR STRIP-MCNC: NEGATIVE MG/DL
LEUKOCYTE ESTERASE UR QL STRIP: NEGATIVE
LYMPHOCYTES # BLD AUTO: 2.1 10E3/UL (ref 0.8–5.3)
LYMPHOCYTES NFR BLD AUTO: 17 %
MCH RBC QN AUTO: 32.1 PG (ref 26.5–33)
MCHC RBC AUTO-ENTMCNC: 33.7 G/DL (ref 31.5–36.5)
MCV RBC AUTO: 95 FL (ref 78–100)
MONOCYTES # BLD AUTO: 0.5 10E3/UL (ref 0–1.3)
MONOCYTES NFR BLD AUTO: 4 %
MUCOUS THREADS #/AREA URNS LPF: PRESENT /LPF
NEUTROPHILS # BLD AUTO: 9.6 10E3/UL (ref 1.6–8.3)
NEUTROPHILS NFR BLD AUTO: 77 %
NITRATE UR QL: NEGATIVE
NRBC # BLD AUTO: 0 10E3/UL
NRBC BLD AUTO-RTO: 0 /100
PH UR STRIP: 7 [PH] (ref 5–7)
PLATELET # BLD AUTO: 279 10E3/UL (ref 150–450)
RBC # BLD AUTO: 3.58 10E6/UL (ref 3.8–5.2)
RBC URINE: 0 /HPF
SP GR UR STRIP: 1.02 (ref 1–1.03)
SPECIMEN EXPIRATION DATE: NORMAL
SQUAMOUS EPITHELIAL: <1 /HPF
UROBILINOGEN UR STRIP-MCNC: NORMAL MG/DL
WBC # BLD AUTO: 12.5 10E3/UL (ref 4–11)
WBC URINE: 0 /HPF

## 2022-01-06 PROCEDURE — 85025 COMPLETE CBC W/AUTO DIFF WBC: CPT | Performed by: OBSTETRICS & GYNECOLOGY

## 2022-01-06 PROCEDURE — 76811 OB US DETAILED SNGL FETUS: CPT

## 2022-01-06 PROCEDURE — 87389 HIV-1 AG W/HIV-1&-2 AB AG IA: CPT | Performed by: OBSTETRICS & GYNECOLOGY

## 2022-01-06 PROCEDURE — 87591 N.GONORRHOEAE DNA AMP PROB: CPT | Performed by: OBSTETRICS & GYNECOLOGY

## 2022-01-06 PROCEDURE — 81001 URINALYSIS AUTO W/SCOPE: CPT | Performed by: OBSTETRICS & GYNECOLOGY

## 2022-01-06 PROCEDURE — 87340 HEPATITIS B SURFACE AG IA: CPT | Performed by: OBSTETRICS & GYNECOLOGY

## 2022-01-06 PROCEDURE — 76811 OB US DETAILED SNGL FETUS: CPT | Mod: 26 | Performed by: OBSTETRICS & GYNECOLOGY

## 2022-01-06 PROCEDURE — 87086 URINE CULTURE/COLONY COUNT: CPT | Performed by: OBSTETRICS & GYNECOLOGY

## 2022-01-06 PROCEDURE — 86901 BLOOD TYPING SEROLOGIC RH(D): CPT | Performed by: OBSTETRICS & GYNECOLOGY

## 2022-01-06 PROCEDURE — 99214 OFFICE O/P EST MOD 30 MIN: CPT | Mod: 25 | Performed by: OBSTETRICS & GYNECOLOGY

## 2022-01-06 PROCEDURE — 36415 COLL VENOUS BLD VENIPUNCTURE: CPT | Performed by: OBSTETRICS & GYNECOLOGY

## 2022-01-06 PROCEDURE — 86762 RUBELLA ANTIBODY: CPT | Performed by: OBSTETRICS & GYNECOLOGY

## 2022-01-06 PROCEDURE — 86780 TREPONEMA PALLIDUM: CPT | Performed by: OBSTETRICS & GYNECOLOGY

## 2022-01-06 PROCEDURE — G0463 HOSPITAL OUTPT CLINIC VISIT: HCPCS | Mod: 25

## 2022-01-06 PROCEDURE — 87491 CHLMYD TRACH DNA AMP PROBE: CPT | Performed by: OBSTETRICS & GYNECOLOGY

## 2022-01-06 NOTE — PROGRESS NOTES
Pt presents to [facility] for assessment and evaluation of her pregnancy due to EDS. Pt states she has been doing well. Offers no complaints at this time. Pt states she has reached out to [facility] and is waiting to hear back from them for an apt. Pt had us done today and reviewed by Dr. Phillips. See epic for today's findings. Pt had first obv done. Was given new pt folder and PCC card to call if she has further questions or concerns. Pt was given flu shot today without issues. Will get her covid booster. Pt will return in 4 weeks for Rl2 and Obv. Questions answered today. Will go to the lab for her new ob labs. Discharged stable at this time. Teresa Gallardo RN

## 2022-01-06 NOTE — PROGRESS NOTES
"Maternal-Fetal Medicine   First OB Visit    Anjelica Holloway  : 1995  MRN: [id number]    Anjelica Holloway is a transfer of care from [facility] cardiology clinic by Dr. Gutierrez     HPI:  Anjelica Holloway is a 26 year old  at 17w6d by LMP consistent with 9w4d US here for new OB visit. She is here with her daughter.     She is feeling well. No complaints or changes in her history since her last pregnancy. She is due to see Dr. Gutierrez for her routine follow up of her EDS. She has no symptoms including no LE edema. Denies contractions, LOF, vaginal bleeding.    She has questions about her previous  section and implications for this pregnancy. She has a history of 3 prior CS with the most recent in 2019. This was a high transverse  section via Pfannenstiel incision. Findings in the operative report: \"Dense rectofascial adhesions. Dense adhesions of the upper 1/3 of the anterior uterus to the anterior abdominal wall. Defect in the anterior uterus made with lysis of adhesions. Lower uterine segment unable to be identified due to adhesive disease, high transverse hysterotomy made in the active segment of the uterus. Recommend future deliveries between 36-37 weeks. Advised against subsequent pregnancy due to level of scarring.\" She is understandably concerned about this. She says that this is her last pregnancy and she is planning a tubal ligation.     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  - anxiety    Obstetrics History:  OB History    Para Term  AB Living   4 3 3 0 0 3   SAB IAB Ectopic Multiple Live " Births   0 0 0 0 3      # Outcome Date GA Lbr Lewis/2nd Weight Sex Delivery Anes PTL Lv   4 Current            3 Term 19 38w0d  3.26 kg (7 lb 3 oz) M CS-LTranv  N KEYON      Name: RAHAT SMITH-SIDNEY      Apgar1: 4  Apgar5: 7   2 Term 18 38w0d  3.49 kg (7 lb 11.1 oz) F CS-LTranv Spinal N KEYON      Name: SAVANNA SMITH1 SIDNEY      Apgar1: 9  Apgar5: 9   1 Term 05/20/15 38w0d  3.204 kg (7 lb 1 oz) F CS-LTranv  N KEYON      Apgar1: 9  Apgar5: 9       Gynecologic History:  - Patient's last menstrual period was 2021.  - Last Pap: 2018 NILM, HPV negative  - Denies any history of abnormal pap smears  - Denies prior cervical surgery or procedures  - Denies any history of frequent UTIs, vaginal infections, or STIs    Past Medical History:  Past Medical History:   Diagnosis Date     ADHD (attention deficit hyperactivity disorder)      Anxiety      Ehler's-Danlos syndrome 2013     Problem list name updated by automated process. Provider to review and confirm     Headaches      Joint hyperextensibility of multiple sites     suspect Ehler-Danlos but genetics testing not conclusive      Meckel's diverticulum     rupture of Meckel's diverticulum s/p bowel resection, approx 4 inches removed     PAD (peripheral artery disease) (H) 2012    Non-atherosclerotic PAD due to unknown arterial occlusive disease, presumed Bill-Danlos syndrome, with superimposed vasospasm      Peptic ulcer disease     secondary to prolonged NSAIDs use for headache     POTS (postural orthostatic tachycardia syndrome)      S/P  2019     Vascular abnormality 2013     Vascular anomalies, 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, and hypoplastic right posterior tibular arteries       Past Surgical History:  Past Surgical History:   Procedure Laterality Date     ------------OTHER-------------  rupture Meckel diverticulum with removal of necroic bowel    Removal of       APPENDECTOMY        SECTION N/A 2015    Procedure:  SECTION;  Surgeon: Zulma Chavez MD;  Location: [facility]      SECTION N/A 2018    Procedure:  SECTION;  Repeat  Section ;  Surgeon: Jayne Smith MD;  Location: [facility]      SECTION N/A 2019    Procedure: Repeat  Section;  Surgeon: Little Fishman MD;  Location: [facility] L[facility]     cesearan section      x2 2018     CHOLECYSTECTOMY       OTHER SURGICAL HISTORY      Bowel resection for ruptured meckel's diverticulum       Current Medications:  Prior to Admission medications    Medication Sig Last Dose Taking? Auth Provider   amLODIPine (NORVASC) 5 MG tablet Take 1 tablet (5 mg) by mouth 2 times daily   Lizz Gutierrez MD   HYDROcodone-acetaminophen (NORCO) 5-325 MG tablet TAKE ONE OR TWO TABLETS BY MOUTH EVERY SIX HOURS AS NEEDED FOR PAIN   Reported, Patient   levonorgestrel-ethinyl estradiol (AVIANE) 0.1-20 MG-MCG tablet Take 1 tablet by mouth daily   Reported, Patient   methylphenidate (RITALIN LA) 10 MG CP24 Take 30 mg by mouth 2 tabs of 10mg in A.M and 1  10mg in P.M   Reported, Patient   PARoxetine (PAXIL) 10 MG tablet Take 1 tablet (10 mg) by mouth every morning   Rita Storm MD   sildenafil (REVATIO) 20 MG tablet Take 40 mg in the am, 20 mg in the afternoon and pm (three times total in a day)   Yvonne Leon APRN CNP     Allergies:  Ibuprofen and Nsaids    Social History:   Status:   Denies use of alcohol, drugs.    Family History:  Denies history of genetic disorders, preeeclampsia, thromboembolic disease, bleeding disorders, mental retardation    ROS:  10-point ROS negative except as in HPI     PHYSICAL EXAM:  /52 (BP Location: Left arm, Patient Position: Sitting, Cuff Size: Adult Regular)   Wt 58.8 kg (129 lb 11.2 oz)   LMP 2021   BMI 20.31 kg/m      Gen: NAD, well appearing  CV: regular rate and rhythm, no murmurs  Chest:  Non-labored breathing, CTAB  Abdomen: gravid, non-tender, non-distended  Extremities: WWP, no edema    Prenatal Labs:    Will obtain today    From previous labs:  ABO/Rh: A pos  ABS: negative   Hgb: 12.1  HCT: 35.9  MCV: 95  Plt: 222     Genetic Testing:   none    Ultrasounds:   DATE  GA  ASSESSMENT  21  9w4d  Dating  22 (today) 17w6d  Normal anatomy today      ASSESSMENT/PLAN:  Anjelica Holloway is a 26 year old  at 17w6d by LMP c/w 9w4d US here for new OB visit     Pregnancy complicated by:   - vascular Bill Danlos syndrome   Bilateral lower extremity congenital vascular malformation   negative genetic testing  - peripheral artery disease  - history of CS x3 (2015, 2018, 2019)   most recent was high transverse   dense adhesions   Recommended against pregnancy, delivery at 36-37w  - chronic pain  - FLNA gene mutation, unknown significance, 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 Gutierrez  - plan for serial growth ultrasounds this pregnancy    # history of  section x3  # adhesive disease  Discussed her previous operative findings and that due to the high transverse uterine incision, delivery is recommended at 36-37 weeks. Discussed adhesions and that she may require a vertical midline skin incision, or possibly a classical/high transverse CS. She is planning a tubal ligation.    # chronic pain  Not on any medications    # anxiety  Currently on paxil, symptoms currently stable    # routine prenatal care  - new OB labs to be collected today  - COVID vaccine: due for booster, encouraged her to schedule this soon  - flu vaccine today  - normal anatomy ultrasound today  - planning tubal ligation. Discussed risk of not being able to perform this due to adhesions, she would  like to plan for this if possible. Plan to sign federal tubal papers at next visit.     Return to clinic in 4 weeks  Patient seen and staffed with Dr. Jacqueline Moore MD  OB/GYN PGY-2  01/06/2022 3:04 PM     Total time spent in all patient care activities on the day of this visit was 30 minutes.     Jg Phillips MD  Maternal-Fetal Medicine

## 2022-01-07 LAB
BACTERIA UR CULT: NO GROWTH
C TRACH DNA SPEC QL NAA+PROBE: NEGATIVE
HBV SURFACE AG SERPL QL IA: NONREACTIVE
HIV 1+2 AB+HIV1 P24 AG SERPL QL IA: NONREACTIVE
N GONORRHOEA DNA SPEC QL NAA+PROBE: NEGATIVE
RUBV IGG SERPL QL IA: 0.85 INDEX
RUBV IGG SERPL QL IA: NORMAL
T PALLIDUM AB SER QL: NONREACTIVE

## 2022-01-15 ENCOUNTER — HEALTH MAINTENANCE LETTER (OUTPATIENT)
Age: 27
End: 2022-01-15

## 2022-01-27 ENCOUNTER — TELEPHONE (OUTPATIENT)
Dept: MATERNAL FETAL MEDICINE | Facility: CLINIC | Age: 27
End: 2022-01-27
Payer: COMMERCIAL

## 2022-01-27 NOTE — TELEPHONE ENCOUNTER
Phone call to Anjelica regarding MFM appt on 2/8 and changing time to begin at 0930 US and 1015 Ob visit. Pt agreed to change. Pt also states she is inpatient in [place] at [place] being worked up for a kidney stone and obstruction of ureter. Will notify ELMER LOPEZ of patient status.       Thu Rdz RN

## 2022-02-05 NOTE — PROGRESS NOTES
Maternal-Fetal Medicine Return OB Visit    HPI:  Anjelica Holloway is a 26 year old  at 22w4d by LMP consistent with 9w4d US here for return OB visit.     She is feeling well and has no obstetric complaints. Denies chest pain, dyspnea. Denies contractions, LOF, vaginal bleeding. Endorses normal FM.     Admitted to [place] with kidney stones on -. She received IVF, pain medication, and serial imaging. She was found to have right hydronephrosis and hydroureter. She has another appointment at end of February to see if the hydroureteronephrosis has resolved, and if not, to talk about interventions. Still having intermittent pain, but mainly at the end of the day when she is sitting resting. She has been taking Flomax daily and has been taking oxycodone intermittently. She denies fevers, chills, dysuria, hematuria. She has been straining her urine daily, but has not passed any more stones since being admitted to the hospital. She is not sure if stones were sent for analysis. She was not asked to implement any dietary changes since being diagnosed with the stones.     PHYSICAL EXAM:  /71 (BP Location: Left arm, Patient Position: Sitting, Cuff Size: Adult Regular)   Pulse 91   Wt 62.3 kg (137 lb 4.8 oz)   LMP 2021   SpO2 100%   BMI 21.50 kg/m       Gen: NAD, well appearing  Posterior thorax: No CVA tenderness    Ultrasounds:   DATE  GA  ASSESSMENT  21  9w4d  Dating  22   17w6d  Normal anatomy   22  22w4d  Normal growth      ASSESSMENT/PLAN:  Anjelica Holloway is a 26 year old  at 22w4d by LMP c/w 9w4d US here for return OB visit     Nephrolithiasis with right hydroureteronephrosis  - Will check Cr today   -  Per documentation urology plans to see her in ~ 4 weeks to see if she is improved or if intervention is warranted.  Per their note stents q 3-4 weeks versus percutaneous nephrostomy tubes were previously discussed with the patient. Will plan to reach out to [place] urology  group to discuss patient and possible interventions regarding to kidney stones. Will then call patient to discuss further. In general, we reviewed that these interventions can be performed in pregnancy, with best safety profile in the second trimester.    - No evidence of pyelonephritis/or UTI at this time  - Recommend continued Flomax, adequate PO hydration, continuing to strain urine.  - Will plan to call patient after discussing with urology. Precautions reviewed with patient.     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 Gutierrez    History of  section x3  Adhesive disease  - Previously discussed her previous operative findings and that due to the high transverse uterine incision, delivery is recommended at 36-37 weeks. Discussed adhesions and that she may require a vertical midline skin incision, or possibly a classical/high transverse CS. She is planning a tubal ligation, if possible, at the time of surgery.  - Will plan to discuss late  steroids if delivery in 36th week, without contraindications    Chronic pain  - Not currently on any medications for chronic pain    Anxiety  - Currently on paxil, symptoms currently stable     surveillance  - plan for serial growth ultrasounds  - Last growth today,  g (62%)    Routine prenatal care  - Rh positive  - Vaccines: s/p flu vaccines. COVID booster advised and patient plans to obtain today.   - GCT at next visit  - planning tubal ligation. Discussed risk of not being able to perform this due to adhesions, she would like to plan for this, if possible. Planned to sign FTP today, though she states that her  wants more children. She understands that pregnancy is not recommended, though wanted her   to be present at the next meeting so that her can hear risks of a future pregnancy. Plan to sign FTP at next visit.      RTC in 4 weeks with OBV and growth.     Seen and discussed with Dr. Yoder.    Melida Mejia MD  Maternal-Fetal Medicine Fellow    [facility] Attending Attestation  I personally evaluated Anjelica E Jewel with Dr. Mejia and agree with Dr. Mejia's findings, assessment and plan of care as documented in the above note. The plan of care was formulated by me.  I personally reviewed vital signs, laboratory results, imaging and orders. See note for details; I have avila the necessary edits/additions.    Thu Yoder MD  , OB/GYN  Maternal-Fetal Medicine  [email or web address]  [phone] (Main [facility] Office)  [id number] or [phone] (for 24 hour [facility] questions)  [phone] (Pager)      Time Spent on this Encounter   I spent a total 25 minutes on the encounter with Anjelica E Jewel today   More than 50% of my time was spent on counseling and/or coordination of care   - Counseling the patient and/or family regarding: diagnosis, diagnostic results, prognosis and risks and benefits of management options  - Coordination of care with the: nurse and patient    Date of service (when I saw the patient): February 8, 2022

## 2022-02-08 ENCOUNTER — OFFICE VISIT (OUTPATIENT)
Dept: MATERNAL FETAL MEDICINE | Facility: CLINIC | Age: 27
End: 2022-02-08
Attending: OBSTETRICS & GYNECOLOGY
Payer: COMMERCIAL

## 2022-02-08 ENCOUNTER — HOSPITAL ENCOUNTER (OUTPATIENT)
Dept: ULTRASOUND IMAGING | Facility: CLINIC | Age: 27
End: 2022-02-08
Attending: OBSTETRICS & GYNECOLOGY
Payer: COMMERCIAL

## 2022-02-08 ENCOUNTER — LAB (OUTPATIENT)
Dept: LAB | Facility: CLINIC | Age: 27
End: 2022-02-08
Attending: OBSTETRICS & GYNECOLOGY
Payer: COMMERCIAL

## 2022-02-08 VITALS
BODY MASS INDEX: 21.5 KG/M2 | HEART RATE: 91 BPM | OXYGEN SATURATION: 100 % | DIASTOLIC BLOOD PRESSURE: 71 MMHG | SYSTOLIC BLOOD PRESSURE: 102 MMHG | WEIGHT: 137.3 LBS

## 2022-02-08 DIAGNOSIS — N20.0 NEPHROLITHIASIS: ICD-10-CM

## 2022-02-08 DIAGNOSIS — O09.90 HIGH-RISK PREGNANCY, UNSPECIFIED TRIMESTER: ICD-10-CM

## 2022-02-08 DIAGNOSIS — Q79.62 EHLERS-DANLOS SYNDROME TYPE III: Primary | ICD-10-CM

## 2022-02-08 DIAGNOSIS — Q79.62 EHLERS-DANLOS SYNDROME TYPE III: ICD-10-CM

## 2022-02-08 LAB
CREAT SERPL-MCNC: 0.36 MG/DL (ref 0.52–1.04)
GFR SERPL CREATININE-BSD FRML MDRD: >90 ML/MIN/1.73M2

## 2022-02-08 PROCEDURE — 76816 OB US FOLLOW-UP PER FETUS: CPT | Mod: 26 | Performed by: OBSTETRICS & GYNECOLOGY

## 2022-02-08 PROCEDURE — 76816 OB US FOLLOW-UP PER FETUS: CPT

## 2022-02-08 PROCEDURE — G0463 HOSPITAL OUTPT CLINIC VISIT: HCPCS | Mod: 25

## 2022-02-08 PROCEDURE — 99213 OFFICE O/P EST LOW 20 MIN: CPT | Mod: 25 | Performed by: OBSTETRICS & GYNECOLOGY

## 2022-02-08 PROCEDURE — 36415 COLL VENOUS BLD VENIPUNCTURE: CPT

## 2022-02-08 PROCEDURE — 82565 ASSAY OF CREATININE: CPT

## 2022-02-08 NOTE — PROGRESS NOTES
Pt presents to [facility] for assessment and evaluation of her pregnancy due to hx of maternal eds. Pt states she is doing well except for bout of kidney stones that she was seen at [facility]. Pt states she has a follow up Feb 28 th to discuss further plan of care. Pt states +fm, no lof or bleeding. No contractions. Us done and reviewed by Dr. Yoder. See epic for today's findings. Pt had obv done. Was seen by Dr. Mejia and Dr. Yoder. See notes and flow sheets. Pt states she will get her covid booster will go to pharmacy today. Pt states she knows when to come in or call with further questions or complaints. Will return in 4 weeks for Rl2 an obv. Will do her gct at that time. Discharged stable at this time. Teresa Gallardo RN

## 2022-02-11 ENCOUNTER — TELEPHONE (OUTPATIENT)
Dept: MATERNAL FETAL MEDICINE | Facility: CLINIC | Age: 27
End: 2022-02-11
Payer: COMMERCIAL

## 2022-02-11 NOTE — TELEPHONE ENCOUNTER
2/11/2022     Called patient to inform her that I am still trying to reach her urology providers, but have not been able to do so. Will attempt again early next week and give her a call. There was no answer but discreet voicemail was left for the patient.    Melida Mejia MD  Maternal-Fetal Medicine Fellow

## 2022-02-17 ENCOUNTER — TELEPHONE (OUTPATIENT)
Dept: MATERNAL FETAL MEDICINE | Facility: CLINIC | Age: 27
End: 2022-02-17
Payer: COMMERCIAL

## 2022-02-17 NOTE — TELEPHONE ENCOUNTER
2/17/2022     Spoke with patient's urology provider at [place]. Plan for repeat US and Cr on 2/25 with follow-up provider visit on 2/28. At that time, if stones persistent, will discuss treatment interventions including stents versus ureteroscopy. Will have to discuss plan of care after repeat evaluation.    Spoke with patient who is doing about the same. Is continuing to have pain, which is getting a little worse. It is still manageable. Is straining her urine and has not noted any passage of stones. Denies fevers, chills, dysuria.      Will plan to follow-up after she meets with urology. If she does not hear from us, she should reach out to our clinic. She demonstrates understanding and all questions answered at this time.     Melida Mejia MD  Maternal-Fetal Medicine Fellow

## 2022-03-08 ENCOUNTER — HOSPITAL ENCOUNTER (OUTPATIENT)
Dept: ULTRASOUND IMAGING | Facility: CLINIC | Age: 27
End: 2022-03-08
Attending: OBSTETRICS & GYNECOLOGY
Payer: COMMERCIAL

## 2022-03-08 ENCOUNTER — OFFICE VISIT (OUTPATIENT)
Dept: MATERNAL FETAL MEDICINE | Facility: CLINIC | Age: 27
End: 2022-03-08
Attending: OBSTETRICS & GYNECOLOGY
Payer: COMMERCIAL

## 2022-03-08 VITALS
HEART RATE: 94 BPM | WEIGHT: 150.2 LBS | OXYGEN SATURATION: 98 % | RESPIRATION RATE: 20 BRPM | DIASTOLIC BLOOD PRESSURE: 52 MMHG | SYSTOLIC BLOOD PRESSURE: 108 MMHG | BODY MASS INDEX: 23.52 KG/M2

## 2022-03-08 DIAGNOSIS — Q79.62 EHLERS-DANLOS SYNDROME TYPE III: ICD-10-CM

## 2022-03-08 DIAGNOSIS — O09.92 SUPERVISION OF HIGH RISK PREGNANCY IN SECOND TRIMESTER: Primary | ICD-10-CM

## 2022-03-08 DIAGNOSIS — I73.9 PAD (PERIPHERAL ARTERY DISEASE) (H): ICD-10-CM

## 2022-03-08 DIAGNOSIS — N20.0 NEPHROLITHIASIS: ICD-10-CM

## 2022-03-08 PROCEDURE — G0463 HOSPITAL OUTPT CLINIC VISIT: HCPCS | Mod: 25

## 2022-03-08 PROCEDURE — 76816 OB US FOLLOW-UP PER FETUS: CPT | Mod: 26 | Performed by: OBSTETRICS & GYNECOLOGY

## 2022-03-08 PROCEDURE — 99214 OFFICE O/P EST MOD 30 MIN: CPT | Mod: 25 | Performed by: ADVANCED PRACTICE MIDWIFE

## 2022-03-08 PROCEDURE — 76816 OB US FOLLOW-UP PER FETUS: CPT

## 2022-03-08 ASSESSMENT — PAIN SCALES - GENERAL: PAINLEVEL: NO PAIN (0)

## 2022-03-08 NOTE — NURSING NOTE
Anjelica here for f/u comp/f/u obv due to preg c/b Eds type 3 and h/o C/S x 3. Pt reports +FM, denies ctx, denies SRoM, and denies vag bleeding.  Pt declined 1 hr GCT today and would like this done at [facility]. Pt was given phone number to call. Pt reports that she passed her kidney stone and her baby feels low.  Divya Angulo in to see pt Pt to get TDAP at next visit. Pt was scheduled for f/u comp and f/u obv in 3 weeks and left amb and stable. Thu Henriquez RN

## 2022-03-08 NOTE — PROGRESS NOTES
"Maternal fetal Medicine OB Follow up visit.     Sidney Holloway  : 1995  MRN: [id number]    CC: OB Follow-up    Subjective:  Sidney Holloway is a 26 year old  at 26w4d presenting for routine OB follow-up. Today, she is here with her , Yeyo, and reports feeling well. She does report more pelvic pressure with this pregnancy earlier on than her previous pregnancies. Denies regular, painful contractions, denies loss of fluid or vaginal bleeding.  Reports fetal movement.      Reports she did pass kidney stone; no longer having any symptoms from previous nephrolithiasis dx.     Today, she would like to review the risks associated with future pregnancies due to scar tissue since her  is with her today. Additionally, she would like to know if having a baby born at 36 weeks is an automatic admission to the NICU.    OB Hx:  OB History    Para Term  AB Living   4 3 3 0 0 3   SAB IAB Ectopic Multiple Live Births   0 0 0 0 3      # Outcome Date GA Lbr Lewis/2nd Weight Sex Delivery Anes PTL Lv   4 Current            3 Term 19 38w0d  3.26 kg (7 lb 3 oz) M CS-LTranv  N KEYON      Name: SARAHMALE-SIDNEY      Apgar1: 4  Apgar5: 7   2 Term 18 38w0d  3.49 kg (7 lb 11.1 oz) F CS-LTranv Spinal N KEYON      Name: [email or web address] SIDNEY      Apgar1: 9  Apgar5: 9   1 Term 05/20/15 38w0d  3.204 kg (7 lb 1 oz) F CS-LTranv  N KEYON      Apgar1: 9  Apgar5: 9         Objective:  /52   Pulse 94   Resp 20   Wt 68.1 kg (150 lb 3.2 oz)   LMP 2021   SpO2 98%   BMI 23.52 kg/m      Gen: alert, oriented, NAD  Skin: warm, dry, intact  Respiratory: breathing unlabored, no SOB  Abdominal: gravid, non-tender  Pelvic: deferred  Extremities: WNL  Psych: mood WNl, behavior WNL      OB Ultrasound:  Please see \"imaging\" tab under chart review for today's ultrasound results.      Assessment/Plan:  26 year old  at 26w4d here for follow OB visit.    Pregnancy has been complicated by:   - " Bill-Danlos type III  - Peripheral arterial disease  - Hx of c/s x3 with dense adhesions with need high transverse incision  - Chronic pain  - FLNA gene mutation, unknown significance        #EDS:  #PAD:  - Remains asymptomatic   - Negative genetic testing  - Taking sildenafil  - In the process of scheduling follow up with Dr. Gutierrez    #Routine PNC:  - Prenatal labs:  Rh: +  antibody: neg   HepB/HIV/RPR: nonreactive   GC/CT: negative   Rubella: non-immune     GCT: 24-28 weeks. Unable to stay for this today. Will place future order for patient to come to [place] lab for this to be completed in the next 2 weeks.    UC: no growth  - Immunizations:  s/p TDap, Flu, and Covid x2 - Review booster at next visit    # Surveillance:  - Serial growth US. Next in 4 weeks.    #Delivery planning:  - Repeat c/ between 36-37 weeks. Reviewed that 36 week delivery will not require automatic admission to the NICU and baby will be monitored closely after delivery, but can often remain with the family.  - Dr. Tabares in to  patient and  on risks of future pregnancies. Reviewed past  sections and significant adhesions. Discussed that likely a midline vertical incision will be necessary for delivery.   - Reviewed that with state-sponsored insurance FTP papers need to be signed at least 30 days prior to procedure. Encouraged patient to consider signing papers as this does not commit her to PPTL, but it will allow her to have the procedure should she so choose. Anjelica and her  will discuss their options and will plan to have a decision about signing FTP at their next visit.   - Feeding: desires to breastfeed    RTC in 4 weeks    30 minutes spent on the date of the encounter, doing chart review, history and exam, documentation and further activities as noted.      Divya Dong CNM on 3/8/2022 at 1:29 PM

## 2022-03-31 ENCOUNTER — OFFICE VISIT (OUTPATIENT)
Dept: MATERNAL FETAL MEDICINE | Facility: CLINIC | Age: 27
End: 2022-03-31
Attending: ADVANCED PRACTICE MIDWIFE
Payer: COMMERCIAL

## 2022-03-31 ENCOUNTER — PREP FOR PROCEDURE (OUTPATIENT)
Dept: MATERNAL FETAL MEDICINE | Facility: CLINIC | Age: 27
End: 2022-03-31

## 2022-03-31 ENCOUNTER — HOSPITAL ENCOUNTER (OUTPATIENT)
Dept: ULTRASOUND IMAGING | Facility: CLINIC | Age: 27
Discharge: HOME | End: 2022-03-31
Attending: ADVANCED PRACTICE MIDWIFE
Payer: COMMERCIAL

## 2022-03-31 VITALS
OXYGEN SATURATION: 100 % | WEIGHT: 157.1 LBS | BODY MASS INDEX: 24.6 KG/M2 | HEART RATE: 80 BPM | SYSTOLIC BLOOD PRESSURE: 123 MMHG | DIASTOLIC BLOOD PRESSURE: 64 MMHG

## 2022-03-31 DIAGNOSIS — O09.92 SUPERVISION OF HIGH RISK PREGNANCY IN SECOND TRIMESTER: ICD-10-CM

## 2022-03-31 DIAGNOSIS — O34.219 PREVIOUS CESAREAN DELIVERY, ANTEPARTUM CONDITION OR COMPLICATION: Primary | ICD-10-CM

## 2022-03-31 DIAGNOSIS — I73.9 PAD (PERIPHERAL ARTERY DISEASE) (H): ICD-10-CM

## 2022-03-31 DIAGNOSIS — Q79.62 EHLERS-DANLOS SYNDROME TYPE III: ICD-10-CM

## 2022-03-31 DIAGNOSIS — Z3A.29 PREGNANCY WITH 29 COMPLETED WEEKS GESTATION: Primary | ICD-10-CM

## 2022-03-31 DIAGNOSIS — Z11.59 ENCOUNTER FOR SCREENING FOR OTHER VIRAL DISEASES: Primary | ICD-10-CM

## 2022-03-31 LAB
ERYTHROCYTE [DISTWIDTH] IN BLOOD BY AUTOMATED COUNT: 12.6 % (ref 10–15)
GLUCOSE 1H P 50 G GLC PO SERPL-MCNC: 94 MG/DL (ref 70–129)
HCT VFR BLD AUTO: 28.7 % (ref 35–47)
HGB BLD-MCNC: 9.7 G/DL (ref 11.7–15.7)
MCH RBC QN AUTO: 31.3 PG (ref 26.5–33)
MCHC RBC AUTO-ENTMCNC: 33.8 G/DL (ref 31.5–36.5)
MCV RBC AUTO: 93 FL (ref 78–100)
PLATELET # BLD AUTO: 211 10E3/UL (ref 150–450)
RBC # BLD AUTO: 3.1 10E6/UL (ref 3.8–5.2)
T PALLIDUM AB SER QL: NONREACTIVE
WBC # BLD AUTO: 7.2 10E3/UL (ref 4–11)

## 2022-03-31 PROCEDURE — 90715 TDAP VACCINE 7 YRS/> IM: CPT

## 2022-03-31 PROCEDURE — 82950 GLUCOSE TEST: CPT | Performed by: OBSTETRICS & GYNECOLOGY

## 2022-03-31 PROCEDURE — 99212 OFFICE O/P EST SF 10 MIN: CPT | Mod: 25 | Performed by: OBSTETRICS & GYNECOLOGY

## 2022-03-31 PROCEDURE — 85027 COMPLETE CBC AUTOMATED: CPT | Performed by: OBSTETRICS & GYNECOLOGY

## 2022-03-31 PROCEDURE — 250N000011 HC RX IP 250 OP 636

## 2022-03-31 PROCEDURE — 36415 COLL VENOUS BLD VENIPUNCTURE: CPT | Performed by: OBSTETRICS & GYNECOLOGY

## 2022-03-31 PROCEDURE — 76816 OB US FOLLOW-UP PER FETUS: CPT

## 2022-03-31 PROCEDURE — 76816 OB US FOLLOW-UP PER FETUS: CPT | Mod: 26 | Performed by: OBSTETRICS & GYNECOLOGY

## 2022-03-31 PROCEDURE — 86780 TREPONEMA PALLIDUM: CPT | Performed by: OBSTETRICS & GYNECOLOGY

## 2022-03-31 PROCEDURE — 90471 IMMUNIZATION ADMIN: CPT

## 2022-03-31 PROCEDURE — G0463 HOSPITAL OUTPT CLINIC VISIT: HCPCS | Mod: 25

## 2022-03-31 NOTE — PROGRESS NOTES
"[facility] OB FOLLOW UP PRENATAL VISIT    Patient Information:   Anjelica Holloway  : 1995  MRN: [id number]    History of Present Illness:   Anjelica Holloway is a 26 year old  female at 29w6d by LMP c/w 9w4d US presenting for ongoing prenatal care.     The patient states she is feeling well today. Denies vaginal bleeding, leaking fluids, or cramping abdominal pain/contractions. Feeling baseline fetal movement. Also notes that peripheral vascular symptoms have been minimal throughout pregnancy - occasionally gets what she describes as superficial \"spider veins\" to her skin in cold weather, but denies persistent numbness/tingling to her extremities, discoloration of her digits or ulcerative lesions.     In discussion of delivery planning, patient is aware for the need of repeat  section given her history of three prior  deliveries. Notes that she would like to discuss post-partum tubal ligation at the time of surgery, and sign Federal Tubal Papers today. Comfortable with undergoing GCT and receiving Tdap vaccine as well. All other ROS negative.     Problem List:     Patient Active Problem List    Diagnosis Date Noted     S/P  section 2015     Priority: Medium     Other current maternal conditions classifiable elsewhere, antepartum 2015     Priority: Medium     Ehler's-Danlos syndrome       Ehler's-Danlos syndrome 2013     Priority: Medium     Problem list name updated by automated process. Provider to review and confirm       Vascular abnormality 2013     Priority: Medium     PAD (peripheral artery disease) (H) 2012     Priority: Medium     Non-atherosclerotic PAD due to unknown arterial occlusive disease, presumed Bill-Danlos syndrome, with superimposed vasospasm       History of Meckel's diverticulum 2012     Priority: Medium     S/P cholecystectomy 2012     Priority: Medium     Attention deficit disorder of childhood 2012     Priority: " Medium     Corpus albicans cyst of ovary 2012     Priority: Medium       Review of Allergies/Medical History/Medications:     Obstetrical History:    OB History    Para Term  AB Living   4 3 3 0 0 3   SAB IAB Ectopic Multiple Live Births   0 0 0 0 3      # Outcome Date GA Lbr Lewis/2nd Weight Sex Delivery Anes PTL Lv   4 Current            3 Term 19 38w0d  3.26 kg (7 lb 3 oz) M CS-LTranv  N KEYON      Name: SARAHMALE-SIDNEY      Apgar1: 4  Apgar5: 7   2 Term 18 38w0d  3.49 kg (7 lb 11.1 oz) F CS-LTranv Spinal N KEYON      Name: [email or web address] SIDNEY      Apgar1: 9  Apgar5: 9   1 Term 05/20/15 38w0d  3.204 kg (7 lb 1 oz) F CS-LTranv  N KEYON      Apgar1: 9  Apgar5: 9     Medical History:  Past Medical History:   Diagnosis Date     ADHD (attention deficit hyperactivity disorder)      Anxiety      Ehler's-Danlos syndrome 2013     Problem list name updated by automated process. Provider to review and confirm     Headaches      Joint hyperextensibility of multiple sites     suspect Ehler-Danlos but genetics testing not conclusive      Meckel's diverticulum     rupture of Meckel's diverticulum s/p bowel resection, approx 4 inches removed     PAD (peripheral artery disease) (H) 2012    Non-atherosclerotic PAD due to unknown arterial occlusive disease, presumed Bill-Danlos syndrome, with superimposed vasospasm      Peptic ulcer disease     secondary to prolonged NSAIDs use for headache     POTS (postural orthostatic tachycardia syndrome)      S/P  2019     Vascular abnormality 2013     Vascular anomalies, 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, and hypoplastic right posterior tibular arteries     Surgical History:  Past Surgical History:   Procedure Laterality Date     ------------OTHER-------------  rupture Meckel diverticulum with removal of necroic bowel    Removal of      APPENDECTOMY        SECTION N/A  2015    Procedure:  SECTION;  Surgeon: Zulma Chavez MD;  Location: [facility]      SECTION N/A 2018    Procedure:  SECTION;  Repeat  Section ;  Surgeon: Jayne Smith MD;  Location: [facility]      SECTION N/A 2019    Procedure: Repeat  Section;  Surgeon: Little Fishman MD;  Location: [facility]     cesearan section      x2 2018     CHOLECYSTECTOMY       OTHER SURGICAL HISTORY      Bowel resection for ruptured meckel's diverticulum     Medications:     Current Outpatient Medications:    - sildenafil (REVATIO) 20 MG tablet, Take 40 mg in the am, 20 mg in the afternoon and pm (three times total in a day), Disp: 270 tablet, Rfl: 3   - Prenatal vitamins     Allergies:   Allergies   Allergen Reactions     Ibuprofen Other (See Comments)     all NASAIDS -  Ulcers     Nsaids GI Disturbance       Social History:  She  reports that she has been smoking cigarettes. She has been smoking about 0.50 packs per day. She has never used smokeless tobacco. She reports that she does not drink alcohol and does not use drugs.     Family History:  Family History   Problem Relation Age of Onset     Lung Cancer Maternal Grandmother      Cerebrovascular Disease Paternal Aunt      Review of Systems:   All other ROS negative.     Physical Exam:     Vitals:  /64 (BP Location: Left arm, Patient Position: Sitting, Cuff Size: Adult Regular)   Pulse 80   Wt 71.3 kg (157 lb 1.6 oz)   LMP 2021   SpO2 100%   BMI 24.60 kg/m    GEN: Well appearing, no distress       Review of Labs/Diagnostics:     Recent Results (from the past 2016 hour(s))   HIV Antigen Antibody Combo    Collection Time: 22  3:00 PM   Result Value Ref Range    HIV Antigen Antibody Combo Nonreactive Nonreactive   Hepatitis B surface antigen    Collection Time: 22  3:00 PM   Result Value Ref Range    Hepatitis B Surface Antigen Nonreactive Nonreactive   Routine UA  with microscopic (EVERY OB visit)    Collection Time: 01/06/22  3:02 PM   Result Value Ref Range    Color Urine Light Yellow Colorless, Straw, Light Yellow, Yellow    Appearance Urine Slightly Cloudy (A) Clear    Glucose Urine Negative Negative mg/dL    Bilirubin Urine Negative Negative    Ketones Urine Negative Negative mg/dL    Specific Gravity Urine 1.018 1.003 - 1.035    Blood Urine Negative Negative    pH Urine 7.0 5.0 - 7.0    Protein Albumin Urine Negative Negative mg/dL    Urobilinogen Urine Normal Normal, 2.0 mg/dL    Nitrite Urine Negative Negative    Leukocyte Esterase Urine Negative Negative    Mucus Urine Present (A) None Seen /LPF    Amorphous Crystals Urine Many (A) None Seen /HPF    RBC Urine 0 <=2 /HPF    WBC Urine 0 <=5 /HPF    Squamous Epithelials Urine <1 <=1 /HPF   Treponema Abs w Reflex to RPR and Titer (Syphilis Screening)    Collection Time: 01/06/22  3:02 PM   Result Value Ref Range    Treponema Antibody Total Nonreactive Nonreactive   Chlamydia trachomatis PCR    Collection Time: 01/06/22  3:02 PM    Specimen: Urine, Voided   Result Value Ref Range    Chlamydia trachomatis Negative Negative   Neisseria gonorrhoeae PCR    Collection Time: 01/06/22  3:02 PM    Specimen: Urine, Voided   Result Value Ref Range    Neisseria gonorrhoeae Negative Negative   Rubella Antibody IgG    Collection Time: 01/06/22  3:02 PM   Result Value Ref Range    Rubella Casi IgG Instrument Value 0.85 <0.90 Index    Rubella Antibody IgG No detectable antibody. Negative   Urine Culture Aerobic Bacterial    Collection Time: 01/06/22  3:02 PM    Specimen: Urine, Midstream   Result Value Ref Range    Culture No Growth    Adult Type and Screen    Collection Time: 01/06/22  3:02 PM   Result Value Ref Range    ABO/RH(D) A POS     Antibody Screen Negative Negative    SPECIMEN EXPIRATION DATE [id number]    CBC with platelets and differential    Collection Time: 01/06/22  3:02 PM   Result Value Ref Range    WBC Count 12.5  (H) 4.0 - 11.0 10e3/uL    RBC Count 3.58 (L) 3.80 - 5.20 10e6/uL    Hemoglobin 11.5 (L) 11.7 - 15.7 g/dL    Hematocrit 34.1 (L) 35.0 - 47.0 %    MCV 95 78 - 100 fL    MCH 32.1 26.5 - 33.0 pg    MCHC 33.7 31.5 - 36.5 g/dL    RDW 12.3 10.0 - 15.0 %    Platelet Count 279 150 - 450 10e3/uL    % Neutrophils 77 %    % Lymphocytes 17 %    % Monocytes 4 %    % Eosinophils 1 %    % Basophils 0 %    % Immature Granulocytes 1 %    NRBCs per 100 WBC 0 <1 /100    Absolute Neutrophils 9.6 (H) 1.6 - 8.3 10e3/uL    Absolute Lymphocytes 2.1 0.8 - 5.3 10e3/uL    Absolute Monocytes 0.5 0.0 - 1.3 10e3/uL    Absolute Eosinophils 0.1 0.0 - 0.7 10e3/uL    Absolute Basophils 0.1 0.0 - 0.2 10e3/uL    Absolute Immature Granulocytes 0.1 <=0.4 10e3/uL    Absolute NRBCs 0.0 10e3/uL   Creatinine    Collection Time: 22 11:47 AM   Result Value Ref Range    Creatinine 0.36 (L) 0.52 - 1.04 mg/dL    GFR Estimate >90 >60 mL/min/1.73m2     Please see supervision of high risk pregnancy under problem list for prenatal labs and ultrasound.       Assessment and Plan:   Anjelica Holloway is a 26 year old  at 29w6d here for ongoing prenatal care with the [facility] service. Pregnancy complicated by a history of Bill-Danlos Type III, congenital vascular malformation in the lower extremities with persistent peripheral arterial disease, recurrent nephrolithiasis, history of  section x3, with complex  delivery during last pregnancy due to severe adhesive disease and a uterine window requiring a high-transverse hysterotomy. Recommendations at time of prior delivery were to avoid labor altogether, and plan for repeat  section at 36-37w during subsequent pregnancies.     Delivery Planning   History LTCS x2 (2015, 2018), HTCS x1 (2019)  History Uterine Window, Extensive Intraabdominal Adhesive Disease   - Discussed prior operative findings and that due to the high transverse uterine incision, delivery is recommended at 36-37 weeks.  Discussed adhesions and that she may require a vertical midline skin incision, or possibly a classical/high transverse CS. She is planning a tubal ligation at the time of surgery. FTP signed today.   - Patient thus scheduled for  section on 22 with Dr. Smith at [facility] Delivery (0830AM) - 36w2d gestational age at the time of delivery.     Satisfied Parity   Desires Bilateral Tubal Ligation for Permanent Contraception   - Discussed with patient the risks of tubal removal are similar to the  section, including risk of bleeding from the surgical site, infection or again injury to surrounding structures. Noted the additional risk of regret, as this is a permanent irreversible form of contraception. Discussed alternatives to a tubal ligation, including LARCs (IUDs, Nexplanon), patches, tablets (cOCPs, POPs) and partner vasectomy. Noted that signing the Federal Tubal Papers today was by no means binding, and she could choose to not undergo BTL at the time of surgery if she changed her mind.   - FTP thus signed today.      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     Nephrolithiasis with Right hydroureteronephrosis (resolving)   - In review, the patient was recently admitted to the Urology service at [place] for symptomatic obstructing distal ureteral stone at 20 weeks gestation on 2022. She was admitted for pain and nausea control. CT A/P at the time did show an obstructing stone in the right UPJ with subsequent hydronephrosis. Labs were not consistent with UTI at the time. The patient elected for a trial of passage, with steady improvement in her symptoms thereafter.  Continues to follow with Urology at [facility], most recently seen by their clinics 2/28/22 for repeat Renal US with stable hydronephrosis and no persistent renal calculi. Thus, prior conversation surrounding PNT placement no longer warranted.   - No evidence of pyelonephritis/or UTI at this time. Patient now asymptomatic, no longer using Flomax or straining urine.   - Will continue to monitor, concerning symptoms briefly discussed.      Anxiety  - Currently on paxil, symptoms currently stable     Routine prenatal care  - Rh positive - antibody negative. All other serologies negative. Third trimester labs also drawn today (CBC, RPR).   - Vaccines: s/p flu vaccines. S/p COVID vaccine x 2 (last 4/2021) - due for booster, to offer at next visit. s/p Tdap today.   - GCT performed today - 94. No additional glucose testing required.     Patient seen and care plan discussed with Dr. Lombardo. Return to clinic in 2 weeks for ongoing prenatal visits. Growth US every 4 weeks.     Cnydi Almanza MD   OB/GYN PGY-2  Maternal Fetal Medicine Service   3/31/22

## 2022-03-31 NOTE — PROGRESS NOTES
Pt presents to [facility] for assessment and evaluation of her pregnancy due to EDS. Pt states she is doing well at this time. Pt states +fm, no lof or bleeding. No contractions today. States she might have been having some last evening. Pt states she has not had any further issues with kidney stones. Us done and reviewed by Dr. Lombardo. See epic for today's findings. Pt had obv done and was seen by Dr. Almanza. See notes and flow sheets. Pt doing her GCT today and had tdap without any issues. Pt would like to have c/s scheduled. At this time scheduled for 5/16 at 8:30am with Dr. Smith. Pt will return in 2 weeks for obv and in 4 weeks for Rl2 and obv. Pt has no further questions at this time. Aware she should time some extra iron due to her low hemoglobin. Discharged stable. Teresa Gallardo RN

## 2022-04-14 ENCOUNTER — TELEPHONE (OUTPATIENT)
Dept: MATERNAL FETAL MEDICINE | Facility: CLINIC | Age: 27
End: 2022-04-14
Payer: COMMERCIAL

## 2022-04-14 NOTE — TELEPHONE ENCOUNTER
Pt returning call to reschedule appt. Pt states she is feeling well, no changes to her health, no OB sx/concerns today. Agreeable to taking BP at home today w/ her dad's BP cuff and will report those to PCC Rn. Plan to collaborate with MD to make plan for r/s vs cancel OB visit.

## 2022-04-27 NOTE — PROGRESS NOTES
"Maternal Fetal Medicine OB Follow up visit    Sidney Smith  : 1995  MRN: [id number]    CC: OB Follow-up    Subjective:  Sidney Smith is a 26 year old  at 33w6d presenting for routine OB follow-up.     She endorses regular FM. Denies VB or LOF; is having occasional contractions, but not painful. She has exoerienced pelvic dislocation two times in the past week. It causes excruciating pain and has gone to the chiropractor for correction. She denies any specific movement that leads to this, and states that it occurs spontaneously. This has not happened in her previous pregnancies. Also notes some increased swelling in her bilateral legs at the end of the day as well as red discoloration of both of her legs, which has also started in the past few weeks. Denies SOB or CP.     OB Hx:  OB History    Para Term  AB Living   4 3 3 0 0 3   SAB IAB Ectopic Multiple Live Births   0 0 0 0 3      # Outcome Date GA Lbr Lewis/2nd Weight Sex Delivery Anes PTL Lv   4 Current            3 Term 19 38w0d  3.26 kg (7 lb 3 oz) M CS-LTranv  N KEYON      Name: SARAHMALE-SIDNEY      Apgar1: 4  Apgar5: 7   2 Term 18 38w0d  3.49 kg (7 lb 11.1 oz) F CS-LTranv Spinal N KEYON      Name: SAVANNA SMITH1 SIDNYE      Apgar1: 9  Apgar5: 9   1 Term 05/20/15 38w0d  3.204 kg (7 lb 1 oz) F CS-LTranv  N KEYON      Apgar1: 9  Apgar5: 9       Objective:  /66   Wt 72.8 kg (160 lb 6.4 oz)   LMP 2021   BMI 25.12 kg/m      Gen: alert, oriented, NAD  Skin: warm, dry, intact  Respiratory: breathing unlabored, no SOB  Abdominal: gravid  Pelvic: deferred  Extremities: +1 pretibial edema, symmetric bilaterally, LE appear mildly red bilaterally without calor, no open wounds, superficial varicosities that are blanchable pulses not able to be obtained (which she states is her baseline), no calf tenderness.  Feet are warm and appear well perfused.     OB Ultrasound:  Please see \"imaging\" tab under chart review for " today's ultrasound results.    Assessment/Plan:  26 year old  at 33w6d here for follow OB visit.    Pregnancy has been complicated by:   - Bill-Danlos type III  - Non-atherosclerotic Peripheral arterial disease   - Hx of c/s x3 with dense adhesions with need high transverse incision  - Chronic pain    EDS  Non-atherosclerotic PAD  - Remains asymptomatic   - Negative genetic testing; FLNA gene mutation, unknown significance    - Taking sildenafil  - In the process of scheduling follow up with Dr. Gutierrez  - Discussed findings of LE erythema and swelling. Low suspicion for VTE, though warning signs reviewed. Appearance of discoloration appears to corollate to skin exposed to colder air. If begins to worsen, recommend discussing with Dr. Gutierrez.    Routine PNC  - Prenatal labs:  Rh: +  antibody: neg   HepB/HIV/RPR: nonreactive   GC/CT: negative   Rubella: non-immune     GCT: normal   UC: no growth  - Immunizations:  s/p TDap, Flu, and Covid x3 (unable to see booster in immunization tab, but patient received booster in pregnancy)  - GBS to be obtained at next visit    Delivery planning:  - Repeat  section scheduled on 2022 at 36w3d. Discussed recommendation for late  betamethasone given absence of contraindications as well as evidence for fetal benefit. She would like to discuss with her . Discussed   - Previously counseled regarding delivery last visit, including adhesions and likely need for midline vertical skin incision.   - FTP signed last visit, though still deciding if she would like to proceed with tubal ligation at the time of  section, if tubes able to be visualized.   - Feeding: desires to breastfeed    RTC in 2 weeks with GBS to be obtained. Also need to schedule BMZ course, if patient and  are agreeable.    Seen and discussed with Dr. Stroud.    Melida Mejia MD  Maternal-Fetal Medicine Fellow    Physician Attestation   I, Jatinder Stroud MD, saw this  patient and agree with the findings and plan of care as documented in the note.      Items personally reviewed/procedural attestation: vitals and imaging and agree with the interpretation documented in the note.    Jatinder Stroud MD    I spent a total of 30 minutes on the date of this encounter in the care of Anjelica Holloway, includin minutes reviewing the patient's chart, 20 minutes in direct patient contact, 5 minutes documenting in the medical record.  Please see note for details.

## 2022-04-28 ENCOUNTER — HOSPITAL ENCOUNTER (OUTPATIENT)
Dept: ULTRASOUND IMAGING | Facility: CLINIC | Age: 27
Discharge: HOME | End: 2022-04-28
Attending: OBSTETRICS & GYNECOLOGY
Payer: COMMERCIAL

## 2022-04-28 ENCOUNTER — OFFICE VISIT (OUTPATIENT)
Dept: MATERNAL FETAL MEDICINE | Facility: CLINIC | Age: 27
End: 2022-04-28
Attending: OBSTETRICS & GYNECOLOGY
Payer: COMMERCIAL

## 2022-04-28 VITALS — BODY MASS INDEX: 25.12 KG/M2 | WEIGHT: 160.4 LBS | DIASTOLIC BLOOD PRESSURE: 66 MMHG | SYSTOLIC BLOOD PRESSURE: 128 MMHG

## 2022-04-28 DIAGNOSIS — Q79.62 EHLERS-DANLOS SYNDROME TYPE III: ICD-10-CM

## 2022-04-28 DIAGNOSIS — Z3A.29 PREGNANCY WITH 29 COMPLETED WEEKS GESTATION: ICD-10-CM

## 2022-04-28 DIAGNOSIS — O09.93 SUPERVISION OF HIGH RISK PREGNANCY IN THIRD TRIMESTER: Primary | ICD-10-CM

## 2022-04-28 DIAGNOSIS — O09.92 SUPERVISION OF HIGH RISK PREGNANCY IN SECOND TRIMESTER: ICD-10-CM

## 2022-04-28 DIAGNOSIS — I73.9 PAD (PERIPHERAL ARTERY DISEASE) (H): ICD-10-CM

## 2022-04-28 DIAGNOSIS — O26.90 PREGNANCY RELATED CONDITION, ANTEPARTUM: ICD-10-CM

## 2022-04-28 PROCEDURE — G0463 HOSPITAL OUTPT CLINIC VISIT: HCPCS | Mod: 25

## 2022-04-28 PROCEDURE — 76816 OB US FOLLOW-UP PER FETUS: CPT

## 2022-04-28 PROCEDURE — 99214 OFFICE O/P EST MOD 30 MIN: CPT | Mod: 25 | Performed by: OBSTETRICS & GYNECOLOGY

## 2022-04-28 PROCEDURE — 76816 OB US FOLLOW-UP PER FETUS: CPT | Mod: 26 | Performed by: OBSTETRICS & GYNECOLOGY

## 2022-04-28 NOTE — PROGRESS NOTES
Pt presents to [facility] for assessment and evaluation of her pregnancy due to EDS and hx of c/s x3. Pt states she is doing well over all. Pt states +fm, no lof or bleeding. No contractions. Pt states she has been having issues with her hip coming out of place. States she has been going to chiropractor for this. Us done today and reviewed by Dr. Stroud. See [facility] for today's findings. Pt was seen today for osbv by Dr. Mejia. See notes and flow sheets. Pt has c/s set for 5/16. Aware she will need to have covid and labs done prior. Will return to [facility] for one visit prior to her delivery. Pt was offered and would like to think about receiving betamethasone. Will call pt to discuss after she has had time to discuss with her . Questions answered today. Discharged stable at this time. Teresa Gallardo RN

## 2022-05-04 ENCOUNTER — TELEPHONE (OUTPATIENT)
Dept: MATERNAL FETAL MEDICINE | Facility: CLINIC | Age: 27
End: 2022-05-04
Payer: COMMERCIAL

## 2022-05-04 DIAGNOSIS — Q79.62 EHLERS-DANLOS SYNDROME TYPE III: Primary | ICD-10-CM

## 2022-05-04 NOTE — TELEPHONE ENCOUNTER
Phone call to pt re scheduling follow up apt prior to her delivery. Pt states she is not overly interested in Betamethasone at this time. Would like to come on 5/13 at 11:45 for obv. Will do her GBS, Covid and possible her blood work at that time. Aware she will get her c/s information, drink and soap at that time also. Pt aware if she changes her mind re steroids to call back and this scribe will help to schedule her. Pt states understanding. Has PCC line to call. Teresa Gallardo RN

## 2022-05-13 ENCOUNTER — APPOINTMENT (OUTPATIENT)
Dept: LAB | Facility: CLINIC | Age: 27
End: 2022-05-13
Attending: OBSTETRICS & GYNECOLOGY
Payer: COMMERCIAL

## 2022-05-13 ENCOUNTER — OFFICE VISIT (OUTPATIENT)
Dept: MATERNAL FETAL MEDICINE | Facility: CLINIC | Age: 27
End: 2022-05-13
Attending: OBSTETRICS & GYNECOLOGY
Payer: COMMERCIAL

## 2022-05-13 VITALS
OXYGEN SATURATION: 100 % | SYSTOLIC BLOOD PRESSURE: 116 MMHG | WEIGHT: 165.3 LBS | HEART RATE: 58 BPM | DIASTOLIC BLOOD PRESSURE: 66 MMHG | BODY MASS INDEX: 25.88 KG/M2

## 2022-05-13 DIAGNOSIS — Q79.62 EHLERS-DANLOS SYNDROME TYPE III: ICD-10-CM

## 2022-05-13 LAB
ABO/RH(D): NORMAL
ANTIBODY SCREEN: NEGATIVE
ERYTHROCYTE [DISTWIDTH] IN BLOOD BY AUTOMATED COUNT: 13.2 % (ref 10–15)
HCT VFR BLD AUTO: 31.3 % (ref 35–47)
HGB BLD-MCNC: 10.2 G/DL (ref 11.7–15.7)
MCH RBC QN AUTO: 31.3 PG (ref 26.5–33)
MCHC RBC AUTO-ENTMCNC: 32.6 G/DL (ref 31.5–36.5)
MCV RBC AUTO: 96 FL (ref 78–100)
PLATELET # BLD AUTO: 280 10E3/UL (ref 150–450)
RBC # BLD AUTO: 3.26 10E6/UL (ref 3.8–5.2)
SARS-COV-2 RNA RESP QL NAA+PROBE: POSITIVE
SPECIMEN EXPIRATION DATE: NORMAL
WBC # BLD AUTO: 10.8 10E3/UL (ref 4–11)

## 2022-05-13 PROCEDURE — 86900 BLOOD TYPING SEROLOGIC ABO: CPT

## 2022-05-13 PROCEDURE — 86780 TREPONEMA PALLIDUM: CPT

## 2022-05-13 PROCEDURE — U0005 INFEC AGEN DETEC AMPLI PROBE: HCPCS

## 2022-05-13 PROCEDURE — 36415 COLL VENOUS BLD VENIPUNCTURE: CPT

## 2022-05-13 PROCEDURE — G0463 HOSPITAL OUTPT CLINIC VISIT: HCPCS

## 2022-05-13 PROCEDURE — 87653 STREP B DNA AMP PROBE: CPT

## 2022-05-13 PROCEDURE — 85027 COMPLETE CBC AUTOMATED: CPT

## 2022-05-13 NOTE — PROGRESS NOTES
Pt presents to [facility] for obv and c/s planning for Monday. Pt states she is doing well at this time. Pt states +fm, no lof or bleeding. No contractions. Fht 130's by eloy. GBS and Covid done today. Pt will go to lab for preop labs. Pt given showering information and preparing for surgery information. Given shower scrub and perop drink. Has no questions at this time. Pt was seen today by Dr. Finley for her obv. See note and flow sheet. Knows when to come in or call this weekend if she has any further questions. Discharged stable at this time. Teresa Gallardo RN

## 2022-05-14 ENCOUNTER — TELEPHONE (OUTPATIENT)
Dept: NURSING | Facility: CLINIC | Age: 27
End: 2022-05-14
Payer: COMMERCIAL

## 2022-05-14 LAB
GP B STREP DNA SPEC QL NAA+PROBE: NEGATIVE
T PALLIDUM AB SER QL: NONREACTIVE

## 2022-05-14 NOTE — TELEPHONE ENCOUNTER
Patient classified as COVID treatment eligible by Epic high risk algorithm:  Yes  Procedure planned 5/16/2022      Coronavirus (COVID-19) Notification    Reason for call  Notify of POSITIVE COVID-19 lab result, assess symptoms,  review [facility] recommendations    Lab Result   Lab test for 2019-nCoV rRt-PCR or SARS-COV-2 PCR  Oropharyngeal AND/OR nasopharyngeal swabs were POSITIVE for 2019-nCoV RNA [OR] SARS-COV-2 RNA (COVID-19) RNA     We have been unable to reach patient by phone at this time to notify of their Positive COVID-19 result.    Left voicemail message requesting a call back to [phone] [facility] for results.        A Positive COVID-19 letter will be sent via [employer] or the mail. (Exception, no letters sent to Presurgerical/Preprocedure Patients)    Mari Renee LPN

## 2022-05-16 ENCOUNTER — HOSPITAL ENCOUNTER (INPATIENT)
Facility: CLINIC | Age: 27
LOS: 3 days | Discharge: HOME HEALTH | End: 2022-05-19
Attending: OBSTETRICS & GYNECOLOGY | Admitting: OBSTETRICS & GYNECOLOGY
Payer: COMMERCIAL

## 2022-05-16 ENCOUNTER — ANESTHESIA EVENT (OUTPATIENT)
Dept: OBGYN | Facility: CLINIC | Age: 27
End: 2022-05-16
Payer: COMMERCIAL

## 2022-05-16 ENCOUNTER — ANESTHESIA (OUTPATIENT)
Dept: OBGYN | Facility: CLINIC | Age: 27
End: 2022-05-16
Payer: COMMERCIAL

## 2022-05-16 DIAGNOSIS — G89.18 ACUTE POST-OPERATIVE PAIN: ICD-10-CM

## 2022-05-16 DIAGNOSIS — Z98.891 S/P CESAREAN SECTION: Primary | ICD-10-CM

## 2022-05-16 LAB
BLD PROD TYP BPU: NORMAL
BLD PROD TYP BPU: NORMAL
BLOOD COMPONENT TYPE: NORMAL
BLOOD COMPONENT TYPE: NORMAL
CODING SYSTEM: NORMAL
CODING SYSTEM: NORMAL
CREAT SERPL-MCNC: 0.38 MG/DL (ref 0.52–1.04)
CROSSMATCH: NORMAL
CROSSMATCH: NORMAL
GFR SERPL CREATININE-BSD FRML MDRD: >90 ML/MIN/1.73M2
UNIT ABO/RH: NORMAL
UNIT ABO/RH: NORMAL
UNIT NUMBER: NORMAL
UNIT NUMBER: NORMAL
UNIT STATUS: NORMAL
UNIT STATUS: NORMAL
UNIT TYPE ISBT: 6200
UNIT TYPE ISBT: 6200

## 2022-05-16 PROCEDURE — 250N000011 HC RX IP 250 OP 636

## 2022-05-16 PROCEDURE — 360N000076 HC SURGERY LEVEL 3, PER MIN: Performed by: OBSTETRICS & GYNECOLOGY

## 2022-05-16 PROCEDURE — 250N000009 HC RX 250

## 2022-05-16 PROCEDURE — 36415 COLL VENOUS BLD VENIPUNCTURE: CPT | Performed by: STUDENT IN AN ORGANIZED HEALTH CARE EDUCATION/TRAINING PROGRAM

## 2022-05-16 PROCEDURE — 250N000013 HC RX MED GY IP 250 OP 250 PS 637: Performed by: OBSTETRICS & GYNECOLOGY

## 2022-05-16 PROCEDURE — 82565 ASSAY OF CREATININE: CPT | Performed by: STUDENT IN AN ORGANIZED HEALTH CARE EDUCATION/TRAINING PROGRAM

## 2022-05-16 PROCEDURE — 258N000003 HC RX IP 258 OP 636

## 2022-05-16 PROCEDURE — 0UN90ZZ RELEASE UTERUS, OPEN APPROACH: ICD-10-PCS | Performed by: OBSTETRICS & GYNECOLOGY

## 2022-05-16 PROCEDURE — 250N000009 HC RX 250: Performed by: OBSTETRICS & GYNECOLOGY

## 2022-05-16 PROCEDURE — 250N000011 HC RX IP 250 OP 636: Performed by: ANESTHESIOLOGY

## 2022-05-16 PROCEDURE — 250N000009 HC RX 250: Performed by: STUDENT IN AN ORGANIZED HEALTH CARE EDUCATION/TRAINING PROGRAM

## 2022-05-16 PROCEDURE — C1765 ADHESION BARRIER: HCPCS | Performed by: OBSTETRICS & GYNECOLOGY

## 2022-05-16 PROCEDURE — 999N000141 HC STATISTIC PRE-PROCEDURE NURSING ASSESSMENT: Performed by: OBSTETRICS & GYNECOLOGY

## 2022-05-16 PROCEDURE — 272N000001 HC OR GENERAL SUPPLY STERILE: Performed by: OBSTETRICS & GYNECOLOGY

## 2022-05-16 PROCEDURE — 250N000013 HC RX MED GY IP 250 OP 250 PS 637: Performed by: STUDENT IN AN ORGANIZED HEALTH CARE EDUCATION/TRAINING PROGRAM

## 2022-05-16 PROCEDURE — C9290 INJ, BUPIVACAINE LIPOSOME: HCPCS | Performed by: ANESTHESIOLOGY

## 2022-05-16 PROCEDURE — 250N000013 HC RX MED GY IP 250 OP 250 PS 637

## 2022-05-16 PROCEDURE — 710N000010 HC RECOVERY PHASE 1, LEVEL 2, PER MIN: Performed by: OBSTETRICS & GYNECOLOGY

## 2022-05-16 PROCEDURE — 86923 COMPATIBILITY TEST ELECTRIC: CPT | Performed by: STUDENT IN AN ORGANIZED HEALTH CARE EDUCATION/TRAINING PROGRAM

## 2022-05-16 PROCEDURE — 59514 CESAREAN DELIVERY ONLY: CPT | Mod: GC | Performed by: OBSTETRICS & GYNECOLOGY

## 2022-05-16 PROCEDURE — 370N000017 HC ANESTHESIA TECHNICAL FEE, PER MIN: Performed by: OBSTETRICS & GYNECOLOGY

## 2022-05-16 PROCEDURE — 0JN80ZZ RELEASE ABDOMEN SUBCUTANEOUS TISSUE AND FASCIA, OPEN APPROACH: ICD-10-PCS | Performed by: OBSTETRICS & GYNECOLOGY

## 2022-05-16 PROCEDURE — 250N000011 HC RX IP 250 OP 636: Performed by: STUDENT IN AN ORGANIZED HEALTH CARE EDUCATION/TRAINING PROGRAM

## 2022-05-16 PROCEDURE — 120N000002 HC R&B MED SURG/OB UMMC

## 2022-05-16 PROCEDURE — 271N000001 HC OR GENERAL SUPPLY NON-STERILE: Performed by: OBSTETRICS & GYNECOLOGY

## 2022-05-16 PROCEDURE — 250N000011 HC RX IP 250 OP 636: Performed by: OBSTETRICS & GYNECOLOGY

## 2022-05-16 RX ORDER — FENTANYL CITRATE 50 UG/ML
INJECTION, SOLUTION INTRAMUSCULAR; INTRAVENOUS
Status: DISCONTINUED
Start: 2022-05-16 | End: 2022-05-16 | Stop reason: WASHOUT

## 2022-05-16 RX ORDER — LIDOCAINE 40 MG/G
CREAM TOPICAL
Status: DISCONTINUED | OUTPATIENT
Start: 2022-05-16 | End: 2022-05-19 | Stop reason: HOSPADM

## 2022-05-16 RX ORDER — MISOPROSTOL 200 UG/1
800 TABLET ORAL
Status: DISCONTINUED | OUTPATIENT
Start: 2022-05-16 | End: 2022-05-19 | Stop reason: HOSPADM

## 2022-05-16 RX ORDER — CEFAZOLIN SODIUM 2 G/100ML
2 INJECTION, SOLUTION INTRAVENOUS SEE ADMIN INSTRUCTIONS
Status: DISCONTINUED | OUTPATIENT
Start: 2022-05-16 | End: 2022-05-16 | Stop reason: HOSPADM

## 2022-05-16 RX ORDER — OXYTOCIN 10 [USP'U]/ML
10 INJECTION, SOLUTION INTRAMUSCULAR; INTRAVENOUS
Status: DISCONTINUED | OUTPATIENT
Start: 2022-05-16 | End: 2022-05-19 | Stop reason: HOSPADM

## 2022-05-16 RX ORDER — NALOXONE HYDROCHLORIDE 0.4 MG/ML
0.2 INJECTION, SOLUTION INTRAMUSCULAR; INTRAVENOUS; SUBCUTANEOUS
Status: DISCONTINUED | OUTPATIENT
Start: 2022-05-16 | End: 2022-05-19 | Stop reason: HOSPADM

## 2022-05-16 RX ORDER — HYDROMORPHONE HYDROCHLORIDE 2 MG/1
2-4 TABLET ORAL EVERY 4 HOURS PRN
Status: DISCONTINUED | OUTPATIENT
Start: 2022-05-16 | End: 2022-05-19 | Stop reason: HOSPADM

## 2022-05-16 RX ORDER — CARBOPROST TROMETHAMINE 250 UG/ML
250 INJECTION, SOLUTION INTRAMUSCULAR
Status: DISCONTINUED | OUTPATIENT
Start: 2022-05-16 | End: 2022-05-19 | Stop reason: HOSPADM

## 2022-05-16 RX ORDER — LIDOCAINE HCL/EPINEPHRINE/PF 2%-1:200K
VIAL (ML) INJECTION PRN
Status: DISCONTINUED | OUTPATIENT
Start: 2022-05-16 | End: 2022-05-16

## 2022-05-16 RX ORDER — SODIUM CHLORIDE, SODIUM LACTATE, POTASSIUM CHLORIDE, CALCIUM CHLORIDE 600; 310; 30; 20 MG/100ML; MG/100ML; MG/100ML; MG/100ML
INJECTION, SOLUTION INTRAVENOUS
Status: COMPLETED
Start: 2022-05-16 | End: 2022-05-16

## 2022-05-16 RX ORDER — OXYTOCIN 10 [USP'U]/ML
10 INJECTION, SOLUTION INTRAMUSCULAR; INTRAVENOUS
Status: DISCONTINUED | OUTPATIENT
Start: 2022-05-16 | End: 2022-05-16

## 2022-05-16 RX ORDER — NALBUPHINE HYDROCHLORIDE 20 MG/ML
2.5-5 INJECTION, SOLUTION INTRAMUSCULAR; INTRAVENOUS; SUBCUTANEOUS EVERY 6 HOURS PRN
Status: DISCONTINUED | OUTPATIENT
Start: 2022-05-16 | End: 2022-05-16

## 2022-05-16 RX ORDER — TRANEXAMIC ACID 10 MG/ML
1 INJECTION, SOLUTION INTRAVENOUS EVERY 30 MIN PRN
Status: DISCONTINUED | OUTPATIENT
Start: 2022-05-16 | End: 2022-05-16 | Stop reason: HOSPADM

## 2022-05-16 RX ORDER — MODIFIED LANOLIN
OINTMENT (GRAM) TOPICAL
Status: DISCONTINUED | OUTPATIENT
Start: 2022-05-16 | End: 2022-05-19 | Stop reason: HOSPADM

## 2022-05-16 RX ORDER — METHYLERGONOVINE MALEATE 0.2 MG/ML
200 INJECTION INTRAVENOUS
Status: DISCONTINUED | OUTPATIENT
Start: 2022-05-16 | End: 2022-05-19 | Stop reason: HOSPADM

## 2022-05-16 RX ORDER — CEFAZOLIN SODIUM 2 G/100ML
INJECTION, SOLUTION INTRAVENOUS
Status: DISCONTINUED
Start: 2022-05-16 | End: 2022-05-16 | Stop reason: HOSPADM

## 2022-05-16 RX ORDER — FENTANYL CITRATE-0.9 % NACL/PF 10 MCG/ML
100 PLASTIC BAG, INJECTION (ML) INTRAVENOUS EVERY 5 MIN PRN
Status: DISCONTINUED | OUTPATIENT
Start: 2022-05-16 | End: 2022-05-16 | Stop reason: HOSPADM

## 2022-05-16 RX ORDER — NALOXONE HYDROCHLORIDE 0.4 MG/ML
0.2 INJECTION, SOLUTION INTRAMUSCULAR; INTRAVENOUS; SUBCUTANEOUS
Status: DISCONTINUED | OUTPATIENT
Start: 2022-05-16 | End: 2022-05-16

## 2022-05-16 RX ORDER — BUPIVACAINE HYDROCHLORIDE 7.5 MG/ML
INJECTION, SOLUTION INTRASPINAL
Status: COMPLETED | OUTPATIENT
Start: 2022-05-16 | End: 2022-05-16

## 2022-05-16 RX ORDER — MISOPROSTOL 200 UG/1
800 TABLET ORAL
Status: DISCONTINUED | OUTPATIENT
Start: 2022-05-16 | End: 2022-05-16 | Stop reason: HOSPADM

## 2022-05-16 RX ORDER — FENTANYL CITRATE 50 UG/ML
INJECTION, SOLUTION INTRAMUSCULAR; INTRAVENOUS PRN
Status: DISCONTINUED | OUTPATIENT
Start: 2022-05-16 | End: 2022-05-16

## 2022-05-16 RX ORDER — SIMETHICONE 80 MG
80 TABLET,CHEWABLE ORAL 4 TIMES DAILY PRN
Status: DISCONTINUED | OUTPATIENT
Start: 2022-05-16 | End: 2022-05-19 | Stop reason: HOSPADM

## 2022-05-16 RX ORDER — PAROXETINE HYDROCHLORIDE 10 MG/1
5 TABLET, FILM COATED ORAL EVERY EVENING
Status: DISCONTINUED | OUTPATIENT
Start: 2022-05-17 | End: 2022-05-19 | Stop reason: HOSPADM

## 2022-05-16 RX ORDER — SODIUM CHLORIDE, SODIUM LACTATE, POTASSIUM CHLORIDE, CALCIUM CHLORIDE 600; 310; 30; 20 MG/100ML; MG/100ML; MG/100ML; MG/100ML
INJECTION, SOLUTION INTRAVENOUS CONTINUOUS PRN
Status: DISCONTINUED | OUTPATIENT
Start: 2022-05-16 | End: 2022-05-16

## 2022-05-16 RX ORDER — MAGNESIUM HYDROXIDE 1200 MG/15ML
LIQUID ORAL PRN
Status: DISCONTINUED | OUTPATIENT
Start: 2022-05-16 | End: 2022-05-16

## 2022-05-16 RX ORDER — SILDENAFIL CITRATE 20 MG/1
20 TABLET ORAL 2 TIMES DAILY
Status: DISCONTINUED | OUTPATIENT
Start: 2022-05-16 | End: 2022-05-19 | Stop reason: HOSPADM

## 2022-05-16 RX ORDER — AMOXICILLIN 250 MG
1 CAPSULE ORAL 2 TIMES DAILY
Status: DISCONTINUED | OUTPATIENT
Start: 2022-05-16 | End: 2022-05-19 | Stop reason: HOSPADM

## 2022-05-16 RX ORDER — ONDANSETRON 4 MG/1
4 TABLET, ORALLY DISINTEGRATING ORAL EVERY 6 HOURS PRN
Status: DISCONTINUED | OUTPATIENT
Start: 2022-05-16 | End: 2022-05-19 | Stop reason: HOSPADM

## 2022-05-16 RX ORDER — OXYTOCIN/0.9 % SODIUM CHLORIDE 30/500 ML
100-340 PLASTIC BAG, INJECTION (ML) INTRAVENOUS CONTINUOUS PRN
Status: DISCONTINUED | OUTPATIENT
Start: 2022-05-16 | End: 2022-05-16

## 2022-05-16 RX ORDER — LIDOCAINE 40 MG/G
CREAM TOPICAL
Status: DISCONTINUED | OUTPATIENT
Start: 2022-05-16 | End: 2022-05-16 | Stop reason: HOSPADM

## 2022-05-16 RX ORDER — SILDENAFIL CITRATE 20 MG/1
20 TABLET ORAL 2 TIMES DAILY
COMMUNITY
End: 2022-09-15

## 2022-05-16 RX ORDER — AMOXICILLIN 250 MG
2 CAPSULE ORAL 2 TIMES DAILY
Status: DISCONTINUED | OUTPATIENT
Start: 2022-05-16 | End: 2022-05-19 | Stop reason: HOSPADM

## 2022-05-16 RX ORDER — FLUMAZENIL 0.1 MG/ML
0.2 INJECTION, SOLUTION INTRAVENOUS
Status: DISCONTINUED | OUTPATIENT
Start: 2022-05-16 | End: 2022-05-16 | Stop reason: HOSPADM

## 2022-05-16 RX ORDER — DEXTROSE, SODIUM CHLORIDE, SODIUM LACTATE, POTASSIUM CHLORIDE, AND CALCIUM CHLORIDE 5; .6; .31; .03; .02 G/100ML; G/100ML; G/100ML; G/100ML; G/100ML
INJECTION, SOLUTION INTRAVENOUS CONTINUOUS
Status: DISCONTINUED | OUTPATIENT
Start: 2022-05-16 | End: 2022-05-19 | Stop reason: HOSPADM

## 2022-05-16 RX ORDER — NALOXONE HYDROCHLORIDE 0.4 MG/ML
0.4 INJECTION, SOLUTION INTRAMUSCULAR; INTRAVENOUS; SUBCUTANEOUS
Status: DISCONTINUED | OUTPATIENT
Start: 2022-05-16 | End: 2022-05-16

## 2022-05-16 RX ORDER — CITRIC ACID/SODIUM CITRATE 334-500MG
SOLUTION, ORAL ORAL
Status: COMPLETED
Start: 2022-05-16 | End: 2022-05-16

## 2022-05-16 RX ORDER — TRANEXAMIC ACID 10 MG/ML
1 INJECTION, SOLUTION INTRAVENOUS EVERY 30 MIN PRN
Status: DISCONTINUED | OUTPATIENT
Start: 2022-05-16 | End: 2022-05-19 | Stop reason: HOSPADM

## 2022-05-16 RX ORDER — BUPIVACAINE HYDROCHLORIDE 2.5 MG/ML
INJECTION, SOLUTION EPIDURAL; INFILTRATION; INTRACAUDAL
Status: COMPLETED | OUTPATIENT
Start: 2022-05-16 | End: 2022-05-16

## 2022-05-16 RX ORDER — FENTANYL CITRATE 50 UG/ML
INJECTION, SOLUTION INTRAMUSCULAR; INTRAVENOUS
Status: COMPLETED | OUTPATIENT
Start: 2022-05-16 | End: 2022-05-16

## 2022-05-16 RX ORDER — OXYTOCIN/0.9 % SODIUM CHLORIDE 30/500 ML
340 PLASTIC BAG, INJECTION (ML) INTRAVENOUS CONTINUOUS PRN
Status: DISCONTINUED | OUTPATIENT
Start: 2022-05-16 | End: 2022-05-19 | Stop reason: HOSPADM

## 2022-05-16 RX ORDER — ACETAMINOPHEN 325 MG/1
975 TABLET ORAL ONCE
Status: COMPLETED | OUTPATIENT
Start: 2022-05-16 | End: 2022-05-16

## 2022-05-16 RX ORDER — METHYLERGONOVINE MALEATE 0.2 MG/ML
200 INJECTION INTRAVENOUS
Status: DISCONTINUED | OUTPATIENT
Start: 2022-05-16 | End: 2022-05-16 | Stop reason: HOSPADM

## 2022-05-16 RX ORDER — METOCLOPRAMIDE HYDROCHLORIDE 5 MG/ML
10 INJECTION INTRAMUSCULAR; INTRAVENOUS EVERY 6 HOURS PRN
Status: DISCONTINUED | OUTPATIENT
Start: 2022-05-16 | End: 2022-05-19 | Stop reason: HOSPADM

## 2022-05-16 RX ORDER — PAROXETINE 10 MG/1
5 TABLET, FILM COATED ORAL EVERY EVENING
COMMUNITY
End: 2022-09-15

## 2022-05-16 RX ORDER — ONDANSETRON 2 MG/ML
INJECTION INTRAMUSCULAR; INTRAVENOUS PRN
Status: DISCONTINUED | OUTPATIENT
Start: 2022-05-16 | End: 2022-05-16

## 2022-05-16 RX ORDER — NALOXONE HYDROCHLORIDE 0.4 MG/ML
0.4 INJECTION, SOLUTION INTRAMUSCULAR; INTRAVENOUS; SUBCUTANEOUS
Status: DISCONTINUED | OUTPATIENT
Start: 2022-05-16 | End: 2022-05-19 | Stop reason: HOSPADM

## 2022-05-16 RX ORDER — PROCHLORPERAZINE 25 MG
25 SUPPOSITORY, RECTAL RECTAL EVERY 12 HOURS PRN
Status: DISCONTINUED | OUTPATIENT
Start: 2022-05-16 | End: 2022-05-19 | Stop reason: HOSPADM

## 2022-05-16 RX ORDER — METOCLOPRAMIDE 10 MG/1
10 TABLET ORAL EVERY 6 HOURS PRN
Status: DISCONTINUED | OUTPATIENT
Start: 2022-05-16 | End: 2022-05-19 | Stop reason: HOSPADM

## 2022-05-16 RX ORDER — OXYTOCIN 10 [USP'U]/ML
10 INJECTION, SOLUTION INTRAMUSCULAR; INTRAVENOUS
Status: DISCONTINUED | OUTPATIENT
Start: 2022-05-16 | End: 2022-05-16 | Stop reason: HOSPADM

## 2022-05-16 RX ORDER — HYDROCODONE BITARTRATE AND ACETAMINOPHEN 5; 325 MG/1; MG/1
1-2 TABLET ORAL EVERY 4 HOURS PRN
Status: DISCONTINUED | OUTPATIENT
Start: 2022-05-16 | End: 2022-05-19 | Stop reason: HOSPADM

## 2022-05-16 RX ORDER — MISOPROSTOL 200 UG/1
400 TABLET ORAL
Status: DISCONTINUED | OUTPATIENT
Start: 2022-05-16 | End: 2022-05-16 | Stop reason: HOSPADM

## 2022-05-16 RX ORDER — BISACODYL 10 MG
10 SUPPOSITORY, RECTAL RECTAL DAILY PRN
Status: DISCONTINUED | OUTPATIENT
Start: 2022-05-18 | End: 2022-05-19 | Stop reason: HOSPADM

## 2022-05-16 RX ORDER — CEFAZOLIN SODIUM 2 G/100ML
2 INJECTION, SOLUTION INTRAVENOUS
Status: COMPLETED | OUTPATIENT
Start: 2022-05-16 | End: 2022-05-16

## 2022-05-16 RX ORDER — ACETAMINOPHEN 325 MG/1
975 TABLET ORAL EVERY 6 HOURS
Status: DISCONTINUED | OUTPATIENT
Start: 2022-05-16 | End: 2022-05-16

## 2022-05-16 RX ORDER — HYDROCORTISONE 25 MG/G
CREAM TOPICAL 3 TIMES DAILY PRN
Status: DISCONTINUED | OUTPATIENT
Start: 2022-05-16 | End: 2022-05-19 | Stop reason: HOSPADM

## 2022-05-16 RX ORDER — CARBOPROST TROMETHAMINE 250 UG/ML
250 INJECTION, SOLUTION INTRAMUSCULAR
Status: DISCONTINUED | OUTPATIENT
Start: 2022-05-16 | End: 2022-05-16 | Stop reason: HOSPADM

## 2022-05-16 RX ORDER — OXYTOCIN/0.9 % SODIUM CHLORIDE 30/500 ML
340 PLASTIC BAG, INJECTION (ML) INTRAVENOUS CONTINUOUS PRN
Status: DISCONTINUED | OUTPATIENT
Start: 2022-05-16 | End: 2022-05-16 | Stop reason: HOSPADM

## 2022-05-16 RX ORDER — CITRIC ACID/SODIUM CITRATE 334-500MG
30 SOLUTION, ORAL ORAL
Status: DISCONTINUED | OUTPATIENT
Start: 2022-05-16 | End: 2022-05-16 | Stop reason: HOSPADM

## 2022-05-16 RX ORDER — ENOXAPARIN SODIUM 100 MG/ML
40 INJECTION SUBCUTANEOUS EVERY 24 HOURS
Status: DISCONTINUED | OUTPATIENT
Start: 2022-05-17 | End: 2022-05-19 | Stop reason: HOSPADM

## 2022-05-16 RX ORDER — MISOPROSTOL 200 UG/1
400 TABLET ORAL
Status: DISCONTINUED | OUTPATIENT
Start: 2022-05-16 | End: 2022-05-19 | Stop reason: HOSPADM

## 2022-05-16 RX ORDER — SODIUM CHLORIDE, SODIUM LACTATE, POTASSIUM CHLORIDE, CALCIUM CHLORIDE 600; 310; 30; 20 MG/100ML; MG/100ML; MG/100ML; MG/100ML
INJECTION, SOLUTION INTRAVENOUS CONTINUOUS
Status: DISCONTINUED | OUTPATIENT
Start: 2022-05-16 | End: 2022-05-16 | Stop reason: HOSPADM

## 2022-05-16 RX ORDER — ONDANSETRON 2 MG/ML
4 INJECTION INTRAMUSCULAR; INTRAVENOUS EVERY 6 HOURS PRN
Status: DISCONTINUED | OUTPATIENT
Start: 2022-05-16 | End: 2022-05-19 | Stop reason: HOSPADM

## 2022-05-16 RX ORDER — PROCHLORPERAZINE MALEATE 10 MG
10 TABLET ORAL EVERY 6 HOURS PRN
Status: DISCONTINUED | OUTPATIENT
Start: 2022-05-16 | End: 2022-05-19 | Stop reason: HOSPADM

## 2022-05-16 RX ORDER — IBUPROFEN 800 MG/1
800 TABLET, FILM COATED ORAL EVERY 6 HOURS PRN
Status: DISCONTINUED | OUTPATIENT
Start: 2022-05-17 | End: 2022-05-19 | Stop reason: HOSPADM

## 2022-05-16 RX ORDER — KETOROLAC TROMETHAMINE 30 MG/ML
30 INJECTION, SOLUTION INTRAMUSCULAR; INTRAVENOUS EVERY 6 HOURS PRN
Status: DISCONTINUED | OUTPATIENT
Start: 2022-05-16 | End: 2022-05-19 | Stop reason: HOSPADM

## 2022-05-16 RX ORDER — MORPHINE SULFATE 1 MG/ML
INJECTION, SOLUTION EPIDURAL; INTRATHECAL; INTRAVENOUS
Status: COMPLETED | OUTPATIENT
Start: 2022-05-16 | End: 2022-05-16

## 2022-05-16 RX ADMIN — BUPIVACAINE HYDROCHLORIDE 20 ML: 2.5 INJECTION, SOLUTION EPIDURAL; INFILTRATION; INTRACAUDAL at 11:00

## 2022-05-16 RX ADMIN — ACETAMINOPHEN 975 MG: 325 TABLET ORAL at 14:32

## 2022-05-16 RX ADMIN — HYDROCODONE BITARTRATE AND ACETAMINOPHEN 2 TABLET: 5; 325 TABLET ORAL at 21:00

## 2022-05-16 RX ADMIN — Medication 2 G: at 09:06

## 2022-05-16 RX ADMIN — SODIUM CITRATE AND CITRIC ACID MONOHYDRATE 30 ML: 500; 334 SOLUTION ORAL at 08:17

## 2022-05-16 RX ADMIN — KETOROLAC TROMETHAMINE 30 MG: 30 INJECTION, SOLUTION INTRAMUSCULAR at 23:15

## 2022-05-16 RX ADMIN — FENTANYL CITRATE 50 MCG: 50 INJECTION, SOLUTION INTRAMUSCULAR; INTRAVENOUS at 10:10

## 2022-05-16 RX ADMIN — SILDENAFIL 20 MG: 20 TABLET, FILM COATED ORAL at 21:00

## 2022-05-16 RX ADMIN — LIDOCAINE HYDROCHLORIDE,EPINEPHRINE BITARTRATE 5 ML: 20; .005 INJECTION, SOLUTION EPIDURAL; INFILTRATION; INTRACAUDAL; PERINEURAL at 10:10

## 2022-05-16 RX ADMIN — SODIUM CHLORIDE, POTASSIUM CHLORIDE, SODIUM LACTATE AND CALCIUM CHLORIDE: 600; 310; 30; 20 INJECTION, SOLUTION INTRAVENOUS at 08:51

## 2022-05-16 RX ADMIN — SENNOSIDES AND DOCUSATE SODIUM 1 TABLET: 50; 8.6 TABLET ORAL at 21:00

## 2022-05-16 RX ADMIN — SODIUM CHLORIDE, POTASSIUM CHLORIDE, SODIUM LACTATE AND CALCIUM CHLORIDE 1000 ML: 600; 310; 30; 20 INJECTION, SOLUTION INTRAVENOUS at 07:57

## 2022-05-16 RX ADMIN — ACETAMINOPHEN 975 MG: 325 TABLET ORAL at 08:33

## 2022-05-16 RX ADMIN — MORPHINE SULFATE 0.15 MG: 1 INJECTION EPIDURAL; INTRATHECAL; INTRAVENOUS at 09:00

## 2022-05-16 RX ADMIN — HYDROMORPHONE HYDROCHLORIDE 4 MG: 2 TABLET ORAL at 12:52

## 2022-05-16 RX ADMIN — FENTANYL CITRATE 15 MCG: 50 INJECTION, SOLUTION INTRAMUSCULAR; INTRAVENOUS at 09:00

## 2022-05-16 RX ADMIN — HYDROCODONE BITARTRATE AND ACETAMINOPHEN 2 TABLET: 5; 325 TABLET ORAL at 17:02

## 2022-05-16 RX ADMIN — OXYTOCIN-SODIUM CHLORIDE 0.9% IV SOLN 30 UNIT/500ML 300 ML/HR: 30-0.9/5 SOLUTION at 09:39

## 2022-05-16 RX ADMIN — SIMETHICONE 80 MG: 80 TABLET, CHEWABLE ORAL at 13:12

## 2022-05-16 RX ADMIN — KETOROLAC TROMETHAMINE 30 MG: 30 INJECTION, SOLUTION INTRAMUSCULAR at 17:34

## 2022-05-16 RX ADMIN — Medication 50 MCG/MIN: at 09:04

## 2022-05-16 RX ADMIN — BUPIVACAINE HYDROCHLORIDE IN DEXTROSE 2 ML: 7.5 INJECTION, SOLUTION SUBARACHNOID at 09:00

## 2022-05-16 RX ADMIN — BUPIVACAINE 20 ML: 13.3 INJECTION, SUSPENSION, LIPOSOMAL INFILTRATION at 11:00

## 2022-05-16 RX ADMIN — TRANEXAMIC ACID 1 G: 10 INJECTION, SOLUTION INTRAVENOUS at 09:48

## 2022-05-16 RX ADMIN — ONDANSETRON 4 MG: 2 INJECTION INTRAMUSCULAR; INTRAVENOUS at 09:43

## 2022-05-16 ASSESSMENT — ACTIVITIES OF DAILY LIVING (ADL)
NUMBER_OF_TIMES_PATIENT_HAS_FALLEN_WITHIN_LAST_SIX_MONTHS: 1
FALL_HISTORY_WITHIN_LAST_SIX_MONTHS: YES
CHANGE_IN_FUNCTIONAL_STATUS_SINCE_ONSET_OF_CURRENT_ILLNESS/INJURY: NO
TOILETING_ISSUES: NO
WALKING_OR_CLIMBING_STAIRS_DIFFICULTY: NO
CONCENTRATING,_REMEMBERING_OR_MAKING_DECISIONS_DIFFICULTY: NO
WEAR_GLASSES_OR_BLIND: NO
DOING_ERRANDS_INDEPENDENTLY_DIFFICULTY: NO
DRESSING/BATHING_DIFFICULTY: NO
DIFFICULTY_EATING/SWALLOWING: NO

## 2022-05-16 ASSESSMENT — ENCOUNTER SYMPTOMS: SEIZURES: 0

## 2022-05-16 ASSESSMENT — LIFESTYLE VARIABLES: TOBACCO_USE: 0

## 2022-05-16 NOTE — CARE PLAN
Anjelicajs Holloway presents for scheduled repeat  section. Gestational Age: 36w3d  . Patient denies contractions, bleeding or LOF.     Dr Zurita notified of patient's arrival and condition.    NST: Reactive.    Plan:  - section at 0830.

## 2022-05-16 NOTE — H&P
[facility] OB HISTORY AND PHYSICAL    Patient: Sidney Holloway   MRN#: [id number]  YOB: 1995     HPI: Sidney Holloway is a 26 year old  at 36w3d by LMP c/w 9w4d US who presents today for scheduled repeat  section.    She reports good fetal movement. Denies LOF, vaginal bleeding, or contractions.      She denies fever, chills, headache, vision changes, SOB, chest pain, palpitations, nausea, emesis, RUQ pain, epigastric pain, dysuria, change in vaginal discharge, LE swelling/tenderness.     Pregnancy notable for:   EDS vascular type   Nonatherosclerotic PAD  Hx LTCS x2, HTCS x1  Chronic pain  Anxiety  Anemia   COVID pos     OBGYN History:    s/p CS x3  OB History    Para Term  AB Living   4 3 3 0 0 3   SAB IAB Ectopic Multiple Live Births   0 0 0 0 3      # Outcome Date GA Lbr Lewis/2nd Weight Sex Delivery Anes PTL Lv   4 Current            3 Term 19 38w0d  3.26 kg (7 lb 3 oz) M CS-LTranv  N KEYON      Name: SARAHMALE-SIDNEY      Apgar1: 4  Apgar5: 7   2 Term 18 38w0d  3.49 kg (7 lb 11.1 oz) F CS-LTranv Spinal N KEYON      Name: [email or web address] SIDNEY      Apgar1: 9  Apgar5: 9   1 Term 05/20/15 38w0d  3.204 kg (7 lb 1 oz) F CS-LTranv  N KEYON      Apgar1: 9  Apgar5: 9      Prenatal Lab Results:  Lab Results   Component Value Date    ABO A 2019    RH Pos 2019    AS Negative 2022    HEPBANG Nonreactive 2022    CHPCRT Negative 2022    GCPCRT Negative 2022    TREPAB Negative 2018    RUBELLAABIGG positive 2014    HGB 10.2 (L) 2022     GBS Status:   Lab Results   Component Value Date    GBS Negative 2019     Patient Active Problem List    Diagnosis Date Noted     S/P  section 2015     Priority: Medium     Other current maternal conditions classifiable elsewhere, antepartum 2015     Priority: Medium     Ehler's-Danlos syndrome       Ehler's-Danlos syndrome 2013     Priority: Medium      Problem list name updated by automated process. Provider to review and confirm       Vascular abnormality 2013     Priority: Medium     PAD (peripheral artery disease) (H) 2012     Priority: Medium     Non-atherosclerotic PAD due to unknown arterial occlusive disease, presumed Bill-Danlos syndrome, with superimposed vasospasm       History of Meckel's diverticulum 2012     Priority: Medium     S/P cholecystectomy 2012     Priority: Medium     Attention deficit disorder of childhood 2012     Priority: Medium     Corpus albicans cyst of ovary 2012     Priority: Medium       Past Medical History  Past Medical History:   Diagnosis Date     ADHD (attention deficit hyperactivity disorder)      Anxiety      Ehler's-Danlos syndrome 2013     Problem list name updated by automated process. Provider to review and confirm     Headaches      Joint hyperextensibility of multiple sites     suspect Ehler-Danlos but genetics testing not conclusive      Meckel's diverticulum     rupture of Meckel's diverticulum s/p bowel resection, approx 4 inches removed     PAD (peripheral artery disease) (H) 2012    Non-atherosclerotic PAD due to unknown arterial occlusive disease, presumed Bill-Danlos syndrome, with superimposed vasospasm      Peptic ulcer disease     secondary to prolonged NSAIDs use for headache     POTS (postural orthostatic tachycardia syndrome)      S/P  2019     Vascular abnormality 2013     Vascular anomalies, 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, and hypoplastic right posterior tibular arteries     Past Surgical History  Past Surgical History:   Procedure Laterality Date     ------------OTHER-------------  rupture Meckel diverticulum with removal of necroic bowel    Removal of      APPENDECTOMY        SECTION N/A 2015    Procedure:  SECTION;  Surgeon: Zulma Chavez,  MD;  Location: [facility] L+D      SECTION N/A 2018    Procedure:  SECTION;  Repeat  Section ;  Surgeon: Jayne Smith MD;  Location: [facility] L+D      SECTION N/A 2019    Procedure: Repeat  Section;  Surgeon: Little Fishman MD;  Location: [facility] L+D     cesearan section      x2 2018     CHOLECYSTECTOMY       OTHER SURGICAL HISTORY      Bowel resection for ruptured meckel's diverticulum     Family History  Family History   Problem Relation Age of Onset     Lung Cancer Maternal Grandmother      Cerebrovascular Disease Paternal Aunt      Social History  Social History     Tobacco Use     Smoking status: Current Every Day Smoker     Packs/day: 0.50     Types: Cigarettes     Smokeless tobacco: Never Used     Tobacco comment: Smoker prior to pregnancy   Substance Use Topics     Alcohol use: No     Comment: (prior to pregnancy)     Medications  Medications Prior to Admission   Medication Sig Dispense Refill Last Dose     amLODIPine (NORVASC) 5 MG tablet Take 1 tablet (5 mg) by mouth 2 times daily 30 tablet 3 More than a month at Unknown time     HYDROcodone-acetaminophen (NORCO) 5-325 MG tablet TAKE ONE OR TWO TABLETS BY MOUTH EVERY SIX HOURS AS NEEDED FOR PAIN   Unknown at Unknown time     levonorgestrel-ethinyl estradiol (AVIANE) 0.1-20 MG-MCG tablet Take 1 tablet by mouth daily   Unknown at Unknown time     methylphenidate (RITALIN LA) 10 MG 24 hr capsule Take 30 mg by mouth 2 tabs of 10mg in A.M and 1  10mg in P.M   More than a month at Unknown time     PARoxetine (PAXIL) 10 MG tablet Take 1 tablet (10 mg) by mouth every morning 60 tablet 1 More than a month at Unknown time     sildenafil (REVATIO) 20 MG tablet Take 40 mg in the am, 20 mg in the afternoon and pm (three times total in a day) 270 tablet 3 More than a month at Unknown time     Allergies  Allergies   Allergen Reactions     Ibuprofen Other (See Comments)     all NASAIDS -  Ulcers     Nsaids GI  "Disturbance        REVIEW OF SYSTEMS:  A 10 point review of systems was completed and was negative other than as noted in the HPI.    PHYSICAL EXAM  /59   Temp 97.6  F (36.4  C) (Oral)   Resp 16   Ht 1.715 m (5' 7.5\")   Wt 74.8 kg (165 lb)   LMP 2021   BMI 25.46 kg/m    Gen: NAD  CV: RRR, nl S1/S2, no murmurs/clicks/gallops  Lungs: CTAB, non-labored breathing  Abd: Gravid, non-tender, non-distended  Ext: No peripheral extremity edema    Membranes: Intact  Estimated Fetal Weight: 87%ile by  Maryjane    FHT:  Monitoring External  FHT: Baseline 135 bpm; moderate  variability; +accels; no decelerations  TOCO 2 contractions in 10 minutes    Studies:    Latest Reference Range & Units 22 11:39 22 12:08   WBC 4.0 - 11.0 10e3/uL  10.8   Hemoglobin 11.7 - 15.7 g/dL  10.2 (L)   Hematocrit 35.0 - 47.0 %  31.3 (L)   Platelet Count 150 - 450 10e3/uL  280   RBC Count 3.80 - 5.20 10e6/uL  3.26 (L)   MCV 78 - 100 fL  96   MCH 26.5 - 33.0 pg  31.3   MCHC 31.5 - 36.5 g/dL  32.6   RDW 10.0 - 15.0 %  13.2   ABO/Rh(D)   A POS   Antibody Screen Negative   Negative   SPECIMEN EXPIRATION DATE      Treponema Antibodies Nonreactive   Nonreactive   SARS CoV2 PCR Negative, Testing sent to reference lab. Results will be returned via unsolicited result  Positive ! [1]      Assessment & Plan: 26 year old  at 36w3d by LMP c/w 9w3d US admitted for scheduled repeat  section. Pregnancy is notable for EDS type III, PAD, BLE vascular malformations, anxiety, hx CS x3, chronic pain, anemia, COVID pos.       # Scheduled Repeat  Section  Indicated due to hx LTCS x2, HTCS x1. Previous op notes reported \"Dense rectofascial adhesions. Dense adhesions of the upper 1/3 of the anterior uterus to the anterior abdominal wall. Defect in the anterior uterus made with lysis of adhesions. Lower uterine segment unable to be identified due to adhesive disease, high transverse hysterotomy made in the active " "segment of the uterus. Recommend future deliveries between 36-37 weeks. Advised against subsequent pregnancy due to level of scarring.\" Therefore plan for a vertical midline skin incision with classical hysterotomy. Dorsal lithotomy position with feet in Josue stirrups to facilitate cystoscopy if necessary.   - Labs:   - Pre-op Hgb 10.2, Plts 280   - Will Type and Cross patient for 2 units of blood due to anemia, hx dense adhesions  - Placenta: Anterior  - Anesthesia: Spinal   - Abx ppx: 2g Ancef   - PPH Meds/Ppx: Standard meds  - Diet: NPO  - Ppx: SCDs  - Consent: Discussed risks and benefits of procedure, including but not limited to bleeding, infection, injury to surrounding organs (vagina, cervix, uterus, ovaries, fallopian tubes, bowel, bladder, ureters, blood vessels and nerves of the pelvis), injury to infant, and the potential need for another surgery should an intraoperative injury go unrecognized or if patient were to have continued bleeding. Patient had time to ask questions and expressed understanding of procedure and associated risks. Agreed to blood transfusion if necessary. Consent form signed.    # EDS Type III  # Nonatherosclerotic PAD  - Asymptomatic   - Continue PTA Sildenafil   - Follows with Dr. Gutierrez     # PNC  - Rh pos, Rubella non-immune - MMR postpartum, GCT passed, GBS neg  - Imaging: placenta anterior  - s/p flu, Tdap, COVID x3 vaccines  - Contraception: Undecided. Declines bilateral tubal ligation/permanenst sterilization.      # FWB:   Cat I tracing, reactive  - Continuous Fetal Monitoring  - NICU for delivery   - Intrauterine resuscitative measures prn    # PPH Risk:  - High (dense adhesions, anemia, CS)- IV, T&C x2U    Patient discussed with Dr. Luis Zurtia MD  OB/GYN Resident, PGY-3  5/16/2022 8:03 AM    The patient was seen and evaluated by me separately from the team.  I have reviewed and agree with the above note.  Reviewed plan for vertical skin incision, in " lithotomy in case of need for cystoscopy, blood available.  She declines sterilization even if it does not add any risk/minimal time to the procedure.      Jayne Smith MD, FACOG

## 2022-05-16 NOTE — PROGRESS NOTES
Patient arrived to [facility] unit via zoom cart at 1245,with belongings, accompanied by spouse/ significant other, with infant in arms. Received report from Rohit WREN RN and checked bands. Unit and room orientation completd. Call light given; no concerns present at this time. Continue with plan of care.

## 2022-05-16 NOTE — OR NURSING
Data: Pt to OB PACU at 1118 via cart. PIV infusing without complications, presley with clear, yellow urine to gravity, pt denies any pain, denies any nausea and vomiting.  Interventions: IV to pump, monitors and alarms on, SCD on.  Response: stable.  Plan: Patient instructed to notify RN for pain or nausea, routine post op cares.

## 2022-05-16 NOTE — ANESTHESIA POSTPROCEDURE EVALUATION
"Patient: Anjelica Holloway    Procedure: Procedure(s):   SECTION       Anesthesia Type:  Spinal    Note:  Disposition: Inpatient   Postop Pain Control: Uneventful            Sign Out: Well controlled pain   PONV: No   Neuro/Psych: Uneventful            Sign Out: Acceptable/Baseline neuro status   Airway/Respiratory: Uneventful            Sign Out: Acceptable/Baseline resp. status   CV/Hemodynamics: Uneventful            Sign Out: Acceptable CV status   Other NRE: NONE   DID A NON-ROUTINE EVENT OCCUR? No           Last vitals:  Vitals Value Taken Time   /57 22 1232   Temp     Pulse     Resp 16 22 1230   SpO2 98 % 22 1230     Patient Vitals for the past 24 hrs:   BP Temp Temp src Pulse Resp SpO2 Height Weight   22 1305 99/60 36.4  C (97.5  F) Oral 73 16 98 % -- --   22 1230 114/57 -- -- -- 16 98 % -- --   22 1224 115/59 -- -- -- 16 -- -- --   22 1215 109/55 -- -- -- 16 98 % -- --   22 1200 91/55 -- -- -- 16 98 % -- --   22 1145 109/57 -- -- -- 16 97 % -- --   22 1130 -- -- -- -- 16 -- -- --   22 1118 105/58 36.1  C (97  F) Oral 68 16 -- -- --   22 0646 119/59 36.4  C (97.6  F) Oral -- 16 -- 1.715 m (5' 7.5\") 74.8 kg (165 lb)         Electronically Signed By: Dalia Hammond MD  May 16, 2022  1:33 PM  "

## 2022-05-16 NOTE — OR NURSING
Data: Anjelica Holloway transferred to [id number] via cart at 1245. Baby transferred via parent's arms.  Action: Receiving unit notified of transfer: Yes. Patient and family notified of room change. Report given to BRANDON Murrieta at 1245. Belongings sent to receiving unit. Accompanied by Registered Nurse. Oriented patient to surroundings. Call light within reach. ID bands double-checked with receiving RN.  Response: Patient tolerated transfer and is stable.

## 2022-05-16 NOTE — ANESTHESIA CARE TRANSFER NOTE
Patient: Anjelica Holloway    Procedure: Procedure(s):   SECTION       Diagnosis: Previous  delivery, antepartum condition or complication [O34.219]  Diagnosis Additional Information: No value filed.    Anesthesia Type:   Spinal     Note:    Oropharynx: spontaneously breathing  Level of Consciousness: awake  Oxygen Supplementation: room air    Independent Airway: airway patency satisfactory and stable  Dentition: dentition unchanged  Vital Signs Stable: post-procedure vital signs reviewed and stable  Report to RN Given: handoff report given  Patient transferred to: PACU    Handoff Report: Identifed the Patient, Identified the Reponsible Provider, Reviewed the pertinent medical history, Discussed the surgical course, Reviewed Intra-OP anesthesia mangement and issues during anesthesia, Set expectations for post-procedure period and Allowed opportunity for questions and acknowledgement of understanding      Vitals:  Vitals Value Taken Time   BP     Temp     Pulse     Resp     SpO2         Electronically Signed By: Bimal Rahman Junior, MD  May 16, 2022  11:24 AM

## 2022-05-16 NOTE — BRIEF OP NOTE
[facility]   Section Brief Operative Note     Surgery Date: 2022    Surgeon:  Jayne Smith MD    Assistants:  Jb Zurita MD, PGY-3    Thu Busch MD    Pre-op Diagnosis:    - , IUP at 36w3d  - EDS Type III  - PAD  - Hx CS x3  - Anxiety  - COVID pos     Post-op Diagnosis:    - Same as above  - Liveborn female infant     Procedure: Repeat high transverse section with triple layer uterine closure via vertical midline incision    Anesthesia: Spinal epidural    EBL:  534 ml    Drains: Costa catheter     Specimens: Cord blood  Complications: None apparent    Findings:   - Dense fascial adhesions.  Dense adhesions bands from the uterus to the left pelvic sidewall and to the anderior abdominal wall. Bladder densely adherent to the lower uterine segment.   - Normal right ovary and fallopian tube. Left fallopian tube not visualized. Left ovary palpated and normal, but not visualized.   - Clear amniotic fluid  - Liveborn female infant in cephalic presentation at 0928 on 2022. Apgars 8 at 1 minute & 9 at 5 minutes. Weight 3175g.    Disposition: Transferred in stable condition to FRANCHESCA Zurita MD  Ob/Gyn Resident, PGY-3  2022 11:03 AM

## 2022-05-16 NOTE — PROVIDER NOTIFICATION
05/16/22 1637   Provider Notification   Provider Name/Title G2; Alen   Method of Notification Electronic Page   Notification Reason Medication Request  (Toradol & [place])     Pt can't do oral NSAIDS, but tolerates Toradol. Can you order it? states dilaudid not working; used NORCO in the past. ok to order that too? pain 8/10

## 2022-05-16 NOTE — OP NOTE
[facility]  Operative Note     Date of Surgery: 2022    Patient: Anjelica Holloway  MRN: [id number]  : 1995    Surgeon:  Jayne Smith MD    Assistants:  Jb Zurita MD PGY-3    Thu Busch MD      Pre-operative Diagnosis:    - , IUP at 36w3d  - History of  section x3 with known extensive pelvic adhesive disease  - COVID positive  -  Bill-Danlos Syndrome- Vascular Type  - Nonatherosclerotic peripheral arterial disease  - Anxiety  - Chronic pain    Post-operative Diagnosis:   - Same as above, now  s/p delivery by procedure below  - Liveborn female infant     Procedure:  Repeat high transverse  section with triple layer uterine closure via vertical midline incision    Anesthesia: Spinal-Epidural    QBL:  534 mL    Drains: Costa Catheter     Specimens: Cord blood    Complications: None apparent     Indications:   Anjelica Holloway is a 26 year old  at 36w3d admitted for scheduled repeat  section indicated due to history of high transverse  section and prior uterine window. Due to history of dense adhesions at the time of her last  section, vertical midline skin incision was recommended. The risks, benefits, and alternatives of  section were discussed with the patient. She desired to proceed. Consent form signed.     Findings:   - Dense fascial adhesions.  Dense adhesions bands from the uterus to the left pelvic sidewall and to the anderior abdominal wall. Bladder densely adherent to the lower uterine segment and mid uterus. Omental adhesions to the upper uterus and anterior abdominal wall.   - Normal right ovary and fallopian tube. Left fallopian tube not visualized. Left ovary palpated to be of normal size and without masses, but not visualized.   - Clear amniotic fluid  - Liveborn female infant in cephalic presentation at 0928 on 2022. Apgars 8 at 1 minute & 9 at 5 minutes. Weight  3175g.    Procedure Details:   The patient was brought to the OR, where adequate spinal-epidural anesthesia was administered with SCDs in place for DVT prophylaxis.  A presley catheter was placed into her bladder.  She was placed in the dorsal lithotomy position with her feet in Josue stirrups and with a slight leftward tilt. Ancef was administered for perioperative antimicrobial prophylaxis. She was prepped and draped in the usual sterile fashion. A surgical time out was performed.     A vertical midline skin incision was made with the scalpel and carried down to the underlying fascia with electrosurgical dissection. The fascia was opened sharply with the scalpel at the upper margin of the incision. The fascial incision was then extended inferiorly the length of the incision with electrosurgical and sharp dissection. The peritoneum was elevated between two Adriana clamps and entered bluntly. Omental adhesions to the anterior uterus were lysed with electrosurgical dissection. Dense adhesion from the uterus to the right anterior rectus muscle and fascia was divided with electrosurgical and sharp dissection. The bladder blade was placed. Dense adhesions from the bladder to the uterus were taken down sharply with Metzenbaum scissors.  The bladder blade was replaced. Exposure was deemed adequate to enable delivery of the infant. The lower uterine segment was inaccessible due to dense adhesions.     A high transverse hysterotomy was made with the scalpel and the incision was extended with digital pressure. The infant was noted to be in cephalic position. The vertex was elevated to the level of the hysterotomy and  the infant was delivered atraumatically.  No nuchal cord was noted. The cord was doubly clamped and cut after 60 seconds, and the infant was handed off to the awaiting NICU staff. A segment of cord was cut cord. The placenta was delivered with gentle traction on the umbilical cord and uterine massage.     The  uterus was unable to be exteriorized due to dense left sided adhesion to the pelvic side wall and anterior abdominal wall. The hysterotomy was examined and extended obliquely from the mid uterus on the left to near the insertion of the right round ligament's insertion at the right uterine cornua. The uterus was cleared of all clots and debris. Uterine tone was noted to be adequate with pitocin given through the running IV and uterine massage.  The hysterotomy was closed with two layers of running locked fashion with 0 Vicryl suture. A third layer of running locked 0 Monocryl suture was placed to improve hemostasis. Three additional figure of eight stitches of 0 Vicryl were placed to control ongoing oozing at the hysterotomy. IV TXA was administered. Additional areas of serosal oozing from lysis of adhesions were rendered hemostatic with electrocautery. Adequate hemostasis was obtained. Seprafilm was placed over the hysterotomy.     The posterior cul-de-sac was cleared of all clots and debris.The hysterotomy was reexamined and noted to be hemostatic. The fascia and rectus muscles were examined and areas of oozing were controlled with electrocautery and figure of eight stitches of 3-0 Vicryl suture.     The fascia was closed in simple running fashion with 0 looped PDS suture. The subcutaneous tissue was irrigated and areas of oozing were controlled with electrocautery. The subcutaneous tissue was closed in running fashion with 3-0 Vicryl suture. Five simple interrupted subdermal stitches were placed to approximate the dermis along the length of the incision. The skin was closed in running subcuticular fashion with 4-0 Monocryl suture and Exofin glue.. The incision was covered with a sterile dressing.    All sponge, needle, and instrument counts were correct x2. The patient tolerated the procedure well, and was transferred to recovery in stable condition. Dr. Smith was present and scrubbed for the procedure.     The  assistance of Dr. Busch was necessary due to the anticipated complexity of the case with known history of extensive dense fascial and intraabdominal adhesions.     Jb Zurita MD  Ob/Gyn Resident, PGY-3  05/16/22 10:59 AM    I was scrubbed and present for the entire procedure.  I have reviewed and edited the above note.  Dr. Busch's presence was required from the start of the procedure through closure of the fascia given the significant adhesive diease for assistance with retraction and lysis of adhesions.      Jayne Smith MD, FACOG

## 2022-05-16 NOTE — ANESTHESIA PROCEDURE NOTES
TAP Procedure Note    Pre-Procedure   Staff -        Anesthesiologist:  Dalia Hammond MD       Resident/Fellow: Goldberg, Leslie, MD       Performed By: resident       Location: OR       Pre-Anesthestic Checklist: patient identified, IV checked, site marked, risks and benefits discussed, informed consent, monitors and equipment checked, pre-op evaluation, at physician/surgeon's request and post-op pain management  Timeout:       Correct Patient: Yes        Correct Procedure: Yes        Correct Site: Yes        Correct Position: Yes        Correct Laterality: Yes        Site Marked: Yes  Procedure Documentation  Procedure: TAP       Laterality: bilateral       Patient Position: supine       Skin prep: Chloraprep       Insertion Site: T8-9.       Needle Gauge: 20.        Needle Length (Inches): 2        Ultrasound guided       1. Ultrasound was used to identify targeted nerve, plexus, vascular marker, or fascial plane and place a needle adjacent to it in real-time.       2. Ultrasound was used to visualize the spread of anesthetic in close proximity to the above referenced structure.    Assessment/Narrative         The placement was negative for: blood aspirated, painful injection and site bleeding       Paresthesias: No.       Insertion/Infusion Method: Single Shot       Complications: none    Medication(s) Administered   Bupivacaine 0.25% PF (Infiltration) - Infiltration   20 mL - 5/16/2022 11:00:00 AM  Bupivacaine liposome (Exparel) 1.3% LA inj susp (Infiltration) - Infiltration   20 mL - 5/16/2022 11:00:00 AM

## 2022-05-16 NOTE — DISCHARGE SUMMARY
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  - Chronic pain  - Anxiety  - Anemia  - COVID positive on , asymptomatic            Discharge Diagnosis:     Same, now , delivered   Acute blood loss anemia          Procedures:     Procedure(s): - Repeat high transverse  section with triple layer closure via vertical midline skin incision  - Spinal epidural anesthesia                Medications Prior to Admission:     Medications Prior to Admission   Medication Sig Dispense Refill Last Dose     PARoxetine (PAXIL) 10 MG tablet Take 5 mg by mouth every evening        sildenafil (REVATIO) 20 MG tablet Take 20 mg by mouth 2 times daily        [DISCONTINUED] amLODIPine (NORVASC) 5 MG tablet Take 1 tablet (5 mg) by mouth 2 times daily 30 tablet 3 More than a month at Unknown time     [DISCONTINUED] HYDROcodone-acetaminophen (NORCO) 5-325 MG tablet TAKE ONE OR TWO TABLETS BY MOUTH EVERY SIX HOURS AS NEEDED FOR PAIN   Unknown at Unknown time     [DISCONTINUED] levonorgestrel-ethinyl estradiol (AVIANE) 0.1-20 MG-MCG tablet Take 1 tablet by mouth daily   Unknown at Unknown time     [DISCONTINUED] methylphenidate (RITALIN LA) 10 MG 24 hr capsule Take 30 mg by mouth 2 tabs of 10mg in A.M and 1  10mg in P.M   More than a month at Unknown time             Discharge Medications:        Review of your medicines      START taking      Dose / Directions   ferrous sulfate 325 (65 Fe) MG EC tablet  Commonly known as: FE TABS  Used for: S/P  section      Dose: 325  mg  Take 1 tablet (325 mg) by mouth daily  Quantity: 90 tablet  Refills: 0     ibuprofen 600 MG tablet  Commonly known as: ADVIL/MOTRIN  Used for: S/P  section      Dose: 600 mg  Take 1 tablet (600 mg) by mouth every 6 hours as needed for moderate pain Start after delivery  Quantity: 60 tablet  Refills: 0     senna-docusate 8.6-50 MG tablet  Commonly known as: SENOKOT-S/PERICOLACE  Used for: S/P  section      Dose: 1 tablet  Take 1 tablet by mouth daily Start after delivery.  Quantity: 100 tablet  Refills: 0        CONTINUE these medicines which have NOT CHANGED      Dose / Directions   PARoxetine 10 MG tablet  Commonly known as: PAXIL      Dose: 5 mg  Take 5 mg by mouth every evening  Refills: 0     sildenafil 20 MG tablet  Commonly known as: REVATIO      Dose: 20 mg  Take 20 mg by mouth 2 times daily  Refills: 0        STOP taking    amLODIPine 5 MG tablet  Commonly known as: NORVASC        HYDROcodone-acetaminophen 5-325 MG tablet  Commonly known as: NORCO        levonorgestrel-ethinyl estradiol 0.1-20 MG-MCG tablet  Commonly known as: AVIANE        methylphenidate 10 MG 24 hr capsule  Commonly known as: RITALIN LA              Where to get your medicines      These medications were sent to [facility] [address]  [address] [address]    Phone: [phone]     ferrous sulfate 325 (65 Fe) MG EC tablet    ibuprofen 600 MG tablet    senna-docusate 8.6-50 MG tablet                 Consultations:   Anesthesia          Brief Admission History:   Ms. Anjelica Holloway is a 26 year old now P4 who initially presented at 36w3d for scheduled repeat . Pregnancy was notable for EDS type III, PAD, BLE vascular malformations, anxiety, hx CS x3, chronic pain, anemia, COVID pos       Intraoperative course   The procedure was complicated by adhesions as noted in findings.   mL, she received TXA.  See operative report for details.      Operative findings:   - Dense fascial adhesions.  Dense adhesions bands from the uterus to the left pelvic sidewall and to the anderior abdominal wall. Bladder densely adherent to the lower uterine segment.   - Normal right ovary and fallopian tube. Left fallopian tube not visualized. Left ovary palpated and normal, but not visualized.   - Clear amniotic fluid  - Liveborn female infant in cephalic presentation at 0928 on 5/16/2022. Apgars 8 at 1 minute & 9 at 5 minutes. Weight 3175g       Postpartum Course   The patient's hospital course was unremarkable.  She recovered as anticipated and experienced no post-operative complications. On discharge, her pain was well controlled. Vaginal bleeding is similar to peak menstrual flow.  Voiding without difficulty.  Ambulating well, tolerating a normal diet, and has resumption of bowel function.  No fever or significant wound drainage.  Breastfeeding.  Infant is stable. She was discharged on post-partum day #3.    Post-partum hemoglobin:   Hemoglobin   Date Value Ref Range Status   05/17/2022 8.8 (L) 11.7 - 15.7 g/dL Final   07/02/2019 8.4 (L) 11.7 - 15.7 g/dL Final     Contraception: planning interval tubal ligation, FTP signed 3/31/2022  Rh positive, no Rhogam indicated  Rubella equivocal, MMR given PTD          Discharge Instructions and Follow-Up:     Discharge diet: Regular   Discharge activity: No lifting greater than 20 lbs, pushing, pulling, or other strenuous activity for 6 weeks. Pelvic rest for 6 weeks including no sexual intercourse, tampons, or douching. No driving until you can slam on the brakes without pain or while on narcotic pain medications.    Discharge follow-up: Follow up with primary OB for routine postpartum visit in 6 weeks   Wound care: Keep incision clean and dry           Discharge Disposition:     Discharged to home     Carlene Chopra MD  Obstetrics & Gynecology, PGY-2  05/19/2022 6:43 AM     I have seen, examined, and counseled the patient  on the day of discharge. I have reviewed and edited the summary.  Sushila Messer

## 2022-05-16 NOTE — PLAN OF CARE
Goal Outcome Evaluation:    Plan of Care Reviewed With: patient, spouse     Overall Patient Progress: improving    VSS. Fundus midline, firm, and U/U. Scant to light lochia. Incision approximated with liquid bandage and sutures, no drainage. Pain managed with tylenol and dilaudid, patient reports adequate pain control. Simethicone and hot pack to right shoulder for shoulder strap pain. Resting in bed so far, legs are starting to regain sensation. Tolerating regular diet without n/v. Costa in place with adequate urine output. Breastfeeding with minimal assist for positioning and latching and pumping after feedings. Bonding well with . Continue with plan of care.

## 2022-05-16 NOTE — ANESTHESIA PREPROCEDURE EVALUATION
Anesthesia Pre-Procedure Evaluation    Patient: Anjelica Holloway   MRN: [id number] : 1995        Procedure : Procedure(s):   SECTION, WITH POSTPARTUM TUBAL LIGATION          Past Medical History:   Diagnosis Date     ADHD (attention deficit hyperactivity disorder)      Anxiety      Ehler's-Danlos syndrome 2013     Problem list name updated by automated process. Provider to review and confirm     Headaches      Joint hyperextensibility of multiple sites     suspect Ehler-Danlos but genetics testing not conclusive      Meckel's diverticulum     rupture of Meckel's diverticulum s/p bowel resection, approx 4 inches removed     PAD (peripheral artery disease) (H) 2012    Non-atherosclerotic PAD due to unknown arterial occlusive disease, presumed Bill-Danlos syndrome, with superimposed vasospasm      Peptic ulcer disease     secondary to prolonged NSAIDs use for headache     POTS (postural orthostatic tachycardia syndrome)      S/P  2019     Vascular abnormality 2013     Vascular anomalies, 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, and hypoplastic right posterior tibular arteries      Past Surgical History:   Procedure Laterality Date     ------------OTHER-------------  rupture Meckel diverticulum with removal of necroic bowel    Removal of      APPENDECTOMY        SECTION N/A 2015    Procedure:  SECTION;  Surgeon: Zulma Chavez MD;  Location: [facility] L+[facility]      SECTION N/A 2018    Procedure:  SECTION;  Repeat  Section ;  Surgeon: Jayne Smith MD;  Location: [facility] L+[facility]      SECTION N/A 2019    Procedure: Repeat  Section;  Surgeon: Little Fishman MD;  Location: [facility] L+[facility]     cesearan section      x2 2018     CHOLECYSTECTOMY       OTHER SURGICAL HISTORY      Bowel resection for ruptured meckel's diverticulum      Allergies    Allergen Reactions     Ibuprofen Other (See Comments)     all NASAIDS -  Ulcers     Nsaids GI Disturbance      Social History     Tobacco Use     Smoking status: Current Every Day Smoker     Packs/day: 0.50     Types: Cigarettes     Smokeless tobacco: Never Used     Tobacco comment: Smoker prior to pregnancy   Substance Use Topics     Alcohol use: No     Comment: (prior to pregnancy)      Wt Readings from Last 1 Encounters:   22 75 kg (165 lb 4.8 oz)        Anesthesia Evaluation   Pt has had prior anesthetic. Type: Regional.        ROS/MED HX  ENT/Pulmonary:  - neg pulmonary ROS   (+) recent URI, COVID positive 22 asymptomatic:  (-) tobacco use and allergic rhinitis   Neurologic:  - neg neurologic ROS  (-) no seizures, no CVA and no TIA   Cardiovascular: Comment: Non-atherosclerotic Peripheral arterial disease.  POTS syndrome.    (+) -Peripheral Vascular Disease---- (-) KERN   METS/Exercise Tolerance:     Hematologic: Comments: LE erythema and swelling. Low suspicion for VTE.      Musculoskeletal: Comment: Bill-Danlos type III      GI/Hepatic:     (+) GERD,     Renal/Genitourinary:  - neg Renal ROS     Endo:  - neg endo ROS     Psychiatric/Substance Use: Comment: ADHD      Infectious Disease: Comment: COVID-19 positive 22      Malignancy:  - neg malignancy ROS     Other: Comment: . 36w3d.  Hx of c/s x3 with dense adhesions with need high transverse incision.     (+) Possibly pregnant, , H/O Chronic Pain,        Physical Exam    Airway        Mallampati: II   TM distance: > 3 FB   Neck ROM: full   Mouth opening: > 3 cm    Respiratory Devices and Support         Dental  no notable dental history         Cardiovascular   cardiovascular exam normal       Rhythm and rate: regular and normal     Pulmonary   pulmonary exam normal        breath sounds clear to auscultation           OUTSIDE LABS:  CBC:   CBC RESULTS: Recent Labs   Lab Test 22  1208 22  1248 22  1502 19  0635  07/01/19  0916   WBC 10.8 7.2 12.5*  --  11.4*   HGB 10.2* 9.7* 11.5* 8.4* 9.9*   HCT 31.3* 28.7* 34.1*  --  32.2*    211 279  --  304       Lab Results   Component Value Date    WBC 10.8 05/13/2022    WBC 7.2 03/31/2022    HGB 10.2 (L) 05/13/2022    HGB 9.7 (L) 03/31/2022    HCT 31.3 (L) 05/13/2022    HCT 28.7 (L) 03/31/2022     05/13/2022     03/31/2022     BMP:   Lab Results   Component Value Date     02/02/2013     02/01/2013    POTASSIUM 4.0 02/02/2013    POTASSIUM 4.0 02/01/2013    CHLORIDE 107 02/02/2013    CHLORIDE 103 02/01/2013    CO2 23 02/02/2013    CO2 26 02/01/2013    BUN 17 02/02/2013    BUN 13 02/01/2013    CR 0.36 (L) 02/08/2022    CR 0.64 02/02/2013    GLC 85 02/02/2013    GLC 76 02/01/2013     COAGS: No results found for: PTT, INR, FIBR  POC:   Lab Results   Component Value Date    HCG Negative 02/01/2013     HEPATIC:   Lab Results   Component Value Date    ALBUMIN 3.5 (L) 02/02/2013    PROTTOTAL 6.0 (L) 02/02/2013    ALT 13 02/02/2013    AST 17 02/02/2013    ALKPHOS 39 (L) 02/02/2013    BILITOTAL 0.5 02/02/2013     OTHER:   Lab Results   Component Value Date    ERICA 8.7 02/02/2013    TSH 1.52 01/15/2019       Anesthesia Plan    ASA Status:  3   NPO Status:  NPO Appropriate    Anesthesia Type: CSE.         Techniques and Equipment:     - Lines/Monitors: 2nd IV     Consents    Anesthesia Plan(s) and associated risks, benefits, and realistic alternatives discussed. Questions answered and patient/representative(s) expressed understanding.    - Discussed:     - Discussed with:  Patient      - Extended Intubation/Ventilatory Support Discussed: No.      - Patient is DNR/DNI Status: No    Use of blood products discussed: Yes.     - Discussed with: Patient.     - Consented: consented to blood products            Reason for refusal: other.     Postoperative Care    Pain management: intrathecal morphine, Neuraxial analgesia, IV analgesics, Peripheral nerve block (Single  Shot), Multi-modal analgesia.   PONV prophylaxis: Ondansetron (or other 5HT-3)     Comments:                Bimal Rahman Junior, MD     I have reviewed the chart with the resident.  I have examined the patient.  I agree with above exam, assessment, and plan as documented by the resident.    Dalia Hammond MD

## 2022-05-16 NOTE — ANESTHESIA PROCEDURE NOTES
Combined intrathecal/epidural Procedure Note    Pre-Procedure   Staff -        Anesthesiologist:  Dalia Hammond MD       Resident/Fellow: Bimal Milton Jr., MD       Performed By: resident       Location: OR       Pre-Anesthestic Checklist: patient identified, IV checked, risks and benefits discussed, informed consent, monitors and equipment checked, pre-op evaluation, at physician/surgeon's request and post-op pain management  Timeout:       Correct Patient: Yes        Correct Procedure: Yes        Correct Site: Yes        Correct Position: Yes   Procedure Documentation  Procedure: combined intrathecal/epidural       Patient Position: sitting       Patient Prep/Sterile Barriers: sterile gloves, mask, patient draped       Skin prep: Chloraprep       Local skin infiltrated with mL of 2% lidocaine.        Insertion Site: L3-4. (midline approach).       Technique: LORT saline        Needle Type: Touhy       Needle Gauge: 18.        Needle Length (Inches): 5        Spinal Needle Type: Pencan       Spinal Needle (gauge): 25        Spinal Needle Length (inches): 5       Catheter: 18 G.          Catheter threaded easily.         4 cm epidural space.         Threaded 8 cm at skin.         # of attempts: 1 and  # of redirects:  1    Assessment/Narrative         Paresthesias: No.       Insertion/Infusion Method: LORT saline       Aspiration negative for Heme or CSF via Epidural Catheter.       Sensory Level Left: T6.       Sensory Level Right: T6.CSF fluid removed: with Epidural needle - not with Epidural needle.    Medication(s) Administered   0.75% Hyperbaric Bupivacaine (Intrathecal) - Intrathecal   2 mL - 5/16/2022 9:00:00 AM  Fentanyl PF (Intrathecal) - Intrathecal   15 mcg - 5/16/2022 9:00:00 AM  Morphine PF 1 mg/mL (Intrathecal) - Intrathecal   0.15 mg - 5/16/2022 9:00:00 AM

## 2022-05-17 LAB
HGB BLD-MCNC: 8.8 G/DL (ref 11.7–15.7)
T PALLIDUM AB SER QL: NONREACTIVE

## 2022-05-17 PROCEDURE — 120N000002 HC R&B MED SURG/OB UMMC

## 2022-05-17 PROCEDURE — 36415 COLL VENOUS BLD VENIPUNCTURE: CPT | Performed by: STUDENT IN AN ORGANIZED HEALTH CARE EDUCATION/TRAINING PROGRAM

## 2022-05-17 PROCEDURE — 85018 HEMOGLOBIN: CPT | Performed by: STUDENT IN AN ORGANIZED HEALTH CARE EDUCATION/TRAINING PROGRAM

## 2022-05-17 PROCEDURE — 250N000011 HC RX IP 250 OP 636: Performed by: STUDENT IN AN ORGANIZED HEALTH CARE EDUCATION/TRAINING PROGRAM

## 2022-05-17 PROCEDURE — 250N000013 HC RX MED GY IP 250 OP 250 PS 637: Performed by: STUDENT IN AN ORGANIZED HEALTH CARE EDUCATION/TRAINING PROGRAM

## 2022-05-17 PROCEDURE — 90471 IMMUNIZATION ADMIN: CPT | Performed by: STUDENT IN AN ORGANIZED HEALTH CARE EDUCATION/TRAINING PROGRAM

## 2022-05-17 PROCEDURE — 90707 MMR VACCINE SC: CPT | Performed by: STUDENT IN AN ORGANIZED HEALTH CARE EDUCATION/TRAINING PROGRAM

## 2022-05-17 PROCEDURE — 250N000011 HC RX IP 250 OP 636: Performed by: OBSTETRICS & GYNECOLOGY

## 2022-05-17 PROCEDURE — 86780 TREPONEMA PALLIDUM: CPT | Performed by: STUDENT IN AN ORGANIZED HEALTH CARE EDUCATION/TRAINING PROGRAM

## 2022-05-17 PROCEDURE — 250N000013 HC RX MED GY IP 250 OP 250 PS 637: Performed by: OBSTETRICS & GYNECOLOGY

## 2022-05-17 RX ORDER — AMOXICILLIN 250 MG
1 CAPSULE ORAL DAILY
Qty: 100 TABLET | Refills: 0 | Status: SHIPPED | OUTPATIENT
Start: 2022-05-17 | End: 2022-09-15

## 2022-05-17 RX ORDER — FERROUS SULFATE 325(65) MG
325 TABLET, DELAYED RELEASE (ENTERIC COATED) ORAL DAILY
Qty: 90 TABLET | Refills: 0 | Status: SHIPPED | OUTPATIENT
Start: 2022-05-17 | End: 2022-09-15

## 2022-05-17 RX ORDER — LIDOCAINE 4 G/G
1 PATCH TOPICAL
Status: DISCONTINUED | OUTPATIENT
Start: 2022-05-17 | End: 2022-05-19 | Stop reason: HOSPADM

## 2022-05-17 RX ORDER — IBUPROFEN 600 MG/1
600 TABLET, FILM COATED ORAL EVERY 6 HOURS PRN
Qty: 60 TABLET | Refills: 0 | Status: SHIPPED | OUTPATIENT
Start: 2022-05-17 | End: 2022-09-15

## 2022-05-17 RX ADMIN — SENNOSIDES AND DOCUSATE SODIUM 1 TABLET: 50; 8.6 TABLET ORAL at 07:56

## 2022-05-17 RX ADMIN — KETOROLAC TROMETHAMINE 30 MG: 30 INJECTION, SOLUTION INTRAMUSCULAR at 04:57

## 2022-05-17 RX ADMIN — HYDROCODONE BITARTRATE AND ACETAMINOPHEN 2 TABLET: 5; 325 TABLET ORAL at 19:41

## 2022-05-17 RX ADMIN — SENNOSIDES AND DOCUSATE SODIUM 1 TABLET: 50; 8.6 TABLET ORAL at 19:41

## 2022-05-17 RX ADMIN — KETOROLAC TROMETHAMINE 30 MG: 30 INJECTION, SOLUTION INTRAMUSCULAR at 17:41

## 2022-05-17 RX ADMIN — KETOROLAC TROMETHAMINE 30 MG: 30 INJECTION, SOLUTION INTRAMUSCULAR at 23:38

## 2022-05-17 RX ADMIN — HYDROMORPHONE HYDROCHLORIDE 4 MG: 2 TABLET ORAL at 22:22

## 2022-05-17 RX ADMIN — SIMETHICONE 80 MG: 80 TABLET, CHEWABLE ORAL at 01:08

## 2022-05-17 RX ADMIN — HYDROCODONE BITARTRATE AND ACETAMINOPHEN 2 TABLET: 5; 325 TABLET ORAL at 14:59

## 2022-05-17 RX ADMIN — KETOROLAC TROMETHAMINE 30 MG: 30 INJECTION, SOLUTION INTRAMUSCULAR at 11:30

## 2022-05-17 RX ADMIN — Medication 5 MG: at 09:36

## 2022-05-17 RX ADMIN — HYDROCODONE BITARTRATE AND ACETAMINOPHEN 2 TABLET: 5; 325 TABLET ORAL at 01:08

## 2022-05-17 RX ADMIN — HYDROMORPHONE HYDROCHLORIDE 4 MG: 2 TABLET ORAL at 18:25

## 2022-05-17 RX ADMIN — ENOXAPARIN SODIUM 40 MG: 40 INJECTION SUBCUTANEOUS at 07:56

## 2022-05-17 RX ADMIN — SILDENAFIL 20 MG: 20 TABLET, FILM COATED ORAL at 19:41

## 2022-05-17 RX ADMIN — HYDROCODONE BITARTRATE AND ACETAMINOPHEN 2 TABLET: 5; 325 TABLET ORAL at 04:56

## 2022-05-17 RX ADMIN — MEASLES, MUMPS, AND RUBELLA VIRUS VACCINE LIVE 0.5 ML: 1000; 12500; 1000 INJECTION, POWDER, LYOPHILIZED, FOR SUSPENSION SUBCUTANEOUS at 11:30

## 2022-05-17 RX ADMIN — LIDOCAINE PATCH 4% 1 PATCH: 40 PATCH TOPICAL at 07:55

## 2022-05-17 RX ADMIN — HYDROCODONE BITARTRATE AND ACETAMINOPHEN 2 TABLET: 5; 325 TABLET ORAL at 10:04

## 2022-05-17 RX ADMIN — SIMETHICONE 80 MG: 80 TABLET, CHEWABLE ORAL at 14:11

## 2022-05-17 RX ADMIN — SILDENAFIL 20 MG: 20 TABLET, FILM COATED ORAL at 07:55

## 2022-05-17 RX ADMIN — HYDROMORPHONE HYDROCHLORIDE 4 MG: 2 TABLET ORAL at 14:11

## 2022-05-17 NOTE — PROGRESS NOTES
"[facility]   Postpartum Note    Name:  Anjelica Holloway  MRN: [id number]    S: Patient is doing well. Pain is overall well-controlled. Tolerating regular diet. Ambulating without dizziness. Spontaneously voiding, though having some difficulty with starting stream. Reports flatus. Lochia is normal.  Breastfeeding. Plans interval tubal for postpartum contraception.     O:   Patient Vitals for the past 24 hrs:   BP Temp Temp src Pulse Resp SpO2 Height Weight   05/17/22 0404 112/60 98.5  F (36.9  C) Oral 74 16 97 % -- --   05/16/22 2329 107/57 98.5  F (36.9  C) Oral 83 16 97 % -- --   05/16/22 1945 112/60 98.6  F (37  C) Oral 77 14 97 % -- --   05/16/22 1830 107/55 98.4  F (36.9  C) Oral 78 16 -- -- --   05/16/22 1734 100/66 -- -- 75 -- -- -- --   05/16/22 1530 111/55 98.7  F (37.1  C) Oral 82 16 -- -- --   05/16/22 1435 109/58 98.4  F (36.9  C) Oral 78 16 97 % -- --   05/16/22 1335 109/55 98.3  F (36.8  C) Oral 74 16 97 % -- --   05/16/22 1305 99/60 97.5  F (36.4  C) Oral 73 16 98 % -- --   05/16/22 1230 114/57 -- -- -- 16 98 % -- --   05/16/22 1224 115/59 -- -- -- 16 -- -- --   05/16/22 1215 109/55 -- -- -- 16 98 % -- --   05/16/22 1200 91/55 -- -- -- 16 98 % -- --   05/16/22 1145 109/57 -- -- -- 16 97 % -- --   05/16/22 1130 -- -- -- -- 16 -- -- --   05/16/22 1118 105/58 97  F (36.1  C) Oral 68 16 -- -- --   05/16/22 0646 119/59 97.6  F (36.4  C) Oral -- 16 -- 1.715 m (5' 7.5\") 74.8 kg (165 lb)     Gen:  Resting comfortably, NAD  CV:  Regular rate, well perfused  Pulm:  Breathing room air comfortably  Abd:  Soft, appropriately ttp, non-distended. Fundus firm and below umbilicus  Incision: well-approximated, no surrounding redness or erythema with exofinin place  Ext:  Non-tender, trace LE edema b/l    I/O last 3 completed shifts:  In: 900 [P.O.:100; I.V.:700; IV Piggyback:100]  Out: 1359 [Urine:825; Blood:534]    Hgb:   Hemoglobin   Date Value Ref Range Status   05/13/2022 10.2 (L) 11.7 - " 15.7 g/dL Final   2019 8.4 (L) 11.7 - 15.7 g/dL Final       Assessment/Plan:  26 year old  on POD#1 s/p RHTCS via VML.    Routine postpartum care:  Pain: Well-controlled with toradol> ibuprofen, norco, PO dilaudid, lidocaine patch  Hgb: 10.2>  (TXA)> AM Hgb pending. Patient is asymptomatic from acute blood loss anemia and vitals are reassuring. Will discharge home with PO iron if Hgb <10  Rh: pos  Imm:  Rubella equivocal - MMR ordered this morning, s/p COVID vaccine x3  Feed: Breast  BC: Interval tubal    COVID positive  - asymptomatic  - Lovenox prophylaxis recommended, no interactions between sildenafil and no adverse effects noted with Bill Danlos syndrome. Patient agreeable to taking this this morning.    EDS Type 3  Nonvascular PAD  - Continue PTA sildenafil    Dispo: Discharge on POD#2-3 pending postop goals.    Carlene Chopra MD  Obstetrics & Gynecology, PGY-2  2022 6:38 AM    Hemoglobin   Date Value Ref Range Status   2022 8.8 (L) 11.7 - 15.7 g/dL Final   2022 10.2 (L) 11.7 - 15.7 g/dL Final   2019 8.4 (L) 11.7 - 15.7 g/dL Final   2019 9.9 (L) 11.7 - 15.7 g/dL Final     Appreciate Dr. Chopra's note above, patient also seen and examined by me. Patient with acute blood loss anemia complicating chronic anemia.  She is asymptomatic.  Plan to discharge on oral iron.  I agree with the note above.   Little Hawk MD

## 2022-05-17 NOTE — LACTATION NOTE
"This note was copied from a baby's chart.  Consult for: Late  infant, first time breastfeeding though 4th baby history of low supply. Anjelica has tried with her first two, but they lost too much weight and had to change to formula. She did have period of engorgement with very full, firm breasts and leaking milk but was not enough, one of them lost \"almost two pounds.\"    History: Repeat  delivery @ 36w3d, AGA (84th percentile) infant @ 7# birthweight, 5.7% loss at 24 hours, serum bilirubin not drawn yet.  Maternal history of ADD used to take methylphenidate 20 mg AM and 10 mg PM but reports currently not taking, anxiety managed with Paxil 5 mg q evening, COVID positive 22 however not sure if she's had it for a month and still positive (she and spouse had stomach bug awhile ago now recovered) or if new infection and asymptomatic, Bill Danlos Type III with peripheral artery disease, takes sildenafil 20 mg BID, and associated chronic pain managed with Norco 5-325 mg tablets. Anjelica reports normally takes 1 tablet BID for her vascular pain, now taking 2 tablets every 4-5 hours for chronic plus post op pain.    Consult with Dr. Hawk via phone to get ok to discuss pain medications and breastfeeding with Anjelica prior to visit. Also talked with Dr. Espinoza to make plan for safe infant feedings then had joint visit with Dr. Espinoza and Anjelica to discuss options. Made plan together encouraging mom to continue taking pain medications as needed through her post op recovery, and pump to establish supply discarding milk for now. When she's able to manage pain with 5 mg dosing and <30 mg/24 hours, then connect with lactation consultant through her clinic for guidance and support going back to breastfeeding.     Education provided:  Discussed options for feeding methods, given length of time expected for supplements and increasing volumes, talked through and simulated demo how to do paced bottle feeds with a slow flow " nipple.  Taught hands on pumping techniques, encouraged pumping each time baby gets her bottle at least 8-10 times per 24 hours. Recommended using hospital grade rental pump and checking with insurance on coverage (could get from here or DME provider closer to home if they prefer, depending on whether they will return to [facility] for appointment later and ease of returning rental pump). [place] [facility] pump cleaning handout/recommendations and outpatient lactation resources. Anjelica will check with pediatric clinic to see who they use for lactation support, shares she is already in text contact with her WIC peer counselor& feels she will have great support from them as well.     Please demonstrate for Anjelica the difference between Initiate and Maintain functions of Symphony pump, how and when to switch (point out sticker on pump).   Plan: Plan to pump and dump for now so Anjelica can manage her pain well until surgical pain is receding and able to manage pain with <30 mg of Norco daily. Expecting return to average 10 mg per day, sometimes more depending on activity in the coming weeks, Anjelica discussed with provider this may be more manageable longer term. Encourage frequent skin to skin and milk expression to establish supply. Bottle feed baby on cue at least every 2-3 hours (8 to 12 times per day) increasing volumes as tolerated & feed to satiety. Hands on pumping &/or hand express at least 8-10 times in 24 hours to help establish supply. Follow up with outpatient lactation consultant within a week of discharge if concerns about supply or pumping, or when pain is manageable with safe range of daily hydrocodone for support with supply prn and bringing infant back to breastfeeding. Due to infant prematurity and current need for exclusive pumping to establish supply, recommend renting hospital grade breast pump at discharge.

## 2022-05-17 NOTE — PLAN OF CARE
VSS and postpartum assessments WDL; presley was removed at 1800, has not voided yet - due to void by 2200.  Otherwise up with some assistance, but once she is up, she has a steady gait; she ambulated around the room independently.  Bonding well with infant, Padmaja.  Breastfeeding on cue with assistance; supplementing with donor milk.  Pain managed with [place] & toradol.  PIV in right, flushing well.  Yeyo is present and very supportive.  Will continue with postpartum cares and education per plan of care.

## 2022-05-17 NOTE — PLAN OF CARE
Goal Outcome Evaluation:    Plan of Care Reviewed With: patient     Overall Patient Progress: improving    Outcome Evaluation: pain control might need adjustment      Problem: Plan of Care - These are the overarching goals to be used throughout the patient stay.    Goal: Patient-Specific Goal (Individualized)  Outcome: Ongoing, Progressing  Flowsheets (Taken 5/17/2022 0308)  Individualized Care Needs: would like to breast/pump/use donor milk for supp  Anxieties, Fears or Concerns: pain control  Patient-Specific Goals (Include Timeframe): pain control satisfactory by end of shift    Data: Vital signs within normal limits. Postpartum checks within normal limits - see flow record. Patient eating and drinking normally. Patient able to empty bladder independently volumes not satisfactory, pt needing coaching and enc to hydrate. Pt notes some increased pain with ambulation. No apparent signs of infection. Incision healing well, dermabond intact, incision LYNNETTE. Patient performing self cares and is able to care for infant. Needing help supplementing, pumping and finger feeding.   Action: Patient medicated during the shift for pain. See MAR. Patient reassessed within 1 hour after each medication and pain was improved - patient concerned about pain control with increase activity, pt prefers to be woken up for meds.  Patient education done about postpartum goal, expectations and cares. See flow record.  Response: Positive attachment behaviors observed with infant. Support persons Yeyo present.   Plan: Anticipate discharge on 5/48.

## 2022-05-17 NOTE — PLAN OF CARE
Goal Outcome Evaluation:    Plan of Care Reviewed With: patient     Overall Patient Progress: improving  Data: VSS, postpartum assessments WNL. She is voiding without difficulty, up ad angela, passing gas, eating and drinking normally. Incision appears to be healing well, lochia WNL, no s/s infection. Breastfeeding infant and pumping independently. Breastfeeding plan is changing due to the dosage of pain meds mom is currently taking. Taking Norco, toradol, dilaudid and has lidocaine patch in place for pain and using abdominal binder and heat packs on her shoulder. Reports adequate pain management.   Action: Education provided on plan of care  Response: Pt is agreeable with her plan of care. Positive attachment behaviors observed with infant. Support person,  Yeyo, present. Anticipate discharge per care plan.

## 2022-05-17 NOTE — PROVIDER NOTIFICATION
05/16/22 2115   Provider Notification   Provider Name/Title Dr. Chopra (G2)   Method of Notification Electronic Page   Request Evaluate-Remote   Notification Reason Other   Pt wants to speak with doctor about Lovenox injection that is scheduled for this evening. Could you call her in her room and speak with her? Thanks.     *Received call back from provider (Dr. Chopra) at 2255. Informed provider that patient has declined Lovenox this evening but is considering it for the AM. Asked provider if it was okay if I rescheduled Lovenox dose for AM and provider approved time adjustment. Provider will round in the AM if patient has further questions about Lovenox.

## 2022-05-17 NOTE — PROGRESS NOTES
"Post  Anesthesia Follow Up Note    Patient: Anjelica Holloway    Patient location: Postpartum floor.      Procedure(s) Performed:  Procedure(s):   SECTION    Anesthesia type: Combined spinal epidural    Subjective:     Pain is well controlled     Additional ROS:  She does not complain of pruritis at this time. She denies weakness, denies paresthesia, denies difficulties breathing or voiding, denies nausea or vomiting. She is able to ambulate and tolerates regular diet.    Objective:  Vitals stable      Last Vitals: /60 (BP Location: Right arm, Patient Position: Sitting, Cuff Size: Adult Regular)   Pulse 74   Temp 36.9  C (98.5  F) (Oral)   Resp 16   Ht 1.715 m (5' 7.5\")   Wt 74.8 kg (165 lb)   LMP 2021   SpO2 97%   Breastfeeding Unknown   BMI 25.46 kg/m      Assessment and plan:   Anjelica Holloway is a 26 year old female  POD # 1 s/p  ( under CSE)  and single shot TAP nerve block injections. There is no evidence of adverse side effects associated with spinal and nerve block injections.    Pain is well controlled.    Thank you for including us in the care for this patient.    Bimal Rahman Junior, MD   PGY2  Anesthesia          "

## 2022-05-17 NOTE — PLAN OF CARE
Data: Vital signs within normal limits. Postpartum checks within normal limits - see flow record. Patient denies any Covid symptoms, patient and spouse have been wearing their masks when nurse/provider present. Patient eating and drinking normally. Patient able to empty bladder independently and is up ambulating without dizziness. No apparent signs of infection. Incision healing well. Patient performing self cares and is able to care for infant. Breastfeeding and breast pumping for baby. Also discussed the benefits of hand expression and skin to skin to support a healthy breast milk supply.   Action: Pain has been adequately managed with oral medications. Abdominal binder is on for additional comfort and support.   Response: Positive attachment behaviors observed with infant. Support person, : Yeyo, present and attentive to patient and infant.    Plan: Continue with the plan of care.

## 2022-05-18 PROCEDURE — 250N000011 HC RX IP 250 OP 636: Performed by: STUDENT IN AN ORGANIZED HEALTH CARE EDUCATION/TRAINING PROGRAM

## 2022-05-18 PROCEDURE — 250N000011 HC RX IP 250 OP 636: Performed by: OBSTETRICS & GYNECOLOGY

## 2022-05-18 PROCEDURE — 250N000013 HC RX MED GY IP 250 OP 250 PS 637: Performed by: STUDENT IN AN ORGANIZED HEALTH CARE EDUCATION/TRAINING PROGRAM

## 2022-05-18 PROCEDURE — 250N000013 HC RX MED GY IP 250 OP 250 PS 637: Performed by: OBSTETRICS & GYNECOLOGY

## 2022-05-18 PROCEDURE — 120N000002 HC R&B MED SURG/OB UMMC

## 2022-05-18 RX ADMIN — HYDROCODONE BITARTRATE AND ACETAMINOPHEN 2 TABLET: 5; 325 TABLET ORAL at 17:14

## 2022-05-18 RX ADMIN — KETOROLAC TROMETHAMINE 30 MG: 30 INJECTION, SOLUTION INTRAMUSCULAR at 06:11

## 2022-05-18 RX ADMIN — HYDROCODONE BITARTRATE AND ACETAMINOPHEN 2 TABLET: 5; 325 TABLET ORAL at 21:24

## 2022-05-18 RX ADMIN — HYDROCODONE BITARTRATE AND ACETAMINOPHEN 2 TABLET: 5; 325 TABLET ORAL at 00:28

## 2022-05-18 RX ADMIN — SILDENAFIL 20 MG: 20 TABLET, FILM COATED ORAL at 20:32

## 2022-05-18 RX ADMIN — HYDROCODONE BITARTRATE AND ACETAMINOPHEN 2 TABLET: 5; 325 TABLET ORAL at 04:50

## 2022-05-18 RX ADMIN — SENNOSIDES AND DOCUSATE SODIUM 2 TABLET: 50; 8.6 TABLET ORAL at 20:33

## 2022-05-18 RX ADMIN — IBUPROFEN 800 MG: 800 TABLET, FILM COATED ORAL at 18:37

## 2022-05-18 RX ADMIN — LIDOCAINE PATCH 4% 1 PATCH: 40 PATCH TOPICAL at 08:17

## 2022-05-18 RX ADMIN — HYDROCODONE BITARTRATE AND ACETAMINOPHEN 2 TABLET: 5; 325 TABLET ORAL at 08:54

## 2022-05-18 RX ADMIN — Medication 5 MG: at 08:53

## 2022-05-18 RX ADMIN — HYDROMORPHONE HYDROCHLORIDE 4 MG: 2 TABLET ORAL at 02:44

## 2022-05-18 RX ADMIN — IBUPROFEN 800 MG: 800 TABLET, FILM COATED ORAL at 12:12

## 2022-05-18 RX ADMIN — HYDROCODONE BITARTRATE AND ACETAMINOPHEN 2 TABLET: 5; 325 TABLET ORAL at 13:02

## 2022-05-18 RX ADMIN — SILDENAFIL 20 MG: 20 TABLET, FILM COATED ORAL at 08:17

## 2022-05-18 RX ADMIN — HYDROMORPHONE HYDROCHLORIDE 4 MG: 2 TABLET ORAL at 08:17

## 2022-05-18 RX ADMIN — HYDROMORPHONE HYDROCHLORIDE 4 MG: 2 TABLET ORAL at 12:12

## 2022-05-18 RX ADMIN — SENNOSIDES AND DOCUSATE SODIUM 2 TABLET: 50; 8.6 TABLET ORAL at 08:17

## 2022-05-18 RX ADMIN — HYDROMORPHONE HYDROCHLORIDE 4 MG: 2 TABLET ORAL at 20:34

## 2022-05-18 RX ADMIN — HYDROMORPHONE HYDROCHLORIDE 4 MG: 2 TABLET ORAL at 16:35

## 2022-05-18 RX ADMIN — ENOXAPARIN SODIUM 40 MG: 40 INJECTION SUBCUTANEOUS at 13:47

## 2022-05-18 NOTE — PLAN OF CARE
Goal Outcome Evaluation:      Data: VSS, postpartum assessments WNL. She is voiding without difficulty, up ad angela, passing gas, eating and drinking normally. Incision appears to be healing well, lochia WNL, no s/s infection.Bottle feeding infant 15mls-30mls of donor milk and supplementing with formula if needed. Infant tolerates feeds well.  Taking Norco, dilaudid, toradol for pain and using abdominal binder and heat packs.  Reports good pain management.   Action: Education provided on plan of care.   Response: Pt is agreeable with her plan of care. Positive attachment behaviors observed with infant. Yeyo, significant other present and supportive. Will continue with plan of care.

## 2022-05-18 NOTE — PLAN OF CARE
Data: Vital signs within normal limits. Postpartum checks within normal limits - see flow record. Patient eating and drinking normally. Patient able to empty bladder independently and is up ambulating. No apparent signs of infection. Incision healing well, LYNNETTE with liquid glue, lidocaine patch in place along incision. Patient performing self cares and is able to care for infant.  Action: Patient medicated during the shift for  abdominal pain/incisional pain . See MAR. Patient reassessed within 1 hour after each medication and pain was improved - patient stated she was comfortable. Patient education done about postpartum goals. See flow record.  Response: Positive attachment behaviors observed with infant. Support persons  present.   Plan: Anticipate discharge on tomorrow 11 am.    Problem: Plan of Care - These are the overarching goals to be used throughout the patient stay.    Goal: Plan of Care Review/Shift Note  Description: Pain controlled with PO meds.  Outcome: Ongoing, Progressing   Goal Outcome Evaluation:

## 2022-05-18 NOTE — PLAN OF CARE
Goal Outcome Evaluation:    Plan of Care Reviewed With: patient      Data: VSS, postpartum assessments WNL. She is voiding without difficulty, up ad angela, passing gas, eating and drinking normally. Incision appears to be healing well, lochia WNL, no s/s infection. Bottle feeding with donor milk and supplementing with formula independently. Taking Toradol, Norco, and Dilaudid as needed for pain and using abdominal binder. Reports good pain management.   Action: Education provided on plan of care.  Response: Pt is agreeable with her plan of care. Positive attachment behaviors observed with infant. Support person, rissa [place]. Anticipate discharge per care plan.

## 2022-05-18 NOTE — CONSULTS
Consult received for parking pass.  Anjelica is on MA and family has limited financial resources.  Dad is going back and forth to [place] to care for the couple's other three children.  Family hopes to discharge tomorrow.  This writer provided parking pass due to financial need.    Nina CALLEJAS, MSW, [facility]  Maternal Child Health

## 2022-05-18 NOTE — PROGRESS NOTES
[facility]   Postpartum Note    Name:  Anjelica Holloway  MRN: [id number]    S: Patient is doing well. Pain is overall well-controlled, though had slightly increased pain overnight. Tolerating regular diet. Ambulating without dizziness. Spontaneously voiding. Reports flatus. Lochia is normal.  Breastfeeding. Plans interval tubal for postpartum contraception. Continues to be asymptomatic from COVID.    O:   Patient Vitals for the past 24 hrs:   BP Temp Temp src Pulse Resp SpO2   22 1620 117/67 98.7  F (37.1  C) Oral 94 16 --   22 0815 120/69 98.4  F (36.9  C) Oral 88 16 98 %   22 0404 112/60 98.5  F (36.9  C) Oral 74 16 97 %   22 2329 107/57 98.5  F (36.9  C) Oral 83 16 97 %     Gen:  Resting comfortably, NAD  CV:  Regular rate, well perfused  Pulm:  Breathing room air comfortably  Abd:  Soft, appropriately ttp, non-distended. Fundus firm and below umbilicus  Incision: well-approximated, no surrounding redness or erythema with exofinin place  Ext:  Non-tender, trace LE edema b/l    I/O last 3 completed shifts:  In: 200 [P.O.:200]  Out: 1125 [Urine:1125]    Hgb: 8.8     Assessment/Plan:  26 year old  on POD#2 s/p RHTCS via VML.    Routine postpartum care:  Pain: Well-controlled with toradol> ibuprofen, norco, PO dilaudid, lidocaine patch  Hgb: 10.2>  (TXA)> 8.8. Patient is asymptomatic from acute blood loss anemia and vitals are reassuring. Will discharge home with PO iron.  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 Patient agreeable to this.    EDS Type 3  Nonvascular PAD  - Continue PTA sildenafil    Dispo: Discharge on POD#2-3 pending postop goals.    Carlene Chopra MD  Obstetrics & Gynecology,  PGY-2  05/17/2022 8:51 PM

## 2022-05-18 NOTE — PROGRESS NOTES
"    Anesthesia Post-Partum Follow-Up Note After  with Combined Spinal Epidural    Patient: Anjelica Holloway    Patient location: Post-partum floor.    Anesthesia type: Spinal Block , Combined spinal epidural  Subjective:     She does not complain of pruritis at this time. She denies weakness, denies paresthesia, denies difficulties breathing or voiding, denies nausea or vomiting, and denies headache. She is able to ambulate and tolerates regular diet.    Objective:    Respiratory Function (RR / SpO2 / Airway Patency): Satisfactory    Cardiac Function (HR / Rhythm / BP): Satisfactory    Strength and sensation lower extremities: Normal    Site of epidural insertion: No signs of infection or inflammation.     Last Vitals: /49 (BP Location: Right arm)   Pulse 85   Temp 36.8  C (98.2  F) (Oral)   Resp 18   Ht 1.715 m (5' 7.5\")   Wt 74.8 kg (165 lb)   LMP 2021   SpO2 98%   Breastfeeding Unknown   BMI 25.46 kg/m      Assessment and plan:   Anjelica Holloway is a 26 year old female  post-partum #2 s/p  Repeat c-seaction. An epidural catheter was successfully inserted without technical challenges. The patient delivered via  and the epidural catheter was removed immediately thereafter by the L&D RN with the catheter tip in tact per chart review.     At this time, there is no evidence of adverse side effects associated with the insertion or removal of the epidural catheter. If the patient develops new lower extremity paresis or paresthesias; or if there are concerns regarding the insertion site of the catheter, please reach out to the Dept of Anesthesia.    Thank you for including us in the care for this patient.    Leslie Goldberg, MD   Anesthesiology Resident PGY-2        "

## 2022-05-19 VITALS
HEIGHT: 68 IN | DIASTOLIC BLOOD PRESSURE: 76 MMHG | SYSTOLIC BLOOD PRESSURE: 131 MMHG | OXYGEN SATURATION: 98 % | RESPIRATION RATE: 16 BRPM | BODY MASS INDEX: 25.01 KG/M2 | HEART RATE: 80 BPM | WEIGHT: 165 LBS | TEMPERATURE: 97.8 F

## 2022-05-19 LAB — PLATELET # BLD AUTO: 202 10E3/UL (ref 150–450)

## 2022-05-19 PROCEDURE — 250N000013 HC RX MED GY IP 250 OP 250 PS 637: Performed by: STUDENT IN AN ORGANIZED HEALTH CARE EDUCATION/TRAINING PROGRAM

## 2022-05-19 PROCEDURE — 36415 COLL VENOUS BLD VENIPUNCTURE: CPT | Performed by: STUDENT IN AN ORGANIZED HEALTH CARE EDUCATION/TRAINING PROGRAM

## 2022-05-19 PROCEDURE — 85049 AUTOMATED PLATELET COUNT: CPT | Performed by: STUDENT IN AN ORGANIZED HEALTH CARE EDUCATION/TRAINING PROGRAM

## 2022-05-19 PROCEDURE — 250N000013 HC RX MED GY IP 250 OP 250 PS 637: Performed by: OBSTETRICS & GYNECOLOGY

## 2022-05-19 RX ORDER — HYDROMORPHONE HYDROCHLORIDE 2 MG/1
2 TABLET ORAL EVERY 6 HOURS PRN
Qty: 18 TABLET | Refills: 0 | Status: SHIPPED | OUTPATIENT
Start: 2022-05-19 | End: 2022-05-22

## 2022-05-19 RX ORDER — HYDROCODONE BITARTRATE AND ACETAMINOPHEN 5; 325 MG/1; MG/1
1 TABLET ORAL EVERY 6 HOURS PRN
Qty: 18 TABLET | Refills: 0 | Status: SHIPPED | OUTPATIENT
Start: 2022-05-19 | End: 2022-05-22

## 2022-05-19 RX ADMIN — HYDROMORPHONE HYDROCHLORIDE 4 MG: 2 TABLET ORAL at 00:33

## 2022-05-19 RX ADMIN — Medication 5 MG: at 08:12

## 2022-05-19 RX ADMIN — SILDENAFIL 20 MG: 20 TABLET, FILM COATED ORAL at 08:12

## 2022-05-19 RX ADMIN — HYDROMORPHONE HYDROCHLORIDE 4 MG: 2 TABLET ORAL at 08:32

## 2022-05-19 RX ADMIN — HYDROCODONE BITARTRATE AND ACETAMINOPHEN 1 TABLET: 5; 325 TABLET ORAL at 01:42

## 2022-05-19 RX ADMIN — HYDROMORPHONE HYDROCHLORIDE 4 MG: 2 TABLET ORAL at 04:23

## 2022-05-19 RX ADMIN — HYDROCODONE BITARTRATE AND ACETAMINOPHEN 1 TABLET: 5; 325 TABLET ORAL at 05:58

## 2022-05-19 RX ADMIN — IBUPROFEN 800 MG: 800 TABLET, FILM COATED ORAL at 00:33

## 2022-05-19 RX ADMIN — SENNOSIDES AND DOCUSATE SODIUM 2 TABLET: 50; 8.6 TABLET ORAL at 08:12

## 2022-05-19 RX ADMIN — HYDROCODONE BITARTRATE AND ACETAMINOPHEN 2 TABLET: 5; 325 TABLET ORAL at 10:29

## 2022-05-19 RX ADMIN — IBUPROFEN 800 MG: 800 TABLET, FILM COATED ORAL at 05:58

## 2022-05-19 RX ADMIN — LIDOCAINE PATCH 4% 1 PATCH: 40 PATCH TOPICAL at 08:05

## 2022-05-19 NOTE — PLAN OF CARE
Goal Outcome Evaluation:    Plan of Care Reviewed With: patient     Overall Patient Progress: improving  Vss. Pt is up voiding without difficulty. Incision is open to air. Taking Vicodin, Dilaudid and IBU for pain control. Pt rates her pain a 5-6 but is able to be up ad angela independently within room. Pt showered this evening and tolerated that well. Pt does C/O feet feeling more swollen. The top of both feet do have some swelling with the top of the right foot appearing more swollen than the left. Pt is still pumping and dumping her EBM per  recommendation. Pt is bottle feeding donor breast milk and using some formula at times per her request. Medicated per orders, paged the G2 resident Nav to notify her of pts swelling in feet. She was in the OR and said she will call me back when she gets done. Educated pt on frequency and amounts of donor milk/formula to feed infant. Pt is independent with self and infant cares. Continue with postpartum cares and await call back from G2 resident.

## 2022-05-19 NOTE — PLAN OF CARE
Goal Outcome Evaluation:  Patient is stable, moving well in the room with no dizziness. She takes pain meds when available and working well with  her. Discharge education and instructions reviewed with teach back.  Discharge to home with  baby today.

## 2022-05-19 NOTE — PLAN OF CARE
Goal Outcome Evaluation:  VSS and postpartum assessments WDL. Up ad angela with steady gait and independent with cares.  Bonding well with baby. Pumped twice overnight, dumping due to pain medications. Independently feeding baby bottles of donor milk or formula. Pain remains 5-6/10, managed with Ibuprofen, Dilaudid, and Norco.  Yeyo present and supportive. Only needing to turn in birth certificate before discharge this morning. Will continue with postpartum cares and education per plan of care.

## 2022-05-19 NOTE — PROGRESS NOTES
[facility]   Postpartum Note    Name:  Anjelica Holloway  MRN: [id number]    S: Patient is doing well. Pain is well-controlled. Tolerating regular diet. Ambulating without dizziness. Spontaneously voiding. Reports flatus. Lochia is normal.  Breastfeeding. Plans interval tubal for postpartum contraception. Continues to be asymptomatic from COVID.    O:   Patient Vitals for the past 24 hrs:   BP Temp Temp src Pulse Resp   22 0033 111/63 98.1  F (36.7  C) Oral 73 18   22 1635 120/67 97.7  F (36.5  C) Oral 80 18   22 0755 113/49 98.2  F (36.8  C) Oral 85 18     Gen:  Resting comfortably, NAD  CV:  Regular rate, well perfused  Pulm:  Breathing room air comfortably  Abd:  Soft, appropriately ttp, non-distended. Fundus firm and below umbilicus  Incision: well-approximated, no surrounding redness or erythema with exofin place  Ext:  Non-tender, trace LE edema b/l    Hgb: 8.8     Assessment/Plan:  26 year old  on POD#3 s/p RHTCS via VML.    Routine postpartum care:  Pain: Well-controlled with toradol> ibuprofen, norco, PO dilaudid, lidocaine patch  Hgb: 10.2>  (TXA)> 8.8. Patient is asymptomatic from acute blood loss anemia and vitals are reassuring. Will discharge home with PO iron.  Rh: pos  Imm:  Rubella equivocal - s/p MMR vaccine on , s/p COVID vaccine x3  Feed: Breast  BC: Interval tubal, s/p FTP 3/31/2022    COVID positive  - asymptomatic  - Lovenox prophylaxis    EDS Type 3  Nonvascular PAD  - Continue PTA sildenafil    Dispo: Discharge on POD#3 pending postop goals.    Carlene Chopra MD  Obstetrics & Gynecology, PGY-2  2022 2:39 AM     I have seen and examined the patient with the resident. I have reviewed, edited, and agree with the note.     Sushila Messer MD

## 2022-05-19 NOTE — DISCHARGE INSTRUCTIONS
Going Home with Your New Baby  When a Family Member Has COVID-19  Experts don t yet know if a baby can catch COVID-19 before it s born. Babies can catch the virus after birth from someone in their home. While a baby s symptoms are usually mild, everyone will need to take steps to protect your .  If someone at home has (or may have) COVID-19  All family members must follow these safety rules.   Ill family members should stay away from others in the home. Use separate bedrooms and bathrooms. Keep the baby at least 6 feet away from an ill person at all times.  Check for fever daily. (See  When to get help  below.)  Wash your hands often with soap and water. Wash for at least 20 seconds each time. Or, use a hand  with at least 60% alcohol.  Clean  high touch  surfaces often, like doorknobs, counter tops, bathrooms and toilet handles.  Don t touch your face, eyes, nose or mouth with unwashed hands.  Wear a face mask around others and when caring for your baby.  Never share dishes, glasses or other household items. Wash item in a  or in hot, soapy water. Wash and dry all clothing at high heat.  No visitors.  Stay home as much as you can. Talk with your care team about how and when to get any follow-up care. They may offer visits by phone, online or in your home.  Take care of yourself. Rest as much as possible. Call or text others when you feel up to it. Ask for help if you re worried about depression.  Feeding your baby   Babies need to be fed about 8 to 12 times within 24 hours.  If you have COVID-19: We strongly urge you to have a healthy person feed your baby. They should feed either formula or pumped (expressed) breast milk.  If you re pumping milk  Before you start, wash your hands and breasts with warm, soapy water. Put on fresh clothes and a face mask.  After pumping, clean all parts of the pump that touched your milk.  Wipe down these parts well with sanitizing spray or wipes.  Clean  pump as shown in pump instructions.  Wipe the outside of each bottle with warm, soapy water. Or, use sanitizing spray or wipes.  If you decide to nurse your baby   Do this each time you nurse:  Wear a face mask.  Wash your breasts and hands very well.  Change both yourself and your baby into clean clothing often.  In the first few days after leaving the hospital, offer extra pumped milk or formula to your baby. This will help your baby avoid weight loss and jaundice. It may also prevent extra medical visits.  When to get help  For your baby: If your baby has a fever over 100.4  F (38  C), go to the nearest emergency room. Check for fever in their rectum (bottom) or armpit.  For other family members: If COVID-19 symptoms get much worse, call their family clinic for advice, or go to the emergency room.   Before heading to the emergency room, call ahead to let them know you re coming. Tell them about any COVID-19 in your household, so they can prepare for your visit.   How long must we follow these rules?  To keep your baby safe, everyone in your household must follow these rules until:  Anyone with symptoms of COVID-19 gets tested, and test results show that they don t have the virus.  OR  All three statements below are true for all household members.  You ve had no fever--and no medicine that reduces fever--for 3 full days (72 hours). And   Your other symptoms have gotten better. For example, your cough or breathing has improved. And   At least 10 days have passed since your symptoms started.  Resources  Centers for Disease Control (CDC) guidance to protect yourself or someone who is ill with COVID:  www.cdc.gov/coronavirus/2019-nCoV/index.html  CDC guidance for pregnancy and breast feeding:  www.cdc.gov/coronavirus/2019-ncov/need-extra-precautions/pregnancy-breastfeeding.html  World Health Organization (WHO) Q&A on COVID and  pregnancy:  www.who.int/news-room/q-a-detail/b-a-pq-covid-19-pregnancy-childbirth-and-breastfeeding  [facility] information:   www.health.[facility].mn.us (search  coronavirus ).  For informational purposes only. Not to replace the advice of your health care provider. Copyright   2020 [facility]. All rights reserved. Clinically reviewed by Leno Espinoza MD. [employer] [id number].      Postop  Birth Instructions    Activity     Do not lift more than 10 pounds for 6 weeks after surgery.  Ask family and friends for help when you need it.  No driving until you have stopped taking your pain medications (usually two weeks after surgery).  No heavy exercise or activity for 6 weeks.  Don't do anything that will put a strain on your surgery site.  Don't strain when using the toilet.  Your care team may prescribe a stool softener if you have problems with your bowel movements.     To care for your incision:     Keep the incision clean and dry.  Do not soak your incision in water. No swimming or hot tubs until it has fully healed. You may soak in the bathtub if the water level is below your incision.  Do not use peroxide, gel, cream, lotion, or ointment on your incision.  Adjust your clothes to avoid pressure on your surgery site (check the elastic in your underwear for example).     You may see a small amount of clear or pink drainage and this is normal.  Check with your health care provider:     If the drainage increases or has an odor.  If the incision reddens, you have swelling, or develop a rash.  If you have increased pain and the medicine we prescribed doesn't help.  If you have a fever above 100.4 F (38 C) with or without chills when placing thermometer under your tongue.   The area around your incision (surgery wound), will feel numb.  This is normal. The numbness should go away in less than a year.     Keep your hands clean:  Always wash your hands before touching your incision  (surgery wound). This helps reduce your risk of infection. If your hands aren't dirty, you may use an alcohol hand-rub to clean your hands. Keep your nails clean and short.    Call your healthcare provider if you have any of these symptoms:     You soak a sanitary pad with blood within 1 hour, or you see blood clots larger than a golf ball.  Bleeding that lasts more than 6 weeks.  Vaginal discharge that smells bad.  Severe pain, cramping or tenderness in your lower belly area.  A need to urinate more frequently (use the toilet more often), more urgently (use the toilet very quickly), or it burns when you urinate.  Nausea and vomiting.  Redness, swelling or pain around a vein in your leg.  Problems breastfeeding or a red or painful area on your breast.  Chest pain and cough or are gasping for air.  Problems with coping with sadness, anxiety or depression. If you have concerns about hurting yourself or the baby, call your provider immediately.    You have questions or concerns after you return home.

## 2022-05-25 ENCOUNTER — TELEPHONE (OUTPATIENT)
Dept: CONSULT | Facility: CLINIC | Age: 27
End: 2022-05-25
Payer: COMMERCIAL

## 2022-05-25 NOTE — TELEPHONE ENCOUNTER
Attempted to contact patient to offer to schedule new patient Genetics appointment for herself and/or  baby per [employer] message from Dr. Johnson Varela, but no answer and voice mailbox is full. [employer] message sent.     If patient calls back, OK to schedule new patient Genetics appointment with Dr. Razo, Dr. Varela, Dr. Berrios, Dr. Downing, or Dr. Hong, with GC visit 30 minutes prior.

## 2022-09-15 ENCOUNTER — OFFICE VISIT (OUTPATIENT)
Dept: INTERNAL MEDICINE | Facility: CLINIC | Age: 27
End: 2022-09-15
Payer: COMMERCIAL

## 2022-09-15 VITALS
OXYGEN SATURATION: 100 % | HEIGHT: 68 IN | WEIGHT: 137 LBS | RESPIRATION RATE: 18 BRPM | TEMPERATURE: 97.9 F | SYSTOLIC BLOOD PRESSURE: 116 MMHG | BODY MASS INDEX: 20.76 KG/M2 | HEART RATE: 81 BPM | DIASTOLIC BLOOD PRESSURE: 75 MMHG

## 2022-09-15 DIAGNOSIS — Z23 NEED FOR IMMUNIZATION AGAINST INFLUENZA: ICD-10-CM

## 2022-09-15 DIAGNOSIS — I99.9 VASCULAR ABNORMALITY: ICD-10-CM

## 2022-09-15 DIAGNOSIS — I73.9 PAD (PERIPHERAL ARTERY DISEASE) (H): Primary | ICD-10-CM

## 2022-09-15 PROCEDURE — 90686 IIV4 VACC NO PRSV 0.5 ML IM: CPT | Performed by: INTERNAL MEDICINE

## 2022-09-15 PROCEDURE — 90471 IMMUNIZATION ADMIN: CPT | Performed by: INTERNAL MEDICINE

## 2022-09-15 PROCEDURE — 99203 OFFICE O/P NEW LOW 30 MIN: CPT | Mod: 25 | Performed by: INTERNAL MEDICINE

## 2022-09-15 RX ORDER — LEVONORGESTREL AND ETHINYL ESTRADIOL 0.1-0.02MG
1 KIT ORAL DAILY
COMMUNITY
Start: 2022-07-28 | End: 2023-01-09

## 2022-09-15 RX ORDER — SILDENAFIL CITRATE 20 MG/1
20 TABLET ORAL 2 TIMES DAILY
Qty: 180 TABLET | Refills: 3 | Status: SHIPPED | OUTPATIENT
Start: 2022-09-15

## 2022-09-15 RX ORDER — HYDROCODONE BITARTRATE AND ACETAMINOPHEN 5; 325 MG/1; MG/1
TABLET ORAL
COMMUNITY
Start: 2022-08-30 | End: 2022-09-15

## 2022-09-15 RX ORDER — HYDROCODONE BITARTRATE AND ACETAMINOPHEN 5; 325 MG/1; MG/1
1 TABLET ORAL EVERY 6 HOURS PRN
Qty: 120 TABLET | Refills: 0 | Status: SHIPPED | OUTPATIENT
Start: 2022-09-15 | End: 2022-09-22

## 2022-09-15 ASSESSMENT — ENCOUNTER SYMPTOMS
COLOR CHANGE: 0
CARDIOVASCULAR NEGATIVE: 1
CONSTITUTIONAL NEGATIVE: 1
RESPIRATORY NEGATIVE: 1
NEUROLOGICAL NEGATIVE: 1
ARTHRALGIAS: 1
GASTROINTESTINAL NEGATIVE: 1
MYALGIAS: 1

## 2022-09-15 NOTE — PROGRESS NOTES
Assessment & Plan     PAD (peripheral artery disease) (H) and vascular abnormality  At this time, patient has been on a long-term medication regimen that has kept her symptoms related to her vascular issues under good control.  We will continue her sildenafil at 20 mg by mouth twice per day so that we can try to optimize her circulation.  I will also submit a prescription for hydrocodone/acetaminophen 5 mg / 325 mg tablets with instructions take 1 tab by mouth every 6 hours as needed for severe pain.  Patient did receive a prescription for 120 tablets with no additional refills.  She does require slightly more frequent use of her pain medications during the colder months of the year.  Side effects of each medication were reviewed.  Patient does have annual follow-up with her cardiologist for ongoing monitoring of her vascular issues.  - sildenafil (REVATIO) 20 MG tablet; Take 1 tablet (20 mg) by mouth 2 times daily  - HYDROcodone-acetaminophen (NORCO) 5-325 MG tablet; Take 1 tablet by mouth every 6 hours as needed for severe pain    Need for immunization against influenza  Influenza immunization administered today.      Review of external notes as documented elsewhere in note  Prescription drug management  30 minutes spent on the date of the encounter doing chart review, history and exam, documentation and further activities per the note       See Patient Instructions    Return for Routine preventive.    Devaughn Branham MD  [facility] JOSE Dejesus is a 27 year old, presenting for the following health issues:  Establish Care      Patient is a 27-year-old  female who presents to the clinic as a new patient to establish care.  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  Patient was also diagnosed with a vascular abnormality in her bilateral lower extremities at approximately age 11.  Patient was found to have  "narrowing of her arteries and deterioration of her arteries in her feet bilaterally.  She has been followed by cardiology through the [facility] in the [facility].  Patient was placed on sildenafil 20 mg by mouth twice per day in an attempt to improve her circulation in her feet.  This intervention was found to be very successful as it did prevent her from undergoing amputations of her lower extremities at a young age.  Patient is requesting refills of her sildenafil.  She does have issues with pain in her bilateral lower extremities due to her abnormal circulation issues.  She does take Norco 5 mg / 325 mg tablets 4 times per day during the colder months of the year for pain management.  Cold temperatures do make her issues with her feet significantly worse.  This regimen has kept her pain under good control.  Patient is hopeful that she can get refills of both medications today.  She is tolerating each of medication without issue.  She does have annual evaluation of her circulation through her cardiologist office.      History of Present Illness       Reason for visit:  New patient    She eats 2-3 servings of fruits and vegetables daily.She consumes 2 sweetened beverage(s) daily.She exercises with enough effort to increase her heart rate 30 to 60 minutes per day.  She exercises with enough effort to increase her heart rate 7 days per week.   She is taking medications regularly.       Review of Systems   Constitutional: Negative.    HENT: Negative.    Respiratory: Negative.    Cardiovascular: Negative.    Gastrointestinal: Negative.    Genitourinary: Negative.    Musculoskeletal: Positive for arthralgias and myalgias.   Skin: Negative for color change and pallor.   Neurological: Negative.           Objective    Blood pressure 116/75, pulse 81, temperature 97.9  F (36.6  C), resp. rate 18, height 1.715 m (5' 7.5\"), weight 62.1 kg (137 lb), SpO2 100 %, not currently breastfeeding.      Physical Exam  Vitals " reviewed.   Constitutional:       General: She is not in acute distress.     Appearance: She is well-developed.   HENT:      Right Ear: Tympanic membrane and external ear normal.      Left Ear: Tympanic membrane and external ear normal.      Nose: Nose normal.      Mouth/Throat:      Pharynx: No oropharyngeal exudate.   Eyes:      General:         Right eye: No discharge.         Left eye: No discharge.      Conjunctiva/sclera: Conjunctivae normal.      Pupils: Pupils are equal, round, and reactive to light.   Neck:      Thyroid: No thyromegaly.      Trachea: No tracheal deviation.   Cardiovascular:      Rate and Rhythm: Normal rate and regular rhythm.      Pulses:           Dorsalis pedis pulses are 0 on the right side and 0 on the left side.        Posterior tibial pulses are 0 on the right side and 0 on the left side.      Heart sounds: Normal heart sounds, S1 normal and S2 normal. No murmur heard.    No friction rub. No S3 or S4 sounds.   Pulmonary:      Effort: Pulmonary effort is normal. No respiratory distress.      Breath sounds: Normal breath sounds. No wheezing or rales.   Abdominal:      General: Bowel sounds are normal.      Palpations: Abdomen is soft. There is no mass.      Tenderness: There is no abdominal tenderness.   Musculoskeletal:         General: Normal range of motion.      Cervical back: Neck supple.   Feet:      Right foot:      Skin integrity: Skin integrity normal.      Toenail Condition: Right toenails are normal.      Left foot:      Skin integrity: Skin integrity normal.      Toenail Condition: Left toenails are normal.      Comments: Dorsalis pedis and posterior tibialis pulses are extremely difficult to palpate in her feet bilaterally.  Her feet were also noted to be cold to the touch up to just above her ankles bilaterally.  No discoloration or skin breakdown noted at this time.  Lymphadenopathy:      Cervical: No cervical adenopathy.   Skin:     General: Skin is warm and dry.       Findings: No rash.   Neurological:      Mental Status: She is alert and oriented to person, place, and time.      Motor: No abnormal muscle tone.      Deep Tendon Reflexes: Reflexes are normal and symmetric.   Psychiatric:         Thought Content: Thought content normal.         Judgment: Judgment normal.

## 2022-09-19 ENCOUNTER — MYC MEDICAL ADVICE (OUTPATIENT)
Dept: INTERNAL MEDICINE | Facility: CLINIC | Age: 27
End: 2022-09-19

## 2022-09-19 DIAGNOSIS — I73.9 PAD (PERIPHERAL ARTERY DISEASE) (H): ICD-10-CM

## 2022-09-20 RX ORDER — HYDROCODONE BITARTRATE AND ACETAMINOPHEN 5; 325 MG/1; MG/1
1 TABLET ORAL EVERY 6 HOURS PRN
Qty: 120 TABLET | Refills: 0 | Status: CANCELLED | OUTPATIENT
Start: 2022-09-20

## 2022-09-22 RX ORDER — HYDROCODONE BITARTRATE AND ACETAMINOPHEN 5; 325 MG/1; MG/1
1 TABLET ORAL EVERY 6 HOURS PRN
Qty: 120 TABLET | Refills: 0 | Status: SHIPPED | OUTPATIENT
Start: 2022-09-22 | End: 2022-10-18

## 2022-10-11 ENCOUNTER — MYC REFILL (OUTPATIENT)
Dept: INTERNAL MEDICINE | Facility: CLINIC | Age: 27
End: 2022-10-11

## 2022-10-11 DIAGNOSIS — I73.9 PAD (PERIPHERAL ARTERY DISEASE) (H): ICD-10-CM

## 2022-10-11 RX ORDER — HYDROCODONE BITARTRATE AND ACETAMINOPHEN 5; 325 MG/1; MG/1
1 TABLET ORAL EVERY 6 HOURS PRN
Qty: 120 TABLET | Refills: 0 | OUTPATIENT
Start: 2022-10-11

## 2022-10-11 NOTE — TELEPHONE ENCOUNTER
Pending Prescriptions:                       Disp   Refills    HYDROcodone-acetaminophen (NORCO) 5-325 MG*120 ta*0        Sig: Take 1 tablet by mouth every 6 hours as needed for           severe pain  Routing refill request to provider for review/approval because:  Drug not on the [facility] refill protocol   Forwarding to covering provider   Last ov 9/15/2022

## 2022-10-14 ENCOUNTER — MYC MEDICAL ADVICE (OUTPATIENT)
Dept: INTERNAL MEDICINE | Facility: CLINIC | Age: 27
End: 2022-10-14

## 2022-10-14 DIAGNOSIS — I73.9 PAD (PERIPHERAL ARTERY DISEASE) (H): ICD-10-CM

## 2022-10-18 RX ORDER — HYDROCODONE BITARTRATE AND ACETAMINOPHEN 5; 325 MG/1; MG/1
1 TABLET ORAL EVERY 6 HOURS PRN
Qty: 120 TABLET | Refills: 0 | Status: SHIPPED | OUTPATIENT
Start: 2022-10-18 | End: 2022-11-07

## 2022-12-06 ENCOUNTER — MYC REFILL (OUTPATIENT)
Dept: INTERNAL MEDICINE | Facility: CLINIC | Age: 27
End: 2022-12-06

## 2022-12-06 DIAGNOSIS — I73.9 PAD (PERIPHERAL ARTERY DISEASE) (H): ICD-10-CM

## 2022-12-08 RX ORDER — HYDROCODONE BITARTRATE AND ACETAMINOPHEN 5; 325 MG/1; MG/1
1 TABLET ORAL EVERY 6 HOURS PRN
Qty: 120 TABLET | Refills: 0 | Status: SHIPPED | OUTPATIENT
Start: 2022-12-08 | End: 2023-01-09

## 2022-12-08 NOTE — TELEPHONE ENCOUNTER
Pending Prescriptions:                       Disp   Refills    HYDROcodone-acetaminophen (NORCO) 5-325 MG*120 ta*0        Sig: Take 1 tablet by mouth every 6 hours as needed for           severe pain (7-10)    Routing refill request to provider for review/approval because:  Drug not on the [facility] refill protocol

## 2023-01-09 ENCOUNTER — MYC REFILL (OUTPATIENT)
Dept: INTERNAL MEDICINE | Facility: CLINIC | Age: 28
End: 2023-01-09

## 2023-01-09 DIAGNOSIS — Z30.09 GENERAL COUNSELING FOR PRESCRIPTION OF ORAL CONTRACEPTIVES: Primary | ICD-10-CM

## 2023-01-09 DIAGNOSIS — I73.9 PAD (PERIPHERAL ARTERY DISEASE) (H): ICD-10-CM

## 2023-01-11 NOTE — TELEPHONE ENCOUNTER
Pending Prescriptions:                       Disp   Refills    AVIANE 0.1-20 MG-MCG tablet                                Sig: Take 1 tablet by mouth daily    HYDROcodone-acetaminophen (NORCO) 5-325 MG*120 ta*0        Sig: Take 1 tablet by mouth every 6 hours as needed for           severe pain (7-10)      Routing refill request to provider for review/approval because:  Drug not on the [facility] refill protocol   Medication is reported/historical

## 2023-01-12 RX ORDER — HYDROCODONE BITARTRATE AND ACETAMINOPHEN 5; 325 MG/1; MG/1
1 TABLET ORAL EVERY 6 HOURS PRN
Qty: 120 TABLET | Refills: 0 | Status: SHIPPED | OUTPATIENT
Start: 2023-01-12 | End: 2023-02-15

## 2023-01-12 RX ORDER — LEVONORGESTREL AND ETHINYL ESTRADIOL 0.1-0.02MG
1 KIT ORAL DAILY
Qty: 90 TABLET | Refills: 1 | Status: SHIPPED | OUTPATIENT
Start: 2023-01-12 | End: 2023-02-15

## 2023-01-30 ENCOUNTER — MYC MEDICAL ADVICE (OUTPATIENT)
Dept: INTERNAL MEDICINE | Facility: CLINIC | Age: 28
End: 2023-01-30
Payer: COMMERCIAL

## 2023-01-30 DIAGNOSIS — Z30.09 BIRTH CONTROL COUNSELING: Primary | ICD-10-CM

## 2023-02-01 NOTE — TELEPHONE ENCOUNTER
"Please see [employer] message and advise:    \"Hejs Branham I was just going to see if you could switch me to a different/stronger birth control the one I'm currently taking is causing me to bleed through out the month. I had this issue with a birth control after having my son and my previous provider just had to switch the birth control to a stronger dose so I'm assuming that's the same issue with this one. \"    Pharmacy selected. Routing to covering provider- Last name \"A\"  "

## 2023-02-07 ENCOUNTER — MYC REFILL (OUTPATIENT)
Dept: INTERNAL MEDICINE | Facility: CLINIC | Age: 28
End: 2023-02-07
Payer: COMMERCIAL

## 2023-02-07 DIAGNOSIS — I73.9 PAD (PERIPHERAL ARTERY DISEASE) (H): ICD-10-CM

## 2023-02-07 RX ORDER — HYDROCODONE BITARTRATE AND ACETAMINOPHEN 5; 325 MG/1; MG/1
1 TABLET ORAL EVERY 6 HOURS PRN
Qty: 120 TABLET | Refills: 0 | Status: CANCELLED | OUTPATIENT
Start: 2023-02-07

## 2023-02-09 RX ORDER — HYDROCODONE BITARTRATE AND ACETAMINOPHEN 5; 325 MG/1; MG/1
1 TABLET ORAL EVERY 6 HOURS PRN
Qty: 40 TABLET | Refills: 0 | Status: SHIPPED | OUTPATIENT
Start: 2023-02-09 | End: 2023-02-12

## 2023-02-13 NOTE — PROGRESS NOTES
"  Assessment & Plan     Kyphosis (acquired) (postural)  Patient does have notable kyphosis involving her thoracic spine on physical examination.  Given that she is starting report some issues with discomfort in her upper back, we did elect to proceed with x-ray imaging of the thoracic spine as initial step in evaluation of her symptoms.  Images are currently pending.  Patient will be contacted once the images are available for review, and further recommendations will be made at that time.  - XR Thoracic Spine 2 Views; Future    Plantar warts  Patient has appeared to have a plantar wart at the base of her right fifth toe on the plantar surface of her foot.  Given that she does have some issues with circulation, both patient and I were reluctant to proceed with cryotherapy given the possibility of developing an ulceration in the affected area.  After much discussion, patient did wish to proceed with a trial of a more \"gentle approach,\" that would include the use of more patches and the use of a pumice stone for gentle scrubbing of the lesion.  Should patient fail to improve with these interventions, then we may need to reconsider more aggressive interventions.    Anxiety  Patient does appear to be struggling with anxiety at this time.  She has had difficulty tolerating her Paxil prescription in the past.  After much discussion I would like to proceed with a trial of Lexapro 10 mg by mouth per day for management of her ongoing anxiety.  Side effects of SSRI use were reviewed.  We will reassess her response to this medication in approximately 4 to 6 weeks.  Patient is aware that she can contact the clinic sooner should she have any issues with the medication or worsening of her mood.  - escitalopram (LEXAPRO) 10 MG tablet; Take 1 tablet (10 mg) by mouth daily    PAD (peripheral artery disease) (H)  Chronic condition.  Patient will continue her use of Norco 5 mg / 305 mg by mouth every 6 hours on an as-needed basis for " management of her pain.  Patient has been very compliant with her use of her controlled substances.  Side effects of Norco use were discussed.  We will continue to monitor for any changes in her status.  - HYDROcodone-acetaminophen (NORCO) 5-325 MG tablet; Take 1 tablet by mouth every 6 hours as needed for severe pain (7-10)    Ordering of each unique test  Prescription drug management  30 minutes spent on the date of the encounter doing chart review, history and exam, documentation and further activities per the note     See Patient Instructions    Return in about 4 weeks (around 3/15/2023) for Follow up anxiety.    Devaughn Branham MD  [facility] JOSE Dejesus is a 27 year old, presenting for the following health issues:  No chief complaint on file.      Patient is a 27-year-old  female who presents to the clinic with multiple concerns.  Her first concern is in regards to a lesion located on the plantar surface of her right foot.  Patient states that she did have a loose a small firm spot located at the base of her fifth toe on her right foot approximately 1 month prior to presentation.  Patient reports this lesion is quite painful to walk on.  He has not tried any treatments on this lesion, she was concerned as to exactly what the lesion is.  She also reports some ongoing issues with anxiety.  Patient has had longstanding issues with anxiety, and she had previously been on Paxil 20 mg daily for management of her mood.  Patient did state that this medication was helpful for her, but it was discontinued during one of her pregnancies.  Patient states that she did resume taking the medication after the birth of her child, she did have a history of tolerating the medication.  She began to have issues with tachycardia, tremor, and a sense of feeling unwell.  Patient did discontinue the medication, and she has never been on any other medication for management of her  "anxiety.  Patient does feel at times that her anxiety can be overwhelming.  She denies any specific circumstances or situations that tend to exacerbate her anxiety.  She does report a significant mount of worry and sense of impending doom.  Patient is interested in trying an alternative medication for management of her mood.  She is also concerned about her upper back.  Patient has had longstanding issues with \"abnormal curves in my spine.\"  She does state that she was evaluated for scoliosis as a child, but her images were reportedly unremarkable.  Patient is more concerned about her upper back and she does feel that her shoulders do appear to be slumped forward.  Patient does report she is starting to have pain in her upper back, and she does feel discomfort when leaning her head backwards as it does come into contact with the upper portion of her thoracic spine.  Patient is also requesting a refill of her hydrocodone prescription for management of her pain related to peripheral arterial disease.  She has been on longstanding hydrocodone/acetaminophen milligram/325 mg tablets by mouth every 6 hours as needed for pain.  Patient has been very compliant with the use of her medications, and it does keep her discomfort under good control.    History of Present Illness       Reason for visit:  Medical questions    She eats 2-3 servings of fruits and vegetables daily.She consumes 2 sweetened beverage(s) daily.She exercises with enough effort to increase her heart rate 30 to 60 minutes per day.  She exercises with enough effort to increase her heart rate 7 days per week.   She is taking medications regularly.             Review of Systems   Constitutional: Negative.    HENT: Negative.    Respiratory: Negative.    Cardiovascular: Negative.    Gastrointestinal: Negative.    Musculoskeletal: Positive for arthralgias and back pain.   Skin: Negative.         Lesion on right foot.   Psychiatric/Behavioral: Positive for mood " changes. The patient is nervous/anxious.           Objective    not currently breastfeeding.      Physical Exam  Vitals reviewed.   Constitutional:       Appearance: Normal appearance.   HENT:      Head: Normocephalic and atraumatic.      Right Ear: Tympanic membrane, ear canal and external ear normal.      Left Ear: Tympanic membrane, ear canal and external ear normal.      Mouth/Throat:      Mouth: Mucous membranes are moist.      Pharynx: Oropharynx is clear.   Eyes:      Extraocular Movements: Extraocular movements intact.      Conjunctiva/sclera: Conjunctivae normal.      Pupils: Pupils are equal, round, and reactive to light.   Cardiovascular:      Rate and Rhythm: Normal rate and regular rhythm.      Pulses: Normal pulses.      Heart sounds: Normal heart sounds.   Pulmonary:      Effort: Pulmonary effort is normal.      Breath sounds: Normal breath sounds.   Abdominal:      General: Abdomen is flat. Bowel sounds are normal.      Palpations: Abdomen is soft.   Musculoskeletal:      Cervical back: Normal, normal range of motion and neck supple.      Lumbar back: Normal.      Comments: Notable kyphosis involving thoracic spine.   Skin:     Findings: Lesion (Plantar wart located at base of fifth digit on right foot.  This lesion is approximately 1 cm in diameter.) present.   Neurological:      Mental Status: She is alert.   Psychiatric:         Attention and Perception: Attention normal.         Mood and Affect: Mood is anxious.         Speech: Speech normal.         Behavior: Behavior normal.         Thought Content: Thought content normal.        Diagnostic Test Results: X-rays of thoracic spine, 2-3 views, are pending.

## 2023-02-15 ENCOUNTER — ANCILLARY PROCEDURE (OUTPATIENT)
Dept: GENERAL RADIOLOGY | Facility: CLINIC | Age: 28
End: 2023-02-15
Attending: INTERNAL MEDICINE
Payer: COMMERCIAL

## 2023-02-15 ENCOUNTER — OFFICE VISIT (OUTPATIENT)
Dept: INTERNAL MEDICINE | Facility: CLINIC | Age: 28
End: 2023-02-15
Payer: COMMERCIAL

## 2023-02-15 DIAGNOSIS — B07.0 PLANTAR WARTS: ICD-10-CM

## 2023-02-15 DIAGNOSIS — M40.00 KYPHOSIS (ACQUIRED) (POSTURAL): ICD-10-CM

## 2023-02-15 DIAGNOSIS — I73.9 PAD (PERIPHERAL ARTERY DISEASE) (H): ICD-10-CM

## 2023-02-15 DIAGNOSIS — M40.00 KYPHOSIS (ACQUIRED) (POSTURAL): Primary | ICD-10-CM

## 2023-02-15 DIAGNOSIS — F41.9 ANXIETY: ICD-10-CM

## 2023-02-15 PROCEDURE — 99214 OFFICE O/P EST MOD 30 MIN: CPT | Performed by: INTERNAL MEDICINE

## 2023-02-15 PROCEDURE — 72070 X-RAY EXAM THORAC SPINE 2VWS: CPT | Mod: TC | Performed by: RADIOLOGY

## 2023-02-15 RX ORDER — HYDROCODONE BITARTRATE AND ACETAMINOPHEN 5; 325 MG/1; MG/1
1 TABLET ORAL EVERY 6 HOURS PRN
Qty: 120 TABLET | Refills: 0 | Status: SHIPPED | OUTPATIENT
Start: 2023-02-15 | End: 2023-03-13

## 2023-02-15 RX ORDER — ESCITALOPRAM OXALATE 10 MG/1
10 TABLET ORAL DAILY
Qty: 30 TABLET | Refills: 1 | Status: SHIPPED | OUTPATIENT
Start: 2023-02-15 | End: 2023-05-15

## 2023-02-15 ASSESSMENT — ENCOUNTER SYMPTOMS
ARTHRALGIAS: 1
RESPIRATORY NEGATIVE: 1
BACK PAIN: 1
NERVOUS/ANXIOUS: 1
GASTROINTESTINAL NEGATIVE: 1
CARDIOVASCULAR NEGATIVE: 1
CONSTITUTIONAL NEGATIVE: 1

## 2023-03-13 ENCOUNTER — MYC REFILL (OUTPATIENT)
Dept: INTERNAL MEDICINE | Facility: CLINIC | Age: 28
End: 2023-03-13
Payer: COMMERCIAL

## 2023-03-13 DIAGNOSIS — I73.9 PAD (PERIPHERAL ARTERY DISEASE) (H): ICD-10-CM

## 2023-03-16 RX ORDER — HYDROCODONE BITARTRATE AND ACETAMINOPHEN 5; 325 MG/1; MG/1
1 TABLET ORAL EVERY 6 HOURS PRN
Qty: 120 TABLET | Refills: 0 | Status: SHIPPED | OUTPATIENT
Start: 2023-03-16 | End: 2023-04-06

## 2023-04-06 ENCOUNTER — MYC REFILL (OUTPATIENT)
Dept: INTERNAL MEDICINE | Facility: CLINIC | Age: 28
End: 2023-04-06
Payer: COMMERCIAL

## 2023-04-06 DIAGNOSIS — I73.9 PAD (PERIPHERAL ARTERY DISEASE) (H): ICD-10-CM

## 2023-04-10 RX ORDER — HYDROCODONE BITARTRATE AND ACETAMINOPHEN 5; 325 MG/1; MG/1
1 TABLET ORAL EVERY 6 HOURS PRN
Qty: 120 TABLET | Refills: 0 | Status: SHIPPED | OUTPATIENT
Start: 2023-04-10 | End: 2023-05-05

## 2023-04-22 ENCOUNTER — HEALTH MAINTENANCE LETTER (OUTPATIENT)
Age: 28
End: 2023-04-22

## 2023-05-05 ENCOUNTER — MYC REFILL (OUTPATIENT)
Dept: INTERNAL MEDICINE | Facility: CLINIC | Age: 28
End: 2023-05-05
Payer: COMMERCIAL

## 2023-05-05 DIAGNOSIS — I73.9 PAD (PERIPHERAL ARTERY DISEASE) (H): ICD-10-CM

## 2023-05-08 RX ORDER — HYDROCODONE BITARTRATE AND ACETAMINOPHEN 5; 325 MG/1; MG/1
1 TABLET ORAL EVERY 6 HOURS PRN
Qty: 120 TABLET | Refills: 0 | Status: SHIPPED | OUTPATIENT
Start: 2023-05-08 | End: 2023-05-31

## 2023-05-12 DIAGNOSIS — F41.9 ANXIETY: ICD-10-CM

## 2023-05-15 RX ORDER — ESCITALOPRAM OXALATE 10 MG/1
TABLET ORAL
Qty: 30 TABLET | Refills: 3 | Status: SHIPPED | OUTPATIENT
Start: 2023-05-15 | End: 2023-10-12

## 2023-05-31 ENCOUNTER — MYC REFILL (OUTPATIENT)
Dept: INTERNAL MEDICINE | Facility: CLINIC | Age: 28
End: 2023-05-31
Payer: COMMERCIAL

## 2023-05-31 DIAGNOSIS — I73.9 PAD (PERIPHERAL ARTERY DISEASE) (H): ICD-10-CM

## 2023-06-01 RX ORDER — HYDROCODONE BITARTRATE AND ACETAMINOPHEN 5; 325 MG/1; MG/1
1 TABLET ORAL EVERY 6 HOURS PRN
Qty: 120 TABLET | Refills: 0 | Status: SHIPPED | OUTPATIENT
Start: 2023-06-01 | End: 2023-06-28

## 2023-06-28 ENCOUNTER — MYC REFILL (OUTPATIENT)
Dept: INTERNAL MEDICINE | Facility: CLINIC | Age: 28
End: 2023-06-28
Payer: COMMERCIAL

## 2023-06-28 DIAGNOSIS — I73.9 PAD (PERIPHERAL ARTERY DISEASE) (H): ICD-10-CM

## 2023-06-29 RX ORDER — HYDROCODONE BITARTRATE AND ACETAMINOPHEN 5; 325 MG/1; MG/1
1 TABLET ORAL EVERY 6 HOURS PRN
Qty: 120 TABLET | Refills: 0 | Status: SHIPPED | OUTPATIENT
Start: 2023-06-29 | End: 2023-07-25

## 2023-07-05 ENCOUNTER — MYC MEDICAL ADVICE (OUTPATIENT)
Dept: INTERNAL MEDICINE | Facility: CLINIC | Age: 28
End: 2023-07-05
Payer: COMMERCIAL

## 2023-07-05 NOTE — TELEPHONE ENCOUNTER
[facility] - Spine clinic [phone]    Patient informed of referral info via [employer].

## 2023-07-13 DIAGNOSIS — Z30.09 BIRTH CONTROL COUNSELING: ICD-10-CM

## 2023-07-13 RX ORDER — NORETHINDRONE AND ETHINYL ESTRADIOL 1 MG-35MCG
KIT ORAL
Qty: 84 TABLET | Refills: 1 | Status: SHIPPED | OUTPATIENT
Start: 2023-07-13 | End: 2024-01-08

## 2023-07-25 ENCOUNTER — MYC REFILL (OUTPATIENT)
Dept: INTERNAL MEDICINE | Facility: CLINIC | Age: 28
End: 2023-07-25
Payer: COMMERCIAL

## 2023-07-25 DIAGNOSIS — I73.9 PAD (PERIPHERAL ARTERY DISEASE) (H): ICD-10-CM

## 2023-07-26 RX ORDER — HYDROCODONE BITARTRATE AND ACETAMINOPHEN 5; 325 MG/1; MG/1
1 TABLET ORAL EVERY 6 HOURS PRN
Qty: 120 TABLET | Refills: 0 | Status: SHIPPED | OUTPATIENT
Start: 2023-07-26 | End: 2023-08-23

## 2023-08-15 ENCOUNTER — TELEPHONE (OUTPATIENT)
Dept: NEUROSURGERY | Facility: CLINIC | Age: 28
End: 2023-08-15
Payer: COMMERCIAL

## 2023-08-15 NOTE — TELEPHONE ENCOUNTER
Patient mail box full. Left message with spouse to have patient call the clinic.    Patient needs to reschedule 8-24-23 visit with Alix Priest to Dr. Floyd per staff message.

## 2023-08-23 ENCOUNTER — MYC REFILL (OUTPATIENT)
Dept: INTERNAL MEDICINE | Facility: CLINIC | Age: 28
End: 2023-08-23
Payer: COMMERCIAL

## 2023-08-23 DIAGNOSIS — I73.9 PAD (PERIPHERAL ARTERY DISEASE) (H): ICD-10-CM

## 2023-08-25 ENCOUNTER — MYC MEDICAL ADVICE (OUTPATIENT)
Dept: INTERNAL MEDICINE | Facility: CLINIC | Age: 28
End: 2023-08-25
Payer: COMMERCIAL

## 2023-08-25 ENCOUNTER — MYC REFILL (OUTPATIENT)
Dept: INTERNAL MEDICINE | Facility: CLINIC | Age: 28
End: 2023-08-25
Payer: COMMERCIAL

## 2023-08-25 DIAGNOSIS — I73.9 PAD (PERIPHERAL ARTERY DISEASE) (H): ICD-10-CM

## 2023-08-25 RX ORDER — HYDROCODONE BITARTRATE AND ACETAMINOPHEN 5; 325 MG/1; MG/1
1 TABLET ORAL EVERY 6 HOURS PRN
Qty: 120 TABLET | Refills: 0 | Status: CANCELLED | OUTPATIENT
Start: 2023-08-25

## 2023-08-28 ENCOUNTER — MYC REFILL (OUTPATIENT)
Dept: INTERNAL MEDICINE | Facility: CLINIC | Age: 28
End: 2023-08-28
Payer: COMMERCIAL

## 2023-08-28 DIAGNOSIS — I73.9 PAD (PERIPHERAL ARTERY DISEASE) (H): ICD-10-CM

## 2023-08-28 RX ORDER — HYDROCODONE BITARTRATE AND ACETAMINOPHEN 5; 325 MG/1; MG/1
1 TABLET ORAL EVERY 6 HOURS PRN
Qty: 120 TABLET | Refills: 0 | Status: CANCELLED | OUTPATIENT
Start: 2023-08-28

## 2023-08-28 RX ORDER — HYDROCODONE BITARTRATE AND ACETAMINOPHEN 5; 325 MG/1; MG/1
1 TABLET ORAL EVERY 6 HOURS PRN
Qty: 120 TABLET | Refills: 0 | Status: SHIPPED | OUTPATIENT
Start: 2023-08-28 | End: 2023-09-15

## 2023-09-06 ENCOUNTER — TELEPHONE (OUTPATIENT)
Dept: NEUROSURGERY | Facility: CLINIC | Age: 28
End: 2023-09-06
Payer: COMMERCIAL

## 2023-09-06 NOTE — CONFIDENTIAL NOTE
Patient on schedule today to see Dr. Floyd at 11:20 am. Dr. Floyd reviewed chart and patient has not had any work up prior to appointment, no MRIs etc, only T spine XR. Per Dr. Floyd, patient will need to be worked up by Medical Sp[facility] first. This appt will need to be cancelled.     Attempted to reach out to patient, no answer. Unable to leave voice message, mailbox FULL. Sent patient mychart message.     Called patient a second time and was able to speak to her.     Reviewed above. Patient reports she wasn't looking for a surgical consultation and was questioning the appt today as well. Writer apologized for any inconvenience. Patient in full in agreement with seeing Medical spine for work up and care. Provided her with Dr. Polanco's name and number to get scheduled with him from the spine referral in her chart. All this info was sent to her previously in a myc message.     Appt today with Dr. Floyd has been cancelled.

## 2023-09-15 ENCOUNTER — MYC REFILL (OUTPATIENT)
Dept: INTERNAL MEDICINE | Facility: CLINIC | Age: 28
End: 2023-09-15
Payer: COMMERCIAL

## 2023-09-15 DIAGNOSIS — I73.9 PAD (PERIPHERAL ARTERY DISEASE) (H): ICD-10-CM

## 2023-09-15 RX ORDER — HYDROCODONE BITARTRATE AND ACETAMINOPHEN 5; 325 MG/1; MG/1
1 TABLET ORAL EVERY 6 HOURS PRN
Qty: 120 TABLET | Refills: 0 | Status: SHIPPED | OUTPATIENT
Start: 2023-09-15 | End: 2023-10-10

## 2023-10-10 ENCOUNTER — MYC REFILL (OUTPATIENT)
Dept: INTERNAL MEDICINE | Facility: CLINIC | Age: 28
End: 2023-10-10
Payer: COMMERCIAL

## 2023-10-10 DIAGNOSIS — I73.9 PAD (PERIPHERAL ARTERY DISEASE) (H): ICD-10-CM

## 2023-10-11 RX ORDER — HYDROCODONE BITARTRATE AND ACETAMINOPHEN 5; 325 MG/1; MG/1
1 TABLET ORAL EVERY 6 HOURS PRN
Qty: 120 TABLET | Refills: 0 | Status: SHIPPED | OUTPATIENT
Start: 2023-10-11 | End: 2023-11-03

## 2023-10-12 DIAGNOSIS — F41.9 ANXIETY: ICD-10-CM

## 2023-10-12 RX ORDER — ESCITALOPRAM OXALATE 10 MG/1
TABLET ORAL
Qty: 30 TABLET | Refills: 1 | Status: SHIPPED | OUTPATIENT
Start: 2023-10-12 | End: 2023-12-18

## 2023-11-03 ENCOUNTER — MYC REFILL (OUTPATIENT)
Dept: INTERNAL MEDICINE | Facility: CLINIC | Age: 28
End: 2023-11-03
Payer: COMMERCIAL

## 2023-11-03 DIAGNOSIS — I73.9 PAD (PERIPHERAL ARTERY DISEASE) (H): ICD-10-CM

## 2023-11-03 RX ORDER — HYDROCODONE BITARTRATE AND ACETAMINOPHEN 5; 325 MG/1; MG/1
1 TABLET ORAL EVERY 6 HOURS PRN
Qty: 120 TABLET | Refills: 0 | Status: SHIPPED | OUTPATIENT
Start: 2023-11-03 | End: 2023-11-28

## 2023-11-28 ENCOUNTER — MYC REFILL (OUTPATIENT)
Dept: INTERNAL MEDICINE | Facility: CLINIC | Age: 28
End: 2023-11-28
Payer: COMMERCIAL

## 2023-11-28 DIAGNOSIS — I73.9 PAD (PERIPHERAL ARTERY DISEASE) (H): ICD-10-CM

## 2023-11-29 RX ORDER — HYDROCODONE BITARTRATE AND ACETAMINOPHEN 5; 325 MG/1; MG/1
1 TABLET ORAL EVERY 6 HOURS PRN
Qty: 120 TABLET | Refills: 0 | Status: SHIPPED | OUTPATIENT
Start: 2023-11-29 | End: 2023-12-20

## 2023-12-18 DIAGNOSIS — F41.9 ANXIETY: ICD-10-CM

## 2023-12-18 RX ORDER — ESCITALOPRAM OXALATE 10 MG/1
TABLET ORAL
Qty: 30 TABLET | Refills: 0 | Status: SHIPPED | OUTPATIENT
Start: 2023-12-18 | End: 2024-01-18

## 2023-12-20 ENCOUNTER — MYC REFILL (OUTPATIENT)
Dept: INTERNAL MEDICINE | Facility: CLINIC | Age: 28
End: 2023-12-20
Payer: COMMERCIAL

## 2023-12-20 DIAGNOSIS — I73.9 PAD (PERIPHERAL ARTERY DISEASE) (H): ICD-10-CM

## 2023-12-20 RX ORDER — HYDROCODONE BITARTRATE AND ACETAMINOPHEN 5; 325 MG/1; MG/1
1 TABLET ORAL EVERY 6 HOURS PRN
Qty: 120 TABLET | Refills: 0 | Status: SHIPPED | OUTPATIENT
Start: 2023-12-29 | End: 2023-12-26

## 2023-12-26 ENCOUNTER — MYC MEDICAL ADVICE (OUTPATIENT)
Dept: INTERNAL MEDICINE | Facility: CLINIC | Age: 28
End: 2023-12-26
Payer: COMMERCIAL

## 2023-12-26 DIAGNOSIS — I73.9 PAD (PERIPHERAL ARTERY DISEASE) (H): ICD-10-CM

## 2023-12-26 RX ORDER — HYDROCODONE BITARTRATE AND ACETAMINOPHEN 5; 325 MG/1; MG/1
1 TABLET ORAL EVERY 6 HOURS PRN
Qty: 60 TABLET | Refills: 0 | Status: SHIPPED | OUTPATIENT
Start: 2023-12-29 | End: 2023-12-27

## 2023-12-26 NOTE — TELEPHONE ENCOUNTER
She is getting earlier each time and not asking for next one to make up for the earlier refill so should have extra days of medication   I can fill half of her refill and Carrol can decide about her refill patterns tomorrow

## 2023-12-26 NOTE — TELEPHONE ENCOUNTER
Patient clarifies that she enough medications to last until 12/30, but is leaving tonight for 10 days. Requesting refill today before they leave out of state-going to [place] and it will be colder, which makes her pain worse.     Patient states that she was often requesting refills ahead of time to allow provider time to process her refill request.     Nanda Tovar RN  [facility]

## 2023-12-26 NOTE — TELEPHONE ENCOUNTER
Patient called, requesting to pick-up [employer] from the pharmacy today instead of 12/29 since they will be leaving [facility] for an out of state trip.     Please change the start date to today, if appropriate.

## 2023-12-26 NOTE — TELEPHONE ENCOUNTER
She will have to wait til tomorrow for refill when Concetta can approve     Her refills are early each time - getting earlier than 28 days several times - current one not due til 12/30    30 day refill    28 day refill due   9/16 due 10/16  10/14  10/13 due 11/12  11/10  11/7 due 12/6 12/4 12/2 due 1/1 12/30

## 2023-12-26 NOTE — TELEPHONE ENCOUNTER
Called patient and informed her of provider recommendations.     Nanda Tovar RN  [facility]

## 2023-12-27 RX ORDER — HYDROCODONE BITARTRATE AND ACETAMINOPHEN 5; 325 MG/1; MG/1
1 TABLET ORAL EVERY 6 HOURS PRN
Qty: 120 TABLET | Refills: 0 | Status: SHIPPED | OUTPATIENT
Start: 2023-12-27 | End: 2024-01-19

## 2023-12-27 NOTE — TELEPHONE ENCOUNTER
Duplicate - see telephone encounter from today.     Nanda Tovar, RN  [facility]

## 2023-12-27 NOTE — TELEPHONE ENCOUNTER
Patient calls back to state that patient is leaving for vacation yesterday, but was unable to leave because patient can't fill medication because the date is for 12/29/2023 for the date to . Patient would like to be able to  medication today. Patient would like her regular script. Pended.     Thank you,  Marques Franks, Triage RN [facility]  11:30 AM 12/27/2023

## 2024-01-07 DIAGNOSIS — Z30.09 BIRTH CONTROL COUNSELING: ICD-10-CM

## 2024-01-08 ENCOUNTER — MYC REFILL (OUTPATIENT)
Dept: INTERNAL MEDICINE | Facility: CLINIC | Age: 29
End: 2024-01-08
Payer: COMMERCIAL

## 2024-01-08 DIAGNOSIS — Z30.09 BIRTH CONTROL COUNSELING: ICD-10-CM

## 2024-01-08 RX ORDER — NORETHINDRONE AND ETHINYL ESTRADIOL 1 MG-35MCG
1 KIT ORAL DAILY
Qty: 84 TABLET | Refills: 1 | OUTPATIENT
Start: 2024-01-08

## 2024-01-08 RX ORDER — NORETHINDRONE AND ETHINYL ESTRADIOL 1 MG-35MCG
KIT ORAL
Qty: 84 TABLET | Refills: 0 | Status: SHIPPED | OUTPATIENT
Start: 2024-01-08 | End: 2024-02-16

## 2024-01-18 DIAGNOSIS — F41.9 ANXIETY: ICD-10-CM

## 2024-01-18 RX ORDER — ESCITALOPRAM OXALATE 10 MG/1
TABLET ORAL
Qty: 30 TABLET | Refills: 0 | Status: SHIPPED | OUTPATIENT
Start: 2024-01-18 | End: 2024-02-16

## 2024-01-19 ENCOUNTER — MYC REFILL (OUTPATIENT)
Dept: INTERNAL MEDICINE | Facility: CLINIC | Age: 29
End: 2024-01-19
Payer: COMMERCIAL

## 2024-01-19 DIAGNOSIS — I73.9 PAD (PERIPHERAL ARTERY DISEASE) (H): ICD-10-CM

## 2024-01-19 RX ORDER — HYDROCODONE BITARTRATE AND ACETAMINOPHEN 5; 325 MG/1; MG/1
1 TABLET ORAL EVERY 6 HOURS PRN
Qty: 120 TABLET | Refills: 0 | Status: SHIPPED | OUTPATIENT
Start: 2024-01-19 | End: 2024-02-16

## 2024-02-16 ENCOUNTER — MYC REFILL (OUTPATIENT)
Dept: INTERNAL MEDICINE | Facility: CLINIC | Age: 29
End: 2024-02-16
Payer: COMMERCIAL

## 2024-02-16 DIAGNOSIS — F41.9 ANXIETY: ICD-10-CM

## 2024-02-16 DIAGNOSIS — Z30.09 BIRTH CONTROL COUNSELING: ICD-10-CM

## 2024-02-16 DIAGNOSIS — I73.9 PAD (PERIPHERAL ARTERY DISEASE) (H): ICD-10-CM

## 2024-02-19 RX ORDER — NORETHINDRONE AND ETHINYL ESTRADIOL 1 MG-35MCG
1 KIT ORAL DAILY
Qty: 84 TABLET | Refills: 0 | Status: SHIPPED | OUTPATIENT
Start: 2024-02-19 | End: 2024-05-08

## 2024-02-19 RX ORDER — ESCITALOPRAM OXALATE 10 MG/1
10 TABLET ORAL DAILY
Qty: 30 TABLET | Refills: 0 | Status: SHIPPED | OUTPATIENT
Start: 2024-02-19 | End: 2024-03-14

## 2024-02-19 RX ORDER — HYDROCODONE BITARTRATE AND ACETAMINOPHEN 5; 325 MG/1; MG/1
1 TABLET ORAL EVERY 6 HOURS PRN
Qty: 120 TABLET | Refills: 0 | Status: SHIPPED | OUTPATIENT
Start: 2024-02-19 | End: 2024-03-14

## 2024-03-14 ENCOUNTER — MYC MEDICAL ADVICE (OUTPATIENT)
Dept: INTERNAL MEDICINE | Facility: CLINIC | Age: 29
End: 2024-03-14
Payer: COMMERCIAL

## 2024-03-14 DIAGNOSIS — I73.9 PAD (PERIPHERAL ARTERY DISEASE) (H): ICD-10-CM

## 2024-03-14 DIAGNOSIS — F41.9 ANXIETY: ICD-10-CM

## 2024-03-14 RX ORDER — ESCITALOPRAM OXALATE 10 MG/1
10 TABLET ORAL DAILY
Qty: 30 TABLET | Refills: 1 | Status: SHIPPED | OUTPATIENT
Start: 2024-03-14 | End: 2024-04-22

## 2024-03-14 RX ORDER — HYDROCODONE BITARTRATE AND ACETAMINOPHEN 5; 325 MG/1; MG/1
1 TABLET ORAL EVERY 6 HOURS PRN
Qty: 120 TABLET | Refills: 0 | Status: SHIPPED | OUTPATIENT
Start: 2024-03-14 | End: 2024-04-08

## 2024-03-14 RX ORDER — HYDROCODONE BITARTRATE AND ACETAMINOPHEN 5; 325 MG/1; MG/1
1 TABLET ORAL EVERY 6 HOURS PRN
Qty: 120 TABLET | Refills: 0 | Status: CANCELLED | OUTPATIENT
Start: 2024-03-14

## 2024-03-14 RX ORDER — HYDROCODONE BITARTRATE AND ACETAMINOPHEN 5; 325 MG/1; MG/1
1 TABLET ORAL EVERY 6 HOURS PRN
Qty: 120 TABLET | Refills: 0 | Status: SHIPPED | OUTPATIENT
Start: 2024-03-14 | End: 2024-03-14

## 2024-03-14 NOTE — TELEPHONE ENCOUNTER
Please see patient's mychart messages below.    Lexapro and Norco refill requests denied 3/13/24 due to over due for an appointment (last office visit 2/15/23).    Future appointment scheduled 5/9/24 for annual exam.  Please authorize refill if appropriate.  In her mychart message below, leaving for vacation tomorrow but will run out of medication while gone.    Please advise, thanks.

## 2024-04-08 ENCOUNTER — MYC REFILL (OUTPATIENT)
Dept: INTERNAL MEDICINE | Facility: CLINIC | Age: 29
End: 2024-04-08
Payer: COMMERCIAL

## 2024-04-08 DIAGNOSIS — I73.9 PAD (PERIPHERAL ARTERY DISEASE) (H): ICD-10-CM

## 2024-04-10 RX ORDER — HYDROCODONE BITARTRATE AND ACETAMINOPHEN 5; 325 MG/1; MG/1
1 TABLET ORAL EVERY 6 HOURS PRN
Qty: 120 TABLET | Refills: 0 | Status: SHIPPED | OUTPATIENT
Start: 2024-04-12 | End: 2024-05-08

## 2024-04-22 ENCOUNTER — MYC REFILL (OUTPATIENT)
Dept: INTERNAL MEDICINE | Facility: CLINIC | Age: 29
End: 2024-04-22
Payer: COMMERCIAL

## 2024-04-22 DIAGNOSIS — F41.9 ANXIETY: ICD-10-CM

## 2024-04-22 RX ORDER — ESCITALOPRAM OXALATE 10 MG/1
10 TABLET ORAL DAILY
Qty: 30 TABLET | Refills: 0 | Status: SHIPPED | OUTPATIENT
Start: 2024-04-22 | End: 2024-05-08

## 2024-05-08 ENCOUNTER — MYC REFILL (OUTPATIENT)
Dept: INTERNAL MEDICINE | Facility: CLINIC | Age: 29
End: 2024-05-08
Payer: COMMERCIAL

## 2024-05-08 DIAGNOSIS — F41.9 ANXIETY: ICD-10-CM

## 2024-05-08 DIAGNOSIS — Z30.09 BIRTH CONTROL COUNSELING: ICD-10-CM

## 2024-05-08 DIAGNOSIS — I73.9 PAD (PERIPHERAL ARTERY DISEASE) (H): ICD-10-CM

## 2024-05-08 RX ORDER — HYDROCODONE BITARTRATE AND ACETAMINOPHEN 5; 325 MG/1; MG/1
1 TABLET ORAL EVERY 6 HOURS PRN
Qty: 120 TABLET | Refills: 0 | Status: SHIPPED | OUTPATIENT
Start: 2024-05-08 | End: 2024-06-03

## 2024-05-08 RX ORDER — NORETHINDRONE AND ETHINYL ESTRADIOL 1 MG-35MCG
1 KIT ORAL DAILY
Qty: 84 TABLET | Refills: 0 | Status: SHIPPED | OUTPATIENT
Start: 2024-05-08 | End: 2024-08-22

## 2024-05-08 RX ORDER — ESCITALOPRAM OXALATE 10 MG/1
10 TABLET ORAL DAILY
Qty: 30 TABLET | Refills: 0 | Status: SHIPPED | OUTPATIENT
Start: 2024-05-08 | End: 2024-06-03

## 2024-05-13 ENCOUNTER — PATIENT OUTREACH (OUTPATIENT)
Dept: INTERNAL MEDICINE | Facility: CLINIC | Age: 29
End: 2024-05-13
Payer: COMMERCIAL

## 2024-05-13 NOTE — TELEPHONE ENCOUNTER
Patient Quality Outreach    Patient is due for the following:   Cervical Cancer Screening - PAP Needed  Physical Preventive Adult Physical      Topic Date Due    Hepatitis B Vaccine (1 of 3 - 19+ 3-dose series) Never done    COVID-19 Vaccine (4 - 2023-24 season) 09/01/2023     Chronic Opioid Use -  Treatment Agreement (CSA), Urine Drug Screen, JORGE LUIS-7, and PHQ-9    Next Steps:   Patient has upcoming appointment, these items will be addressed at that time.    Type of outreach:    Chart review performed, no outreach needed.      Questions for provider review:    None           Amanda Valles MA

## 2024-06-03 ENCOUNTER — MYC REFILL (OUTPATIENT)
Dept: INTERNAL MEDICINE | Facility: CLINIC | Age: 29
End: 2024-06-03
Payer: COMMERCIAL

## 2024-06-03 DIAGNOSIS — I73.9 PAD (PERIPHERAL ARTERY DISEASE) (H): ICD-10-CM

## 2024-06-03 DIAGNOSIS — F41.9 ANXIETY: ICD-10-CM

## 2024-06-04 RX ORDER — ESCITALOPRAM OXALATE 10 MG/1
10 TABLET ORAL DAILY
Qty: 30 TABLET | Refills: 0 | Status: SHIPPED | OUTPATIENT
Start: 2024-06-04 | End: 2024-07-19

## 2024-06-05 RX ORDER — HYDROCODONE BITARTRATE AND ACETAMINOPHEN 5; 325 MG/1; MG/1
1 TABLET ORAL EVERY 6 HOURS PRN
Qty: 120 TABLET | Refills: 0 | Status: SHIPPED | OUTPATIENT
Start: 2024-06-07 | End: 2024-06-27

## 2024-06-27 ENCOUNTER — MYC REFILL (OUTPATIENT)
Dept: INTERNAL MEDICINE | Facility: CLINIC | Age: 29
End: 2024-06-27
Payer: COMMERCIAL

## 2024-06-27 ENCOUNTER — MYC MEDICAL ADVICE (OUTPATIENT)
Dept: INTERNAL MEDICINE | Facility: CLINIC | Age: 29
End: 2024-06-27
Payer: COMMERCIAL

## 2024-06-27 DIAGNOSIS — I73.9 PAD (PERIPHERAL ARTERY DISEASE) (H): ICD-10-CM

## 2024-06-27 RX ORDER — HYDROCODONE BITARTRATE AND ACETAMINOPHEN 5; 325 MG/1; MG/1
1 TABLET ORAL EVERY 6 HOURS PRN
Qty: 120 TABLET | Refills: 0 | Status: SHIPPED | OUTPATIENT
Start: 2024-06-30 | End: 2024-06-27

## 2024-06-27 RX ORDER — HYDROCODONE BITARTRATE AND ACETAMINOPHEN 5; 325 MG/1; MG/1
1 TABLET ORAL EVERY 6 HOURS PRN
Qty: 120 TABLET | Refills: 0 | Status: SHIPPED | OUTPATIENT
Start: 2024-06-29 | End: 2024-07-23

## 2024-06-27 NOTE — TELEPHONE ENCOUNTER
Patient has prescription for future  date of 06/30/2024 for [place]. This is currently pending at [address] - [address] [place] [address].     Patient asking for early refill as she will be leaving the morning of 06/30/2024.    Thank you,  Marques Franks, Triage RN [facility]  2:28 PM 6/27/2024

## 2024-06-27 NOTE — TELEPHONE ENCOUNTER
Sent [employer] message to patient.    Thank you,  Marques Franks, Triage RN [facility]  3:02 PM 6/27/2024

## 2024-06-27 NOTE — TELEPHONE ENCOUNTER
Duplicate message. Patient sent message earlier with response. Primary care provider already submitted new prescription.    Thank you,  Marques Franks, Triage RN [facility]  3:05 PM 6/27/2024

## 2024-06-29 ENCOUNTER — HEALTH MAINTENANCE LETTER (OUTPATIENT)
Age: 29
End: 2024-06-29

## 2024-07-19 ENCOUNTER — MYC REFILL (OUTPATIENT)
Dept: INTERNAL MEDICINE | Facility: CLINIC | Age: 29
End: 2024-07-19
Payer: COMMERCIAL

## 2024-07-19 DIAGNOSIS — F41.9 ANXIETY: ICD-10-CM

## 2024-07-19 RX ORDER — ESCITALOPRAM OXALATE 10 MG/1
10 TABLET ORAL DAILY
Qty: 30 TABLET | Refills: 1 | Status: SHIPPED | OUTPATIENT
Start: 2024-07-19 | End: 2024-09-27

## 2024-07-22 ENCOUNTER — MYC MEDICAL ADVICE (OUTPATIENT)
Dept: INTERNAL MEDICINE | Facility: CLINIC | Age: 29
End: 2024-07-22
Payer: COMMERCIAL

## 2024-07-22 DIAGNOSIS — I73.9 PAD (PERIPHERAL ARTERY DISEASE) (H): ICD-10-CM

## 2024-07-23 NOTE — TELEPHONE ENCOUNTER
Refill was denied due to too early to fill. Can prescription be sent with a start date on it? Or hold for fill?

## 2024-07-24 RX ORDER — HYDROCODONE BITARTRATE AND ACETAMINOPHEN 5; 325 MG/1; MG/1
1 TABLET ORAL EVERY 6 HOURS PRN
Qty: 120 TABLET | Refills: 0 | Status: SHIPPED | OUTPATIENT
Start: 2024-07-24 | End: 2024-08-20

## 2024-08-20 ENCOUNTER — MYC REFILL (OUTPATIENT)
Dept: INTERNAL MEDICINE | Facility: CLINIC | Age: 29
End: 2024-08-20
Payer: COMMERCIAL

## 2024-08-20 DIAGNOSIS — I73.9 PAD (PERIPHERAL ARTERY DISEASE) (H): ICD-10-CM

## 2024-08-21 RX ORDER — HYDROCODONE BITARTRATE AND ACETAMINOPHEN 5; 325 MG/1; MG/1
1 TABLET ORAL EVERY 6 HOURS PRN
Qty: 120 TABLET | Refills: 0 | Status: SHIPPED | OUTPATIENT
Start: 2024-08-22 | End: 2024-09-18

## 2024-08-22 ENCOUNTER — MYC REFILL (OUTPATIENT)
Dept: INTERNAL MEDICINE | Facility: CLINIC | Age: 29
End: 2024-08-22
Payer: COMMERCIAL

## 2024-08-22 DIAGNOSIS — Z30.09 BIRTH CONTROL COUNSELING: ICD-10-CM

## 2024-08-22 RX ORDER — NORETHINDRONE AND ETHINYL ESTRADIOL 1 MG-35MCG
1 KIT ORAL DAILY
Qty: 84 TABLET | Refills: 0 | Status: SHIPPED | OUTPATIENT
Start: 2024-08-22

## 2024-08-26 RX ORDER — NORETHINDRONE AND ETHINYL ESTRADIOL 1 MG-35MCG
1 KIT ORAL DAILY
Qty: 84 TABLET | Refills: 0 | OUTPATIENT
Start: 2024-08-26

## 2024-09-16 ENCOUNTER — MYC REFILL (OUTPATIENT)
Dept: INTERNAL MEDICINE | Facility: CLINIC | Age: 29
End: 2024-09-16
Payer: COMMERCIAL

## 2024-09-16 DIAGNOSIS — I73.9 PAD (PERIPHERAL ARTERY DISEASE) (H): ICD-10-CM

## 2024-09-16 RX ORDER — HYDROCODONE BITARTRATE AND ACETAMINOPHEN 5; 325 MG/1; MG/1
1 TABLET ORAL EVERY 6 HOURS PRN
Qty: 120 TABLET | Refills: 0 | OUTPATIENT
Start: 2024-09-16

## 2024-09-18 ENCOUNTER — MYC MEDICAL ADVICE (OUTPATIENT)
Dept: INTERNAL MEDICINE | Facility: CLINIC | Age: 29
End: 2024-09-18
Payer: COMMERCIAL

## 2024-09-18 DIAGNOSIS — I73.9 PAD (PERIPHERAL ARTERY DISEASE) (H): ICD-10-CM

## 2024-09-18 RX ORDER — HYDROCODONE BITARTRATE AND ACETAMINOPHEN 5; 325 MG/1; MG/1
1 TABLET ORAL EVERY 6 HOURS PRN
Qty: 120 TABLET | Refills: 0 | Status: SHIPPED | OUTPATIENT
Start: 2024-09-20

## 2024-09-18 NOTE — TELEPHONE ENCOUNTER
Patient requesting refill for hydrocodone due on 9/20.     Nanda Tovar RN  [facility]

## 2024-09-27 ENCOUNTER — MYC REFILL (OUTPATIENT)
Dept: INTERNAL MEDICINE | Facility: CLINIC | Age: 29
End: 2024-09-27
Payer: COMMERCIAL

## 2024-09-27 DIAGNOSIS — F41.9 ANXIETY: ICD-10-CM

## 2024-09-27 RX ORDER — ESCITALOPRAM OXALATE 10 MG/1
10 TABLET ORAL DAILY
Qty: 30 TABLET | Refills: 1 | Status: SHIPPED | OUTPATIENT
Start: 2024-09-27

## 2024-10-10 ENCOUNTER — TELEPHONE (OUTPATIENT)
Dept: INTERNAL MEDICINE | Facility: CLINIC | Age: 29
End: 2024-10-10

## 2024-10-10 ENCOUNTER — VIRTUAL VISIT (OUTPATIENT)
Dept: INTERNAL MEDICINE | Facility: CLINIC | Age: 29
End: 2024-10-10
Payer: COMMERCIAL

## 2024-10-10 DIAGNOSIS — F41.9 ANXIETY: Primary | ICD-10-CM

## 2024-10-10 DIAGNOSIS — I99.9 VASCULAR ABNORMALITY: ICD-10-CM

## 2024-10-10 DIAGNOSIS — I73.9 PAD (PERIPHERAL ARTERY DISEASE) (H): ICD-10-CM

## 2024-10-10 PROCEDURE — 99443 PR PHYSICIAN TELEPHONE EVALUATION 21-30 MIN: CPT | Mod: 95 | Performed by: INTERNAL MEDICINE

## 2024-10-10 RX ORDER — SILDENAFIL CITRATE 20 MG/1
20 TABLET ORAL 2 TIMES DAILY
Qty: 180 TABLET | Refills: 1 | Status: SHIPPED | OUTPATIENT
Start: 2024-10-10

## 2024-10-10 RX ORDER — OXYCODONE HYDROCHLORIDE 5 MG/1
5 TABLET ORAL DAILY PRN
Qty: 15 TABLET | Refills: 0 | Status: SHIPPED | OUTPATIENT
Start: 2024-10-10 | End: 2024-10-24

## 2024-10-10 RX ORDER — HYDROCODONE BITARTRATE AND ACETAMINOPHEN 5; 325 MG/1; MG/1
1 TABLET ORAL EVERY 6 HOURS PRN
Qty: 120 TABLET | Refills: 0 | Status: SHIPPED | OUTPATIENT
Start: 2024-10-15 | End: 2024-11-11

## 2024-10-10 NOTE — TELEPHONE ENCOUNTER
Patient call to change her appointment today 10/10/24 to virtual for a med refill and she will call back to schedule a annual physical. Patients mention she feeling sick.     Patient would like call back or [email or web address] message for an update.    Okay to change patient appt today to a virtual, please advise?

## 2024-10-10 NOTE — PROGRESS NOTES
Anjelica is a 29 year old who is being evaluated via a billable video visit.    How would you like to obtain your AVS? MyChart  If the video visit is dropped, the invitation should be resent by: Text to cell phone: [phone]  Will anyone else be joining your video visit? No      Assessment & Plan     Anxiety  At this time, patient is reporting that she is experiencing side effects from her escitalopram prescription.  Patient did wish to titrate off of this medication.  We did elect to decrease her Lexapro dosage to 5 mg daily for 2 weeks, and then decrease to 5 mg every other day for 1 week before discontinuing medication.  Patient did express some reluctance about starting any new medications due to issues she has had with both paroxetine and escitalopram.    PAD (peripheral artery disease) (H) and vascular abnormality  Patient is reporting worsening pain related to her history of peripheral arterial disease.  She is finding that she is having times where her pain is not well-controlled with her use of Norco 5 mg / 325 mg tablets.  There are some discussion, we did elect to continue her hydrocodone acetaminophen at 5 mg / 320 mg tablets every 6 hours as needed for pain.  Patient was also provided with a one-time prescription for oxycodone 5 mg with instructions to take 1 tablet/day as needed for severe breakthrough pain that is not controlled with her oral use of Norco.  Follow-up arranged with her vascular specialist for further evaluation of her worsening symptoms.  - sildenafil (REVATIO) 20 MG tablet; Take 1 tablet (20 mg) by mouth 2 times daily.  - oxyCODONE (ROXICODONE) 5 MG tablet; Take 1 tablet (5 mg) by mouth daily as needed for severe pain.  - HYDROcodone-acetaminophen (NORCO) 5-325 MG tablet; Take 1 tablet by mouth every 6 hours as needed for severe pain.            See Patient Instructions    Subjective   Anjelica is a 29 year old, presenting for the following health issues:  No chief complaint on  file.      Video Start Time:  4:00 PM    29-year-old  female who appears patient a virtual visit for follow-up on her medications.  She was initially scheduled for her annual physical, but she did reschedule her visit for a virtual visit due to illness.  Patient is following up on her anxiety medications as well as her pain medication.  She does have a history of anxiety, she has been utilizing escitalopram 10 mg by mouth per day for management of her mood.  Patient is reporting that she has been experiencing some side effects associated with the use of this medication.  She does state that it has affected her libido, and it does not seem to be controlling her anxiety as well as it has in the past.  Patient was previously on paroxetine, and she did have similar issues with that particular medication.  Patient also is on chronic pain medication.  She currently takes Norco 5 mg / 325 mg tablets every 6 hours as needed for pain related to peripheral arterial disease.  This regimen has previously kept her pain under good control, but she is reporting that over the past 1 to 2 months she has had an increase in the severity of her pain.  She does not feel that the current regimen of Norco is keeping her pain under good control.  She is scheduled to be seen by her vascular specialist for further evaluation of worsening of her symptoms.  She is wondering if she would be able to have something on hand for pain that is not controlled by her Norco.             Review of Systems  CONSTITUTIONAL: NEGATIVE for fever, chills, change in weight  INTEGUMENTARY/SKIN: NEGATIVE for worrisome rashes, moles or lesions  RESP: NEGATIVE for significant cough or SOB  CV: NEGATIVE for chest pain, palpitations or peripheral edema  GI: NEGATIVE for nausea, abdominal pain, heartburn, or change in bowel habits  MUSCULOSKELETAL: For lower extremity pain.  NEURO: NEGATIVE for weakness, dizziness or paresthesias  PSYCHIATRIC: Anxiety.       Objective           Vitals:  No vitals were obtained today due to virtual visit.    Physical Exam   GENERAL: alert and no distress  EYES: Eyes grossly normal to inspection.  No discharge or erythema, or obvious scleral/conjunctival abnormalities.  RESP: No audible wheeze, cough, or visible cyanosis.    SKIN: Visible skin clear. No significant rash, abnormal pigmentation or lesions.  NEURO: Cranial nerves grossly intact.  Mentation and speech appropriate for age.  PSYCH: Appropriate affect, tone, and pace of words        Video-Visit Details    Type of service:  Video Visit   Video End Time: 4:25 PM  Originating Location (pt. Location): Home  Distant Location (provider location):  On-site  Platform used for Video Visit: Jerad  Signed Electronically by: Devaughn Branham MD

## 2024-10-11 ENCOUNTER — TELEPHONE (OUTPATIENT)
Dept: INTERNAL MEDICINE | Facility: CLINIC | Age: 29
End: 2024-10-11

## 2024-10-15 ENCOUNTER — MYC MEDICAL ADVICE (OUTPATIENT)
Dept: INTERNAL MEDICINE | Facility: CLINIC | Age: 29
End: 2024-10-15
Payer: COMMERCIAL

## 2024-10-15 NOTE — TELEPHONE ENCOUNTER
PRIOR AUTHORIZATION DENIED    Medication: SILDENAFIL CITRATE 20 MG PO TABS  Insurance Company: [employer] - Phone [phone] Fax [phone]  Denial Date: 10/11/2024  Denial Reason(s):     Appeal Information:     Patient Notified: No

## 2024-10-15 NOTE — TELEPHONE ENCOUNTER
See her [employer]or[employer] message. Last refill was on 9/20/24 for #120.   Dr Branham wrote this new Rx to start today, 10/15/24.     Call to pharmacy. Insurance will not pay for it until tomorrow.   Pt has Medicaid and [facility]. Pharmacy was instructed by Medicaid, to have pt call [employer], her supplemental insurance, to have them do an Override. Pt has to call [employer] herself.   Pharmacy states she instructed pt of this.     The prescription is written correctly, with Start date of today, 10/15/24. it is up to the insurance when they will approve the medication. There is nothing further provider needs to do.     Sent pt a message back with this information.

## 2024-10-22 ENCOUNTER — MYC REFILL (OUTPATIENT)
Dept: INTERNAL MEDICINE | Facility: CLINIC | Age: 29
End: 2024-10-22
Payer: COMMERCIAL

## 2024-10-22 DIAGNOSIS — I73.9 PAD (PERIPHERAL ARTERY DISEASE) (H): ICD-10-CM

## 2024-10-22 DIAGNOSIS — I99.9 VASCULAR ABNORMALITY: ICD-10-CM

## 2024-10-23 RX ORDER — OXYCODONE HYDROCHLORIDE 5 MG/1
5 TABLET ORAL DAILY PRN
Qty: 15 TABLET | Refills: 0 | OUTPATIENT
Start: 2024-10-23

## 2024-10-24 ENCOUNTER — MYC MEDICAL ADVICE (OUTPATIENT)
Dept: INTERNAL MEDICINE | Facility: CLINIC | Age: 29
End: 2024-10-24
Payer: COMMERCIAL

## 2024-10-24 RX ORDER — OXYCODONE HYDROCHLORIDE 5 MG/1
5 TABLET ORAL DAILY PRN
Qty: 15 TABLET | Refills: 0 | Status: SHIPPED | OUTPATIENT
Start: 2024-10-24

## 2024-10-24 NOTE — TELEPHONE ENCOUNTER
Called pharmacy and notified of the provider's advice below.  The pharmacist asked if there's an upcoming appointment: none; last time patient was seen by the provider: 10/10/24. No further questions.     Notified pt via [employer]OVIX.

## 2024-10-24 NOTE — TELEPHONE ENCOUNTER
"Patient requested an oxycodone refill which was refused yesterday due to refill too soon.     She sent the following myc message: \"Hi, I'm not sure in  is out of the office still but I sent in a refill request for the oxycodone that is due to get filled today and the pharmacy still hasn't received anything.    Medication and pharmacy pended. Thank you.   "

## 2024-10-24 NOTE — TELEPHONE ENCOUNTER
Pharmacy calling to confirm this prescription is correct due to the patient already taking Norco.     Spoke with Dr Branham who states the Oxycodone is a short term addition for severe pain. If this does not seem to work on a short term basis we will adjust medications in the future.   Dr Branham wants to leave the prescriptions as is for now.    RN, please call pharmacy to confirm this prescription is accurate.

## 2024-10-24 NOTE — TELEPHONE ENCOUNTER
Called patient and spoke with her. Let her know this medication was confirmed by Dr Branham and one of the nurse's will call the pharmacy shortly to have them fill.

## 2024-11-06 ENCOUNTER — MYC REFILL (OUTPATIENT)
Dept: INTERNAL MEDICINE | Facility: CLINIC | Age: 29
End: 2024-11-06
Payer: COMMERCIAL

## 2024-11-06 DIAGNOSIS — Z30.09 BIRTH CONTROL COUNSELING: ICD-10-CM

## 2024-11-06 RX ORDER — NORETHINDRONE AND ETHINYL ESTRADIOL 1 MG-35MCG
1 KIT ORAL DAILY
Qty: 84 TABLET | Refills: 0 | Status: SHIPPED | OUTPATIENT
Start: 2024-11-06

## 2024-11-11 ENCOUNTER — MYC REFILL (OUTPATIENT)
Dept: INTERNAL MEDICINE | Facility: CLINIC | Age: 29
End: 2024-11-11
Payer: COMMERCIAL

## 2024-11-11 DIAGNOSIS — I73.9 PAD (PERIPHERAL ARTERY DISEASE) (H): ICD-10-CM

## 2024-11-11 RX ORDER — HYDROCODONE BITARTRATE AND ACETAMINOPHEN 5; 325 MG/1; MG/1
1 TABLET ORAL EVERY 6 HOURS PRN
Qty: 120 TABLET | Refills: 0 | Status: SHIPPED | OUTPATIENT
Start: 2024-11-11

## 2024-11-26 ENCOUNTER — MYC MEDICAL ADVICE (OUTPATIENT)
Dept: INTERNAL MEDICINE | Facility: CLINIC | Age: 29
End: 2024-11-26
Payer: COMMERCIAL

## 2024-11-26 DIAGNOSIS — I73.9 PAD (PERIPHERAL ARTERY DISEASE) (H): ICD-10-CM

## 2024-11-26 DIAGNOSIS — I99.9 VASCULAR ABNORMALITY: ICD-10-CM

## 2024-11-27 RX ORDER — OXYCODONE HYDROCHLORIDE 5 MG/1
5 TABLET ORAL DAILY PRN
Qty: 10 TABLET | Refills: 0 | Status: SHIPPED | OUTPATIENT
Start: 2024-11-27

## 2024-12-06 ENCOUNTER — MYC REFILL (OUTPATIENT)
Dept: INTERNAL MEDICINE | Facility: CLINIC | Age: 29
End: 2024-12-06
Payer: COMMERCIAL

## 2024-12-06 DIAGNOSIS — I73.9 PAD (PERIPHERAL ARTERY DISEASE) (H): ICD-10-CM

## 2024-12-09 ENCOUNTER — MYC MEDICAL ADVICE (OUTPATIENT)
Dept: INTERNAL MEDICINE | Facility: CLINIC | Age: 29
End: 2024-12-09
Payer: COMMERCIAL

## 2024-12-09 ENCOUNTER — PATIENT OUTREACH (OUTPATIENT)
Dept: INTERNAL MEDICINE | Facility: CLINIC | Age: 29
End: 2024-12-09
Payer: COMMERCIAL

## 2024-12-09 RX ORDER — HYDROCODONE BITARTRATE AND ACETAMINOPHEN 5; 325 MG/1; MG/1
1 TABLET ORAL EVERY 6 HOURS PRN
Qty: 120 TABLET | Refills: 0 | Status: SHIPPED | OUTPATIENT
Start: 2024-12-09

## 2024-12-09 NOTE — TELEPHONE ENCOUNTER
Rx pending in separate refill encounter. Needs primary care provider approval.    Thank you,  Marques, Triage RN [facility] [place]    11:39 AM 12/9/2024

## 2024-12-09 NOTE — TELEPHONE ENCOUNTER
Patient Quality Outreach    Patient is due for the following:   Cervical Cancer Screening - PAP Needed  Physical Preventive Adult Physical      Topic Date Due    Hepatitis B Vaccine (1 of 3 - 19+ 3-dose series) Never done    Flu Vaccine (1) 09/01/2024    COVID-19 Vaccine (4 - 2024-25 season) 09/01/2024     Chronic Opioid Use -  Treatment Agreement (CSA), Urine Drug Screen, JORGE LUIS-7, and PHQ-9    Action(s) Taken:   Schedule a Adult Preventative    Type of outreach:    Sent [employer] message.    Questions for provider review:    None           Amanda Valles MA  Chart routed to closed.

## 2025-01-21 ENCOUNTER — MYC REFILL (OUTPATIENT)
Dept: INTERNAL MEDICINE | Facility: CLINIC | Age: 30
End: 2025-01-21
Payer: COMMERCIAL

## 2025-01-21 DIAGNOSIS — I73.9 PAD (PERIPHERAL ARTERY DISEASE): ICD-10-CM

## 2025-01-21 DIAGNOSIS — I99.9 VASCULAR ABNORMALITY: ICD-10-CM

## 2025-01-22 RX ORDER — OXYCODONE HYDROCHLORIDE 5 MG/1
5 TABLET ORAL DAILY PRN
Qty: 10 TABLET | Refills: 0 | Status: SHIPPED | OUTPATIENT
Start: 2025-01-22

## 2025-01-30 DIAGNOSIS — I73.9 PAD (PERIPHERAL ARTERY DISEASE): ICD-10-CM

## 2025-01-30 RX ORDER — HYDROCODONE BITARTRATE AND ACETAMINOPHEN 5; 325 MG/1; MG/1
1 TABLET ORAL EVERY 6 HOURS PRN
Qty: 120 TABLET | Refills: 0 | Status: SHIPPED | OUTPATIENT
Start: 2025-02-03 | End: 2025-01-30

## 2025-01-30 RX ORDER — HYDROCODONE BITARTRATE AND ACETAMINOPHEN 5; 325 MG/1; MG/1
1 TABLET ORAL EVERY 6 HOURS PRN
Qty: 120 TABLET | Refills: 0 | Status: SHIPPED | OUTPATIENT
Start: 2025-02-01

## 2025-01-30 NOTE — TELEPHONE ENCOUNTER
See her [employer] message.   Advised pt that she should not send refill request a week ahead of time, this is too early.     (RN accidentally clicked on Triage call button, but this is not triage)

## 2025-02-03 ENCOUNTER — TELEPHONE (OUTPATIENT)
Dept: INTERNAL MEDICINE | Facility: CLINIC | Age: 30
End: 2025-02-03
Payer: COMMERCIAL

## 2025-02-03 NOTE — TELEPHONE ENCOUNTER
"FYI:    Darinel from [facility] in [place] ([phone]) calls to report he is starting to recognize a pattern with patient of wanting to refill her Norco early. Last week patient wanted her Norco early as she stated she was going out of town for weekend.  Upon checking  Darinel notes that patient requested early fills in Sept by 1 week and in Jan. 2025 meaning she has had a 2 week surplus over the past 6 months by filling early. Other pharmacy staff have commented that patient asks for the Norco early every month.  There is a comment in her file from unknown date that states the Norco must last 30 days. For current [place] refill patient spoke to 3 different pharmacists at different times pleading her case.  Darinel reports she \"argued pretty hard\"  that she should be able to  early and was really pushing to have that allowed.  Darinel states that he will continue to keep her as a customer but if behavior continues she will be terminated from their pharmacy.  ANGELA Patel R.N.      "

## 2025-02-04 ENCOUNTER — HOSPITAL ENCOUNTER (OUTPATIENT)
Dept: CT IMAGING | Facility: CLINIC | Age: 30
Discharge: HOME | End: 2025-02-04
Attending: INTERNAL MEDICINE
Payer: COMMERCIAL

## 2025-02-04 ENCOUNTER — HOSPITAL ENCOUNTER (OUTPATIENT)
Dept: ULTRASOUND IMAGING | Facility: CLINIC | Age: 30
Discharge: HOME | End: 2025-02-04
Attending: INTERNAL MEDICINE
Payer: COMMERCIAL

## 2025-02-04 ENCOUNTER — TELEPHONE (OUTPATIENT)
Dept: CARDIOLOGY | Facility: CLINIC | Age: 30
End: 2025-02-04

## 2025-02-04 DIAGNOSIS — Q79.60 EHLERS-DANLOS SYNDROME: ICD-10-CM

## 2025-02-04 DIAGNOSIS — I73.9 PAD (PERIPHERAL ARTERY DISEASE): ICD-10-CM

## 2025-02-04 PROCEDURE — 75635 CT ANGIO ABDOMINAL ARTERIES: CPT

## 2025-02-04 PROCEDURE — 250N000011 HC RX IP 250 OP 636: Performed by: INTERNAL MEDICINE

## 2025-02-04 PROCEDURE — 250N000009 HC RX 250: Performed by: INTERNAL MEDICINE

## 2025-02-04 PROCEDURE — 93922 UPR/L XTREMITY ART 2 LEVELS: CPT

## 2025-02-04 RX ORDER — IOPAMIDOL 755 MG/ML
100 INJECTION, SOLUTION INTRAVASCULAR ONCE
Status: COMPLETED | OUTPATIENT
Start: 2025-02-04 | End: 2025-02-04

## 2025-02-04 RX ADMIN — SODIUM CHLORIDE 80 ML: 9 INJECTION, SOLUTION INTRAVENOUS at 08:45

## 2025-02-04 RX ADMIN — IOPAMIDOL 100 ML: 755 INJECTION, SOLUTION INTRAVENOUS at 08:45

## 2025-02-04 NOTE — TELEPHONE ENCOUNTER
Pt had LON and CTA abdomen pelvis runoff with contrast done today:    IMPRESSION:  1.  RIGHT LOWER EXTREMITY: LON at rest is normal.  2.  LEFT LOWER EXTREMITY: LON at rest is normal.  3.  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    FINDINGS:  AORTA: The abdominal aorta is of normal caliber without aneurysmal dilatation or significant stenosis. The celiac trunk, superior mesenteric artery, inferior mesenteric artery, and renal arteries are patent without significant stenoses.     Iliac arteries: The iliac arteries are patent without significant stenoses.     RIGHT LEG:   Common femoral artery: No significant stenosis.  Profunda femoris artery: No significant stenosis.  Superficial femoral artery: No significant stenosis.  Popliteal artery: No significant stenosis.  Tibial arteries: Three-vessel runoff. The peroneal artery is the largest runoff artery. The distal posterior tibial artery is not well seen most likely due to dilution of contrast and small size.     LEFT LEG:   Common femoral artery: No significant stenosis.  Profunda femoris artery: No significant stenosis.  Superficial femoral artery: No significant stenosis.  Popliteal artery: No significant stenosis.  Tibial arteries: Primarily two-vessel runoff via the peroneal and anterior tibial arteries. The posterior tibial artery is not visualized. Given the lack of significant atherosclerotic disease, this is most likely due to a congenital absence rather than   an occlusion.     LUNG BASES: Unremarkable     ABDOMEN: The patient is status post a cholecystectomy. There is fatty infiltration of the liver. There is a 4 mm nonobstructing stone at the interpolar region of the right kidney. There are a few scattered colonic diverticuli.                                                                      IMPRESSION:  1.  No significant stenoses in the sampled arteries of the abdomen pelvis and lower extremities.  2.  Nonobstructing stone in the right  kidney.  3.  Fatty infiltration of the liver.    Last visit with Dr. 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 ABIs are normal suggesting normal arterial supply to the ankle but ongoing issues with toe supply and this is reflective in her PPGs and TBIs. She is maximized on vasodilator therapy now and doing better but still with purple discoloration but ulcers are healing well.      Summary:     1. Compression stockings: wear in the morning to the evening  2. Vein competency study was negative   3. amlodipine 5 mg once a day  4. Continue sildenafil 20 mg three times a day   5. Follow up in 4 months with Dr. Gutierrez   6. Urology and OMFS referrals    No follow up currently scheduled, will update Dr. Gutierrez of results.

## 2025-02-12 ENCOUNTER — MYC MEDICAL ADVICE (OUTPATIENT)
Dept: INTERNAL MEDICINE | Facility: CLINIC | Age: 30
End: 2025-02-12
Payer: COMMERCIAL

## 2025-02-12 DIAGNOSIS — I73.9 PAD (PERIPHERAL ARTERY DISEASE): ICD-10-CM

## 2025-02-12 DIAGNOSIS — I99.9 VASCULAR ABNORMALITY: ICD-10-CM

## 2025-02-12 RX ORDER — OXYCODONE HYDROCHLORIDE 5 MG/1
5 TABLET ORAL DAILY PRN
Qty: 10 TABLET | Refills: 0 | OUTPATIENT
Start: 2025-02-12

## 2025-02-12 NOTE — TELEPHONE ENCOUNTER
I will not be refilling her oxycodone at this time.  We did receive a phone call from the [facility] pharmacy in [place] on 2/3/25 stating that she has been request refills of her Norco early on a monthly basis.  Review of her records does show that her early refill request has resulted in a 2-week surplus of pills over the past 6 months.  The pharmacist did state that they are considering termination of her from their pharmacy based upon her repeated early requests and behavior when speaking with their pharmacy staff.  Patient does have a 30-day supply of her Norco that was provided on February 1, 2025.  Given these concerns and her inconsistent follow-up for refills of controlled substances in this clinic, I will no longer be able to provide her with controlled substances.

## 2025-02-12 NOTE — TELEPHONE ENCOUNTER
Please see patient's mychart message below.    Asking when she can get a refill on Oxycodone, and provided an update.    Prescription pended.    Please advise, thanks.

## 2025-02-17 NOTE — TELEPHONE ENCOUNTER
Per Dr. Gutierrez:     Lizz Gutierrez MD Hair, Ashley W RN  Caller: Unspecified (1 week ago)  The LON cannot differentiate occlusion vs absence, so it's all in line with CT results. Thanks!    Dr. Gutierrez    Pt updated via [employer].

## 2025-03-03 ENCOUNTER — MYC REFILL (OUTPATIENT)
Dept: INTERNAL MEDICINE | Facility: CLINIC | Age: 30
End: 2025-03-03
Payer: COMMERCIAL

## 2025-03-03 DIAGNOSIS — I73.9 PAD (PERIPHERAL ARTERY DISEASE): ICD-10-CM

## 2025-03-03 RX ORDER — HYDROCODONE BITARTRATE AND ACETAMINOPHEN 5; 325 MG/1; MG/1
1 TABLET ORAL EVERY 6 HOURS PRN
Qty: 120 TABLET | Refills: 0 | Status: SHIPPED | OUTPATIENT
Start: 2025-03-03

## 2025-03-28 ENCOUNTER — MYC MEDICAL ADVICE (OUTPATIENT)
Dept: INTERNAL MEDICINE | Facility: CLINIC | Age: 30
End: 2025-03-28
Payer: COMMERCIAL

## 2025-03-31 ENCOUNTER — MYC MEDICAL ADVICE (OUTPATIENT)
Dept: INTERNAL MEDICINE | Facility: CLINIC | Age: 30
End: 2025-03-31
Payer: COMMERCIAL

## 2025-03-31 DIAGNOSIS — I73.9 PAD (PERIPHERAL ARTERY DISEASE): ICD-10-CM

## 2025-03-31 RX ORDER — HYDROCODONE BITARTRATE AND ACETAMINOPHEN 5; 325 MG/1; MG/1
1 TABLET ORAL EVERY 6 HOURS PRN
Qty: 120 TABLET | Refills: 0 | Status: SHIPPED | OUTPATIENT
Start: 2025-03-31

## 2025-03-31 NOTE — TELEPHONE ENCOUNTER
See [employer] message. Pt is due for this tomorrow at the earliest.     Last refill on 3/3/25 for #120

## 2025-03-31 NOTE — TELEPHONE ENCOUNTER
See her message. Her insurance ends today so is asking for the refill a few days early. Other wise will have to pay OOP $$.

## 2025-04-28 ENCOUNTER — MYC REFILL (OUTPATIENT)
Dept: INTERNAL MEDICINE | Facility: CLINIC | Age: 30
End: 2025-04-28
Payer: COMMERCIAL

## 2025-04-28 DIAGNOSIS — I73.9 PAD (PERIPHERAL ARTERY DISEASE): ICD-10-CM

## 2025-04-28 RX ORDER — HYDROCODONE BITARTRATE AND ACETAMINOPHEN 5; 325 MG/1; MG/1
1 TABLET ORAL EVERY 6 HOURS PRN
Qty: 120 TABLET | Refills: 0 | Status: SHIPPED | OUTPATIENT
Start: 2025-04-28

## 2025-06-02 NOTE — TELEPHONE ENCOUNTER
Please see patient's [employer] message below.    Duplicate request for Norco.     Refilled by Dr. Gutierrez and patient informed via [employer].  
Normal rate, regular rhythm.  Heart sounds S1, S2.  No murmurs, rubs or gallops.

## 2025-06-18 ENCOUNTER — MYC REFILL (OUTPATIENT)
Dept: INTERNAL MEDICINE | Facility: CLINIC | Age: 30
End: 2025-06-18

## 2025-06-18 DIAGNOSIS — I73.9 PAD (PERIPHERAL ARTERY DISEASE): ICD-10-CM

## 2025-06-18 RX ORDER — HYDROCODONE BITARTRATE AND ACETAMINOPHEN 5; 325 MG/1; MG/1
1 TABLET ORAL EVERY 6 HOURS PRN
Qty: 120 TABLET | Refills: 0 | OUTPATIENT
Start: 2025-06-18

## (undated) DEVICE — ESU PENCIL W/SMOKE EVAC NEPTUNE STRYKER 0703-046-000

## (undated) DEVICE — SOL ADH LIQUID BENZOIN SWAB 0.6ML C1544

## (undated) DEVICE — SU VICRYL 0 CT-1 36" J346H

## (undated) DEVICE — BNDG ABDOMINAL BINDER 10X26-50" 08140145

## (undated) DEVICE — SU VICRYL 3-0 CTX 36" UND J980H

## (undated) DEVICE — ESU GROUND PAD UNIVERSAL W/O CORD

## (undated) DEVICE — SU VICRYL 3-0 SH 27" J316H

## (undated) DEVICE — STRAP KNEE/BODY 31143004

## (undated) DEVICE — STOCKING SLEEVE COMPRESSION CALF LG

## (undated) DEVICE — SOL WATER IRRIG 1000ML BOTTLE 07139-09

## (undated) DEVICE — BARRIER SEPRAFILM 5X6" SINGLE SHEET 4301-02

## (undated) DEVICE — SUCTION TIP POOLE K770

## (undated) DEVICE — GLOVE ESTEEM POWDER FREE SMT 6.5  2D72PT65

## (undated) DEVICE — SOL NACL 0.9% IRRIG 1000ML BOTTLE 07138-09

## (undated) DEVICE — LINEN TOWEL PACK X5 5464

## (undated) DEVICE — SU PDS II 0 CTX 60" Z990G

## (undated) DEVICE — GLOVE PROTEXIS BLUE W/NEU-THERA 7.0  2D73EB70

## (undated) DEVICE — SU VICRYL 4-0 PS-2 18" UND J496G

## (undated) DEVICE — DRSG STERI STRIP 1/2X4" R1547

## (undated) DEVICE — TUBING SMOKE EVAC 1CMX3M SEA3710

## (undated) DEVICE — ADH SKIN CLOSURE PREMIERPRO EXOFIN 1.0ML 3470

## (undated) DEVICE — SUCTION CANISTER MEDIVAC LINER 1500ML W/LID 65651-515

## (undated) DEVICE — BASIN SET MAJOR

## (undated) DEVICE — PREP CHLORAPREP 26ML TINTED ORANGE  260815

## (undated) DEVICE — PACK C-SECTION LF PL15OTA83B

## (undated) DEVICE — LINEN GOWN X4 5410

## (undated) DEVICE — ESU PENCIL W/HOLSTER E2350H

## (undated) DEVICE — SU VICRYL 0 CT-1 36" J946H

## (undated) DEVICE — PREP POVIDONE IODINE SOLUTION 10% 120ML

## (undated) DEVICE — DRSG STERI STRIP 1/4X3" R1541

## (undated) DEVICE — SU MONOCRYL 0 CT-1 36" Y346H

## (undated) DEVICE — LINEN ORTHO PACK 5446

## (undated) DEVICE — WIPES FOLEY CARE SURESTEP PROVON DFC100

## (undated) DEVICE — PREP POVIDONE IODINE SCRUB 7.5% 120ML

## (undated) DEVICE — SU MONOCRYL 0 CTB-1 36" YB946

## (undated) DEVICE — GLOVE PROTEXIS W/NEU-THERA 6.5  2D73TE65

## (undated) DEVICE — CATH TRAY FOLEY SURESTEP 16FR WDRAIN BAG STLK LATEX A300316A

## (undated) DEVICE — CATH TRAY FOLEY 16FR BARDEX W/DRAIN BAG STATLOCK 300316A

## (undated) DEVICE — BARRIER INTERCEED 3X4" 4350

## (undated) DEVICE — SOL NACL 0.9% IRRIG 1000ML BOTTLE 2F7124

## (undated) DEVICE — SOL WATER IRRIG 1000ML BOTTLE 2F7114

## (undated) DEVICE — SU MONOCRYL 4-0 PS-2 18" UND Y496G

## (undated) DEVICE — LIGHT HANDLE X2

## (undated) DEVICE — DRAPE SETUP C-SECTION INVISISHIELD 54X90" DYNJE5600

## (undated) RX ORDER — KETAMINE HCL IN 0.9 % NACL 50 MG/5 ML
SYRINGE (ML) INTRAVENOUS
Status: DISPENSED
Start: 2018-07-23

## (undated) RX ORDER — FENTANYL CITRATE 50 UG/ML
INJECTION, SOLUTION INTRAMUSCULAR; INTRAVENOUS
Status: DISPENSED
Start: 2019-07-01

## (undated) RX ORDER — ONDANSETRON 2 MG/ML
INJECTION INTRAMUSCULAR; INTRAVENOUS
Status: DISPENSED
Start: 2018-07-23

## (undated) RX ORDER — OXYTOCIN/0.9 % SODIUM CHLORIDE 30/500 ML
PLASTIC BAG, INJECTION (ML) INTRAVENOUS
Status: DISPENSED
Start: 2019-07-01

## (undated) RX ORDER — EPINEPHRINE 1 MG/ML
INJECTION, SOLUTION, CONCENTRATE INTRAVENOUS
Status: DISPENSED
Start: 2022-05-16

## (undated) RX ORDER — PHENYLEPHRINE HCL IN 0.9% NACL 1 MG/10 ML
SYRINGE (ML) INTRAVENOUS
Status: DISPENSED
Start: 2018-07-23

## (undated) RX ORDER — FENTANYL CITRATE 50 UG/ML
INJECTION, SOLUTION INTRAMUSCULAR; INTRAVENOUS
Status: DISPENSED
Start: 2018-07-23

## (undated) RX ORDER — MORPHINE SULFATE 1 MG/ML
INJECTION, SOLUTION EPIDURAL; INTRATHECAL; INTRAVENOUS
Status: DISPENSED
Start: 2022-05-16

## (undated) RX ORDER — ACETAMINOPHEN 325 MG/1
TABLET ORAL
Status: DISPENSED
Start: 2019-07-01

## (undated) RX ORDER — FENTANYL CITRATE 50 UG/ML
INJECTION, SOLUTION INTRAMUSCULAR; INTRAVENOUS
Status: DISPENSED
Start: 2022-05-16

## (undated) RX ORDER — BUPIVACAINE HYDROCHLORIDE 7.5 MG/ML
INJECTION, SOLUTION INTRASPINAL
Status: DISPENSED
Start: 2022-05-16

## (undated) RX ORDER — HYDROMORPHONE HYDROCHLORIDE 1 MG/ML
INJECTION, SOLUTION INTRAMUSCULAR; INTRAVENOUS; SUBCUTANEOUS
Status: DISPENSED
Start: 2018-07-23

## (undated) RX ORDER — PROPOFOL 10 MG/ML
INJECTION, EMULSION INTRAVENOUS
Status: DISPENSED
Start: 2018-07-23

## (undated) RX ORDER — MORPHINE SULFATE 1 MG/ML
INJECTION, SOLUTION EPIDURAL; INTRATHECAL; INTRAVENOUS
Status: DISPENSED
Start: 2019-07-01